# Patient Record
Sex: MALE | Race: WHITE | NOT HISPANIC OR LATINO | Employment: OTHER | URBAN - METROPOLITAN AREA
[De-identification: names, ages, dates, MRNs, and addresses within clinical notes are randomized per-mention and may not be internally consistent; named-entity substitution may affect disease eponyms.]

---

## 2017-01-11 ENCOUNTER — LAB VISIT (OUTPATIENT)
Dept: LAB | Facility: HOSPITAL | Age: 77
End: 2017-01-11
Payer: MEDICARE

## 2017-01-11 DIAGNOSIS — R73.9 HYPERGLYCEMIA: ICD-10-CM

## 2017-01-11 DIAGNOSIS — I10 ESSENTIAL HYPERTENSION: ICD-10-CM

## 2017-01-11 DIAGNOSIS — Z12.5 SCREENING FOR MALIGNANT NEOPLASM OF PROSTATE: ICD-10-CM

## 2017-01-11 LAB
ALBUMIN SERPL BCP-MCNC: 4 G/DL
ALP SERPL-CCNC: 71 U/L
ALT SERPL W/O P-5'-P-CCNC: 35 U/L
ANION GAP SERPL CALC-SCNC: 9 MMOL/L
AST SERPL-CCNC: 59 U/L
BASOPHILS # BLD AUTO: 0.06 K/UL
BASOPHILS NFR BLD: 0.8 %
BILIRUB SERPL-MCNC: 0.9 MG/DL
BUN SERPL-MCNC: 15 MG/DL
CALCIUM SERPL-MCNC: 9.7 MG/DL
CHLORIDE SERPL-SCNC: 102 MMOL/L
CHOLEST/HDLC SERPL: 2.8 {RATIO}
CO2 SERPL-SCNC: 28 MMOL/L
COMPLEXED PSA SERPL-MCNC: 1.4 NG/ML
CREAT SERPL-MCNC: 1.3 MG/DL
DIFFERENTIAL METHOD: ABNORMAL
EOSINOPHIL # BLD AUTO: 0.4 K/UL
EOSINOPHIL NFR BLD: 6 %
ERYTHROCYTE [DISTWIDTH] IN BLOOD BY AUTOMATED COUNT: 12.7 %
EST. GFR  (AFRICAN AMERICAN): >60 ML/MIN/1.73 M^2
EST. GFR  (NON AFRICAN AMERICAN): 52.3 ML/MIN/1.73 M^2
GLUCOSE SERPL-MCNC: 126 MG/DL
HCT VFR BLD AUTO: 40.8 %
HDL/CHOLESTEROL RATIO: 35.5 %
HDLC SERPL-MCNC: 166 MG/DL
HDLC SERPL-MCNC: 59 MG/DL
HGB BLD-MCNC: 14 G/DL
LDLC SERPL CALC-MCNC: 84.2 MG/DL
LYMPHOCYTES # BLD AUTO: 1.7 K/UL
LYMPHOCYTES NFR BLD: 24 %
MCH RBC QN AUTO: 36.6 PG
MCHC RBC AUTO-ENTMCNC: 34.3 %
MCV RBC AUTO: 107 FL
MONOCYTES # BLD AUTO: 0.8 K/UL
MONOCYTES NFR BLD: 11.4 %
NEUTROPHILS # BLD AUTO: 4.2 K/UL
NEUTROPHILS NFR BLD: 57.7 %
NONHDLC SERPL-MCNC: 107 MG/DL
PLATELET # BLD AUTO: 284 K/UL
PMV BLD AUTO: 10 FL
POTASSIUM SERPL-SCNC: 5.2 MMOL/L
PROT SERPL-MCNC: 7.3 G/DL
RBC # BLD AUTO: 3.83 M/UL
SODIUM SERPL-SCNC: 139 MMOL/L
TRIGL SERPL-MCNC: 114 MG/DL
WBC # BLD AUTO: 7.22 K/UL

## 2017-01-11 PROCEDURE — 36415 COLL VENOUS BLD VENIPUNCTURE: CPT

## 2017-01-11 PROCEDURE — 84153 ASSAY OF PSA TOTAL: CPT

## 2017-01-11 PROCEDURE — 83036 HEMOGLOBIN GLYCOSYLATED A1C: CPT

## 2017-01-11 PROCEDURE — 85025 COMPLETE CBC W/AUTO DIFF WBC: CPT

## 2017-01-11 PROCEDURE — 80053 COMPREHEN METABOLIC PANEL: CPT

## 2017-01-11 PROCEDURE — 80061 LIPID PANEL: CPT

## 2017-01-12 LAB
ESTIMATED AVG GLUCOSE: 114 MG/DL
HBA1C MFR BLD HPLC: 5.6 %

## 2017-01-16 ENCOUNTER — OFFICE VISIT (OUTPATIENT)
Dept: INTERNAL MEDICINE | Facility: CLINIC | Age: 77
End: 2017-01-16
Payer: MEDICARE

## 2017-01-16 VITALS
DIASTOLIC BLOOD PRESSURE: 72 MMHG | HEART RATE: 76 BPM | HEIGHT: 72 IN | SYSTOLIC BLOOD PRESSURE: 112 MMHG | BODY MASS INDEX: 28.31 KG/M2 | WEIGHT: 209 LBS

## 2017-01-16 DIAGNOSIS — M25.562 CHRONIC PAIN OF BOTH KNEES: ICD-10-CM

## 2017-01-16 DIAGNOSIS — I10 ESSENTIAL HYPERTENSION: Primary | ICD-10-CM

## 2017-01-16 DIAGNOSIS — M25.561 CHRONIC PAIN OF BOTH KNEES: ICD-10-CM

## 2017-01-16 DIAGNOSIS — G62.1 NEUROPATHY, ALCOHOLIC: ICD-10-CM

## 2017-01-16 DIAGNOSIS — G89.29 CHRONIC PAIN OF BOTH KNEES: ICD-10-CM

## 2017-01-16 PROCEDURE — 1160F RVW MEDS BY RX/DR IN RCRD: CPT | Mod: S$GLB,,, | Performed by: INTERNAL MEDICINE

## 2017-01-16 PROCEDURE — 3078F DIAST BP <80 MM HG: CPT | Mod: S$GLB,,, | Performed by: INTERNAL MEDICINE

## 2017-01-16 PROCEDURE — 1125F AMNT PAIN NOTED PAIN PRSNT: CPT | Mod: S$GLB,,, | Performed by: INTERNAL MEDICINE

## 2017-01-16 PROCEDURE — 99214 OFFICE O/P EST MOD 30 MIN: CPT | Mod: S$GLB,,, | Performed by: INTERNAL MEDICINE

## 2017-01-16 PROCEDURE — 1157F ADVNC CARE PLAN IN RCRD: CPT | Mod: S$GLB,,, | Performed by: INTERNAL MEDICINE

## 2017-01-16 PROCEDURE — 3074F SYST BP LT 130 MM HG: CPT | Mod: S$GLB,,, | Performed by: INTERNAL MEDICINE

## 2017-01-16 PROCEDURE — 99499 UNLISTED E&M SERVICE: CPT | Mod: S$GLB,,, | Performed by: INTERNAL MEDICINE

## 2017-01-16 PROCEDURE — 99999 PR PBB SHADOW E&M-EST. PATIENT-LVL III: CPT | Mod: PBBFAC,,, | Performed by: INTERNAL MEDICINE

## 2017-01-16 PROCEDURE — 1159F MED LIST DOCD IN RCRD: CPT | Mod: S$GLB,,, | Performed by: INTERNAL MEDICINE

## 2017-01-19 ENCOUNTER — HOSPITAL ENCOUNTER (OUTPATIENT)
Dept: RADIOLOGY | Facility: HOSPITAL | Age: 77
Discharge: HOME | End: 2017-01-19
Attending: ORTHOPAEDIC SURGERY
Payer: MEDICARE

## 2017-01-19 ENCOUNTER — OFFICE VISIT (OUTPATIENT)
Dept: ORTHOPEDICS | Facility: CLINIC | Age: 77
End: 2017-01-19
Payer: MEDICARE

## 2017-01-19 VITALS
SYSTOLIC BLOOD PRESSURE: 133 MMHG | BODY MASS INDEX: 28.49 KG/M2 | DIASTOLIC BLOOD PRESSURE: 74 MMHG | WEIGHT: 207.13 LBS | HEART RATE: 73 BPM

## 2017-01-19 DIAGNOSIS — M25.562 ACUTE PAIN OF BOTH KNEES: ICD-10-CM

## 2017-01-19 DIAGNOSIS — M25.561 ACUTE PAIN OF BOTH KNEES: ICD-10-CM

## 2017-01-19 DIAGNOSIS — M17.0 PRIMARY OSTEOARTHRITIS OF BOTH KNEES: Primary | ICD-10-CM

## 2017-01-19 PROCEDURE — 1157F ADVNC CARE PLAN IN RCRD: CPT | Mod: S$GLB,,, | Performed by: PHYSICIAN ASSISTANT

## 2017-01-19 PROCEDURE — 3075F SYST BP GE 130 - 139MM HG: CPT | Mod: S$GLB,,, | Performed by: PHYSICIAN ASSISTANT

## 2017-01-19 PROCEDURE — 1125F AMNT PAIN NOTED PAIN PRSNT: CPT | Mod: S$GLB,,, | Performed by: PHYSICIAN ASSISTANT

## 2017-01-19 PROCEDURE — 3078F DIAST BP <80 MM HG: CPT | Mod: S$GLB,,, | Performed by: PHYSICIAN ASSISTANT

## 2017-01-19 PROCEDURE — 99999 PR PBB SHADOW E&M-EST. PATIENT-LVL III: CPT | Mod: PBBFAC,,, | Performed by: PHYSICIAN ASSISTANT

## 2017-01-19 PROCEDURE — 1159F MED LIST DOCD IN RCRD: CPT | Mod: S$GLB,,, | Performed by: PHYSICIAN ASSISTANT

## 2017-01-19 PROCEDURE — 1160F RVW MEDS BY RX/DR IN RCRD: CPT | Mod: S$GLB,,, | Performed by: PHYSICIAN ASSISTANT

## 2017-01-19 PROCEDURE — 99203 OFFICE O/P NEW LOW 30 MIN: CPT | Mod: S$GLB,,, | Performed by: PHYSICIAN ASSISTANT

## 2017-01-19 PROCEDURE — 73562 X-RAY EXAM OF KNEE 3: CPT | Mod: 26,50,, | Performed by: RADIOLOGY

## 2017-01-19 PROCEDURE — 73562 X-RAY EXAM OF KNEE 3: CPT | Mod: TC,50

## 2017-01-19 NOTE — LETTER
January 19, 2017      Floyd Moon MD  [address]  [facility] [address]           [facility]  [address]  [facility] [address]  Phone: [phone]          Patient: Ej Miller   MR Number: [id number]   YOB: 1940   Date of Visit: 1/19/2017       Dear Dr. Floyd Moon:    Thank you for referring Ej Miller to me for evaluation. Attached you will find relevant portions of my assessment and plan of care.    If you have questions, please do not hesitate to call me. I look forward to following Ej Miller along with you.    Sincerely,    Isidra Callahan PA-C    Enclosure  CC:  No Recipients    If you would like to receive this communication electronically, please contact [email or web address] or [phone] to request more information on [employer] Link access.    For providers and/or their staff who would like to refer a patient to [facility], please contact us through our one-stop-shop provider referral line, [facility] , at [phone].    If you feel you have received this communication in error or would no longer like to receive these types of communications, please e-mail [email or web address]

## 2017-01-19 NOTE — MR AVS SNAPSHOT
[facility]  [address]  [facility] [address]  Phone: [phone]                  Ej Miller   2017 10:30 AM   Appointment    Description:  Male : 1940   Provider:  Isidra Callahan PA-C   Department:  [facility]                To Do List           Future Appointments        Provider Department Dept Phone    2017 10:30 AM Isidra Callahan PA-C [facility] [phone]      Goals (5 Years of Data)     None      [facility]     [facility] On [facility] Nurse Care Line -  Assistance  Registered nurses in the [facility] Call Center provide clinical advisement, health education, appointment booking, and other advisory services.  Call for this free service at [phone].             Medications           Message regarding Medications     Verify the changes and/or additions to your medication regime listed below are the same as discussed with your clinician today.  If any of these changes or additions are incorrect, please notify your healthcare provider.             Verify that the below list of medications is an accurate representation of the medications you are currently taking.  If none reported, the list may be blank. If incorrect, please contact your healthcare provider. Carry this list with you in case of emergency.           Current Medications     allopurinol (ZYLOPRIM) 100 MG tablet TAKE 2 TABLETS ONE TIME DAILY    amlodipine (NORVASC) 5 MG tablet TAKE 1 TABLET ONE TIME DAILY    colchicine 0.6 mg tablet Take 1 tablet (0.6 mg total) by mouth 2 (two) times daily.    latanoprost (XALATAN) 0.005 % ophthalmic solution Place 1 drop into both eyes once daily.    losartan (COZAAR) 100 MG tablet TAKE 1 TABLET ONE TIME DAILY    metoprolol succinate (TOPROL-XL) 100 MG 24 hr tablet TAKE 1 TABLET ONE TIME DAILY  (NEED  APPOINTMENT)    triamcinolone acetonide 0.1% (KENALOG) 0.1 % cream Apply topically 2 (two) times daily.           Clinical  Reference Information           Allergies as of 1/19/2017     Ketoconazole      Immunizations Administered on Date of Encounter - 1/19/2017     None

## 2017-01-19 NOTE — PROGRESS NOTES
Subjective:      Patient ID: Ej Miller is a 76 y.o. male.    Chief Complaint: No chief complaint on file.    HPI  76 year old male presents with chief complaint of bilateral knee pain R>L x 6 years. He denies trauma. Pain is worse with increased activity. He plays golf and he was unable to finish the game due to knee pain. He takes aleve prn which helps but he has h/o ulcer. He received viscosupplementation several years ago that only helped for a couple of months. He does not use assistive devices. He says the pain is getting worse.   Review of Systems   Constitution: Negative for chills, fever and night sweats.   Cardiovascular: Negative for chest pain.   Respiratory: Negative for cough and shortness of breath.    Hematologic/Lymphatic: Does not bruise/bleed easily.   Skin: Negative for color change.   Gastrointestinal: Negative for heartburn.   Genitourinary: Negative for dysuria.   Neurological: Negative for numbness and paresthesias.   Psychiatric/Behavioral: Negative for altered mental status.   Allergic/Immunologic: Negative for persistent infections.         Objective:            General    Vitals reviewed.  Constitutional: He is oriented to person, place, and time. He appears well-developed and well-nourished.   Cardiovascular: Normal rate.    Neurological: He is alert and oriented to person, place, and time.             Right Knee Exam:  ROM 5-120 degrees  +crepitus  No effusion  TTP medial and lateral joint line    Left Knee Exam:  ROM 0-120 degrees  +crepitus  No effusion  TTP medial and lateral joint line    X-ray: ordered and reviewed by myself. DJD present.       Assessment:       Encounter Diagnosis   Name Primary?    Primary osteoarthritis of both knees Yes          Plan:       Discussed treatment options with patient. He is interested in surgery. Consult scheduled with Dr. Ayala on 2/6/17.

## 2017-02-06 ENCOUNTER — OFFICE VISIT (OUTPATIENT)
Dept: ORTHOPEDICS | Facility: CLINIC | Age: 77
End: 2017-02-06
Payer: MEDICARE

## 2017-02-06 ENCOUNTER — HOSPITAL ENCOUNTER (OUTPATIENT)
Dept: RADIOLOGY | Facility: HOSPITAL | Age: 77
Discharge: HOME | End: 2017-02-06
Attending: ORTHOPAEDIC SURGERY
Payer: MEDICARE

## 2017-02-06 ENCOUNTER — TELEPHONE (OUTPATIENT)
Dept: INTERNAL MEDICINE | Facility: CLINIC | Age: 77
End: 2017-02-06

## 2017-02-06 ENCOUNTER — ANESTHESIA EVENT (OUTPATIENT)
Dept: SURGERY | Facility: HOSPITAL | Age: 77
DRG: 470 | End: 2017-02-06
Payer: MEDICARE

## 2017-02-06 VITALS
WEIGHT: 211 LBS | RESPIRATION RATE: 18 BRPM | BODY MASS INDEX: 28.58 KG/M2 | SYSTOLIC BLOOD PRESSURE: 156 MMHG | HEART RATE: 56 BPM | DIASTOLIC BLOOD PRESSURE: 79 MMHG | HEIGHT: 72 IN

## 2017-02-06 DIAGNOSIS — M25.562 PAIN IN BOTH KNEES, UNSPECIFIED CHRONICITY: Primary | ICD-10-CM

## 2017-02-06 DIAGNOSIS — M25.561 PAIN IN BOTH KNEES, UNSPECIFIED CHRONICITY: ICD-10-CM

## 2017-02-06 DIAGNOSIS — M25.561 PAIN IN BOTH KNEES, UNSPECIFIED CHRONICITY: Primary | ICD-10-CM

## 2017-02-06 DIAGNOSIS — M79.604 PAIN OF RIGHT LOWER EXTREMITY: Primary | ICD-10-CM

## 2017-02-06 DIAGNOSIS — M25.562 PAIN IN BOTH KNEES, UNSPECIFIED CHRONICITY: ICD-10-CM

## 2017-02-06 PROCEDURE — 3078F DIAST BP <80 MM HG: CPT | Mod: S$GLB,,, | Performed by: ORTHOPAEDIC SURGERY

## 2017-02-06 PROCEDURE — 99999 PR PBB SHADOW E&M-EST. PATIENT-LVL V: CPT | Mod: PBBFAC,,, | Performed by: ORTHOPAEDIC SURGERY

## 2017-02-06 PROCEDURE — 73560 X-RAY EXAM OF KNEE 1 OR 2: CPT | Mod: 50,TC

## 2017-02-06 PROCEDURE — 1157F ADVNC CARE PLAN IN RCRD: CPT | Mod: S$GLB,,, | Performed by: ORTHOPAEDIC SURGERY

## 2017-02-06 PROCEDURE — 3077F SYST BP >= 140 MM HG: CPT | Mod: S$GLB,,, | Performed by: ORTHOPAEDIC SURGERY

## 2017-02-06 PROCEDURE — 1159F MED LIST DOCD IN RCRD: CPT | Mod: S$GLB,,, | Performed by: ORTHOPAEDIC SURGERY

## 2017-02-06 PROCEDURE — 73560 X-RAY EXAM OF KNEE 1 OR 2: CPT | Mod: 26,50,, | Performed by: RADIOLOGY

## 2017-02-06 PROCEDURE — 1160F RVW MEDS BY RX/DR IN RCRD: CPT | Mod: S$GLB,,, | Performed by: ORTHOPAEDIC SURGERY

## 2017-02-06 PROCEDURE — 99213 OFFICE O/P EST LOW 20 MIN: CPT | Mod: S$GLB,,, | Performed by: ORTHOPAEDIC SURGERY

## 2017-02-06 PROCEDURE — 1125F AMNT PAIN NOTED PAIN PRSNT: CPT | Mod: S$GLB,,, | Performed by: ORTHOPAEDIC SURGERY

## 2017-02-06 NOTE — LETTER
February 6, 2017      Isidra Callahan PA-C  [address] [facility] [address]           [facility]  [address]  [place] [address]  Phone: [phone]          Patient: Ej Miller   MR Number: [id number]   YOB: 1940   Date of Visit: 2/6/2017       Dear Isidra Callahan:    Thank you for referring Ej Miller to me for evaluation. Attached you will find relevant portions of my assessment and plan of care.    If you have questions, please do not hesitate to call me. I look forward to following Ej Miller along with you.    Sincerely,    Wagner Ayala MD    Enclosure  CC:  No Recipients    If you would like to receive this communication electronically, please contact [email or web address] or [phone] to request more information on [employer] Link access.    For providers and/or their staff who would like to refer a patient to [facility], please contact us through our one-stop-shop provider referral line, [facility], at [phone].    If you feel you have received this communication in error or would no longer like to receive these types of communications, please e-mail [email or web address]

## 2017-02-06 NOTE — PROGRESS NOTES
CC:   right knee pain  HPI:  76 y.o. yo male who presents with right knee pain.  This is a very pleasant 76-year-old male avid golfer who presents with right knee pain.  He states the right knee is been hurting for many months and even years.  He states that the pain is sharp and stabbing mostly on the medial aspect of the knee.  He also notes pain at the lateral aspect of the patella.  He states that the pain is there most days.  He keeps him from his daily activities and if he doesn't take Aleve then the pain keeps him from playing golf.  It affects him going up and downstairs.  Wakes him from sleep at night.  It is 8 out of 10 at its worst.  He is tried Visco supplementation anti-inflammatories and physical therapy.  Given that he is failed all of this he is being sent to me for total knee replacement.      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     Gout attack     Gout, joint     HTN (hypertension)     Hyperglycemia     Osteoarthritis of knee      bilaterial    Peptic ulcer      PAST SURGICAL HISTORY:   Past Surgical History   Procedure Laterality Date    Appendectomy      Tonsillectomy, adenoidectomy       FAMILY HISTORY:   Family History   Problem Relation Age of Onset    Stroke Father     No Known Problems Mother     No Known Problems Sister     No Known Problems Brother     No Known Problems Maternal Aunt     No Known Problems Maternal Uncle     No Known Problems Paternal Aunt     No Known Problems Paternal Uncle     No Known Problems Maternal Grandmother     No Known Problems Maternal Grandfather     No Known Problems Paternal Grandmother     No Known Problems Paternal Grandfather     Acne Neg Hx     Eczema Neg Hx     Lupus Neg Hx     Psoriasis Neg Hx     Melanoma Neg Hx     Amblyopia Neg Hx     Blindness Neg Hx     Cancer Neg Hx     Cataracts Neg Hx     Diabetes Neg  "Hx     Glaucoma Neg Hx     Hypertension Neg Hx     Macular degeneration Neg Hx     Retinal detachment Neg Hx     Strabismus Neg Hx     Thyroid disease Neg Hx      SOCIAL HISTORY:   Social History     Social History    Marital status:      Spouse name: N/A    Number of children: N/A    Years of education: N/A     Occupational History    retired      Social History Main Topics    Smoking status: Never Smoker    Smokeless tobacco: Never Used    Alcohol use Yes      Comment: moderate    Drug use: No    Sexual activity: Not on file     Other Topics Concern    Not on file     Social History Narrative       MEDICATIONS:   Current Outpatient Prescriptions:     allopurinol (ZYLOPRIM) 100 MG tablet, TAKE 2 TABLETS ONE TIME DAILY, Disp: 180 tablet, Rfl: 0    amlodipine (NORVASC) 5 MG tablet, TAKE 1 TABLET ONE TIME DAILY, Disp: 90 tablet, Rfl: 3    latanoprost (XALATAN) 0.005 % ophthalmic solution, Place 1 drop into both eyes once daily., Disp: 2.5 mL, Rfl: 12    losartan (COZAAR) 100 MG tablet, TAKE 1 TABLET ONE TIME DAILY, Disp: 90 tablet, Rfl: 3    metoprolol succinate (TOPROL-XL) 100 MG 24 hr tablet, TAKE 1 TABLET ONE TIME DAILY  (NEED  APPOINTMENT), Disp: 90 tablet, Rfl: 3    colchicine 0.6 mg tablet, Take 1 tablet (0.6 mg total) by mouth 2 (two) times daily., Disp: 60 tablet, Rfl: 6    triamcinolone acetonide 0.1% (KENALOG) 0.1 % cream, Apply topically 2 (two) times daily., Disp: , Rfl:   ALLERGIES:   Review of patient's allergies indicates:   Allergen Reactions    Ketoconazole Rash     Topical cream years ago used       VITAL SIGNS:   Visit Vitals    BP (!) 156/79 (BP Location: Right arm, Patient Position: Sitting, BP Method: Automatic)    Pulse (!) 56    Resp 18    Ht 5' 11.5" (1.816 m)    Wt 95.7 kg (210 lb 15.7 oz)    BMI 29.02 kg/m2        Review of Systems   Constitution: Negative. Negative for chills, fever and night sweats.   HENT: Negative for congestion and headaches.  "   Eyes: Negative for blurred vision, left vision loss and right vision loss.   Cardiovascular: Negative for chest pain and syncope.   Respiratory: Negative for cough and shortness of breath.    Endocrine: Negative for polydipsia, polyphagia and polyuria.   Hematologic/Lymphatic: Negative for bleeding problem. Does not bruise/bleed easily.   Skin: Negative for dry skin, itching and rash.   Musculoskeletal: Negative for falls and muscle weakness.  right knee pain  Gastrointestinal: Negative for abdominal pain and bowel incontinence.   Genitourinary: Negative for bladder incontinence and nocturia.   Neurological: Negative for disturbances in coordination, loss of balance and seizures.   Psychiatric/Behavioral: Negative for depression. The patient does not have insomnia.    Allergic/Immunologic: Negative for hives and persistent infections.       Physical Exam     General appearance    This is a well-developed, Well nourished patient No obvious acute distress.  Orientation: Patient is alert and oriented x4    Mood and affect: Normal, pleasant and conversant  Gait is coordinated. Patient can toe walk and heel walk without difficulty.    Cardiovascular: swelling or varicosities present.     Lymphatic: Negative for adenopathy     Deep Tendon Reflexes are normal; negative babinki  Coordination and Balance: Normal   Musculoskeletal   Neck    ROM shows normal flexion and extension and lateral rotation    Palpation: Non-tender    Stability is normal    Strength is normal    Skin is normal without masses and lesions    Sensation intact to light touch   Back    ROM shows normal flexion, extension and rotation    Palpation shows no masses    Stability is normal    Strength to flexion and extension well maintained     Core strength is diminished    Skin shows no rashes or cafe au lait spots    Sensation intact to light touch     Left Lower Extremity    Alignment: Varus     ROM to 100    Palpation shows no masses;     Tenderness:  Mild    Hip ROM: Normal without pain    Knee stability: Stable to varus and valgus force    Skin shows no rashes, lesions or cafe au lait spots    Sensation intact to light touch     Right Lower Extremity     Alignment: Varus    ROM 0-100    Palpation shows no masses    Palpation shows no masses;     Tenderness: Medial joint line and lateral facet of the patella.    Hip ROM: All without pain    Knee stability: Stable to varus and valgus force    Skin shows no rashes, lesions or cafe au lait spots    Sensation intact to light touch        Xrays:  X-rays of bilateral knees are ordered and reviewed today and my interpretation is as follows: Grade 4 osteoarthritis of the medial and patellofemoral compartments of the right knee.  Grade 3 osteoarthritis of the lateral compartment of the right knee.  On the left side he has grade 3 to grade 4 osteoarthritis of tricompartments.    Assessment:  Encounter Diagnoses   Name Primary?    Pain in both knees, unspecified chronicity Yes       Plan:    Orders Placed This Encounter    X-Ray Knee 1 or 2 View Bilateral    Ambulatory Referral to Physical/Occupational Therapy    Case Request Operating Room: REPLACEMENT-KNEE-TOTAL jose armando       Return for surgery.      The conservative options including NSAIDs, activity modification, physical therapy, corticosteroid injection, and viscosupplimentation were discussed. He is interested in surgical intervention at this time.    The surgical process of knee replacement was discussed in detail with the patient including a detailed discussion of the procedure itself (including visual model, x-ray review, and literature review). The typical perioperative and post-operative course was discussed and perioperative risks were discussed to the patient's satisfaction.  Risks and complications discussed included but were not limited to the risks of anesthetic complications, infection, bleeding, wound healing complications, aseptic loosening,  instability, limb length inequality, neurologic dysfunction including numbness,  DVT, pulmonary embolism, perioperative medical risks (cardiac, pulmonary, renal, neurologic), and death and the patient elects to proceed. We will initiate pre-operative medical evaluation and clearance and set a provisional date for surgical intervention according to the patient's schedule.   I have discussed anticoagulation with aspirin and coumadin and in low risk patients I have recommended aspirin twice a day.  25 minutes was spent in direct consultation with the patient counselling him on the items listed above.    The patient will obtain medical clearance. They will participate in pre-habilitation physical therapy.  They will participate in the joint class.    Surgery will be on feb 17th 2017.

## 2017-02-06 NOTE — MR AVS SNAPSHOT
[facility]  [address]  [place] [address]  Phone: [phone]                  Ej Miller   2017 10:30 AM   Office Visit    Description:  Male : 1940   Provider:  Wagner Ayala MD   Department:  [facility]           Reason for Visit     Left Knee - Pain     Right Knee - Pain           Diagnoses this Visit        Comments    Pain in both knees, unspecified chronicity    -  Primary            To Do List           Future Appointments        Provider Department Dept Phone    2/10/2017 11:00 AM Kina Robles PA-C [facility] [phone]    2017 1:30 PM [facility], [facility] Class [phone]    3/3/2017 11:15 AM Kina Robles PA-C [facility] [phone]      Your Future Surgeries/Procedures     2017   Surgery with Wagner Ayala MD   [facility]    [address] [facility] [address] [phone]              Goals (5 Years of Data)     None      [facility]     [facility] On [facility] Nurse Care Line -  Assistance  Registered nurses in the [facility] On Call Center provide clinical advisement, health education, appointment booking, and other advisory services.  Call for this free service at [phone].             Medications           Message regarding Medications     Verify the changes and/or additions to your medication regime listed below are the same as discussed with your clinician today.  If any of these changes or additions are incorrect, please notify your healthcare provider.             Verify that the below list of medications is an accurate representation of the medications you are currently taking.  If none reported, the list may be blank. If incorrect, please contact your healthcare provider. Carry this list with you in case of emergency.           Current Medications     allopurinol  "(ZYLOPRIM) 100 MG tablet TAKE 2 TABLETS ONE TIME DAILY    amlodipine (NORVASC) 5 MG tablet TAKE 1 TABLET ONE TIME DAILY    latanoprost (XALATAN) 0.005 % ophthalmic solution Place 1 drop into both eyes once daily.    losartan (COZAAR) 100 MG tablet TAKE 1 TABLET ONE TIME DAILY    metoprolol succinate (TOPROL-XL) 100 MG 24 hr tablet TAKE 1 TABLET ONE TIME DAILY  (NEED  APPOINTMENT)    colchicine 0.6 mg tablet Take 1 tablet (0.6 mg total) by mouth 2 (two) times daily.    triamcinolone acetonide 0.1% (KENALOG) 0.1 % cream Apply topically 2 (two) times daily.           Clinical Reference Information           Your Vitals Were     BP Pulse Resp Height Weight BMI    156/79 (BP Location: Right arm, Patient Position: Sitting, BP Method: Automatic) 56 18 5' 11.5" (1.816 m) 95.7 kg (210 lb 15.7 oz) 29.02 kg/m2      Blood Pressure          Most Recent Value    BP  (!)  156/79      Allergies as of 2/6/2017     Ketoconazole      Immunizations Administered on Date of Encounter - 2/6/2017     None      Orders Placed During Today's Visit      Normal Orders This Visit    Ambulatory Referral to Physical/Occupational Therapy     Case Request Operating Room: REPLACEMENT-KNEE-TOTAL jose armando     Future Labs/Procedures Expected by Expires    X-Ray Knee 1 or 2 View Bilateral  2/6/2017 2/6/2018      Language Assistance Services     ATTENTION: Language assistance services are available, free of charge. Please call [phone].      ATENCIÓN: Si nickyla yesika, tiene a naik disposición servicios gratuitos de asistencia lingüística. Llame al [phone].     [facility] Ý: N?u b?n nói Ti?ng Vi?t, có các d?ch v? h? tr? ngôn ng? mi?n phí dành cho b?n. G?i s? [phone].         [facility] complies with applicable Federal civil rights laws and does not discriminate on the basis of race, color, national origin, age, disability, or sex.        "

## 2017-02-06 NOTE — ANESTHESIA PREPROCEDURE EVALUATION
Pre-operative evaluation for REPLACEMENT-KNEE-TOTAL jose armando (Right)    ID:   Ej Miller is a 76 y.o. male with HTN, Gout, Open Angle Glaucoma, OA    Case Discussion: OA of R Knee. Functional status limited by knee pain. Denies CP SOB with activity. No previous issues with anesthesia.     Previous Airway:  None on file      Past Surgical History   Procedure Laterality Date    Appendectomy      Tonsillectomy, adenoidectomy         Vital Signs Range (Last 24H):         CBC:   No results for input(s): WBC, RBC, HGB, HCT, PLT, MCV, MCH, MCHC in the last 720 hours.    CMP: No results for input(s): NA, K, CL, CO2, BUN, CREATININE, GLU, MG, PHOS, CALCIUM, ALBUMIN, PROT, ALKPHOS, ALT, AST, BILITOT in the last 720 hours.    INR:    Recent Labs  Lab 02/08/17  1217   INR 1.0       Diagnostic Studies:      EKG 2/8/17:  - NSR    2D Echo:  - None on file      Anesthesia Assessment: Preoperative EQUATION    Planned Procedure: Procedure(s) (LRB):  REPLACEMENT-KNEE-TOTAL jose armando (Right)  Requested Anesthesia Type:Regional  Surgeon: Wagner Ayala MD  Service: Orthopedics  Known or anticipated Date of Surgery:2/17/2017    Surgeon notes: reviewed    Electronic QUestionnaire Assessment completed via nurse interview with patient.      Ej Miller [[id number]] - 76 y.o. Male        Providers Outside of [facility]      No data to display       Surgical Risk Level      Surgical Risk Level:  3           caRDScore (Clinical Anesthesia Rapid Decision Score)        Low  Total Score: 12      12 Sum of Clinical Scores       caRDScores (Grouped)      caRDScore - Ane:  4                caRDScore - CVD:  2                caRDScore - Pul:  0                caRDScore - Met:  6                caRDScore - Phy:  0           caRDScore Items           Pre-admit from 2/17/2017 in [facility]     Anesthesia      Has very loose tooth or teeth  Yes [upper broken tooth]     Has severe degenerative arthritis  (osteoarthritis)  Yes     Has GERD, hiatal hernia, or chronic heartburn/dyspepsia requiring Rx some or all times  Yes     CVD      Activity similar to best ability for maximal activity or exercise  METS 3     Diagnosed with high blood pressure  Yes     Typical BP runs <150/90  Yes     Pulmonary      Metabolic      Currently drinks on most days  Yes     At least 6 beers or 3 gl wine or 3 oz. 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  Yes     NSAID  Yes [aleve]     Has very loose tooth or teeth  Yes [upper broken tooth]     Has pain  Yes       PONV Risk Score (assumes periop narcotic use = +1, Max=4)      PONV Risk Score:  2           PONV Risk Factors  Total Score: 1      1 Non-Smoker at present       Sleep Apnea  Total Score: 0        TYLOR STOP-Bang Risk Factors (Max=8)  Total Score: 3      1 Takes medication for high blood pressure     1 Age >50     1 Male       TYLOR Risk Level - 1 (Low), 2 (Moderate), 3 (High)      TYLOR Risk Level:  2           RCRI (Revised Cardiac Risk Indices of ACC/AHA guidelines, Max=6)  Total Score: 0        CAD Risk Factors  Total Score: 3      1 Male. Age >45     1 Exercise on a routine basis     1 Diagnosed with high blood pressure       CHADS Score if applicable (history of atrial fib/flutter, Max=6)  Total Score: 2      1 Age >75     1 Diagnosed with high blood pressure       Maximal Exercise Capacity           Pre-admit from  2/17/2017 in [facility]     Maximal Exercise Capacity  METS 3       Summary of Dependence  Total Score: 1      1 Is totally independent of others for activities of daily living       Phone Fraility Score (Max = 17)  Total Score: 1      1 Uses 5 or more meds on reg basis       Pain Factors           Pre-admit from 2/17/2017 in [facility]     Has pain  Yes     Location and description of pain  knee painnagging     Duration of pain  Pain is chronic, has been present greater than 12 mo     Typical Pain Scores  0 to 4     Not using strong pain medications  Yes     Current regimen controls pain/makes pain tolerable  Yes       Risk Triggers (Evidence-Based Risk Triggers)        Pulmonary Risk Triggers  Total Score: 2      1 Age > or = 60     1 Currently drinks on most days       Renal Risk Triggers  Total Score: 1      1 Diagnosed with high blood pressure       Delirium Risk Triggers  Total Score: 2      1 Age > or = 75     1 At least 6 beers or 3 gl wine or 3 oz. hard liquor most days       Urologic Risk Triggers  Total Score: 0        Logistics        Pre-op Clinic Logistics  Total Score: 7      2 Takes herbal medications or vitamin supplements     1 Has had anesthesia, either as adult or as a child     2 NSAID     1 Has very loose tooth or teeth     1 Takes medication for high blood pressure       DOSC Logistics  Total Score: 2      1 NSAID     1 Has peripheral neuropathy       Discharge Logistics  Total Score: 8      1 Functional capacity limit: METS 3     2 At least 6 beers or 3 gl wine or 3 oz. 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     1 Has severe degenerative arthritis (osteoarthritis)     1 Has peripheral neuropathy       Discharge Planning           Pre-admit from 2/17/2017 in [facility]     Discharge Planning      Discharge plans  Home alone     Will assist patient 24/7, if needed  wife karin Bradley [phone]     Who will transport you to  therapy, if need  family       Anes & Int Med <For office use only>      Total Score: 13        Surgical Risk Level Assessment       Triage considerations:     The patient has no apparent active cardiac condition (No unstable coronary Syndrome such as severe unstable angina or recent [<1 month] myocardial infarction, decompensated CHF, severe valvular   disease or significant arrhythmia)    Previous anesthesia records:GENERAL    Last PCP note: within 1 month , within [facility]   Subspecialty notes: Rheumatology([facility])    Other important co-morbidities:      Tests already available:  1/11/17 CBC,CMP, A1C  5.6            Instructions given. (See in Nurse's note)    Optimization:  Anesthesia Preop Clinic Assessment  Indicated          Plan:    Testing:  PT/INR and EKG   Pre-anesthesia  visit       Visit focus: possible regional anesthesia and/or nerve block        Patient  has previously scheduled Medical Appointment:1/18 MESSAGE SENT  FOR CLEARANCE    Navigation: Tests Scheduled.                        Results will be tracked by Preop Clinic.           MILI HUMPHREY 2/6/17                                                             [facility] Anesthesia Evaluation         Review of Systems  Anesthesia Hx:  No problems with previous Anesthesia Past surgical hx appendectomy and T&A   Social:  Non-Smoker, Alcohol Use 3-4 scotch and water a day. Patient reports if he does not have his drinks daily, her will have trouble sleeping but no other withdrawal symptoms. Patient never has been with his daily drinks.   Hematology/Oncology:  Hematology Normal   Oncology Normal     EENT/Dental:   glaucoma   Cardiovascular:   Hypertension (patient did not take b/p medications today. clinic b/p 150/70 and 147/68) Golfing 2 times a week-uses a cart. Grocery shopping.  Can climb a flight of stairs. Limited due to knee pain.  Denies chest pain or sob   Pulmonary:  Possible Obstructive Sleep Apnea , (STOP/BANG) Symptoms S - Snoring (loud), A - Age >  50, N - Neck circumference > 40 cms, G - Gender (Male > Female) and P - Pressure being treated for high BP    Renal/:  Renal/ Normal     Hepatic/GI:   PUD, (bleeding ulcers due to aleve use-10+ years ago) Liver Disease, (slightly elevated AST)    Musculoskeletal:  Musculoskeletal General/Symptoms: Functional capacity is ambulatory without assistance.    Neurological:   Denies CVA. Denies Seizures. Chronic tingling in feet. Hx of gout. Problems list indicates alcohol neuropathy Pain , onset is chronic , location of knee , quality of aching/dull , severity is a 6 , precipitating factors are walking , alleviating factors are rest.   Endocrine:  Endocrine Normal    Psych:  Psychiatric Normal           Physical Exam  General:  Obesity    Airway/Jaw/Neck:  Airway Findings: Mouth Opening: Normal Tongue: Normal  General Airway Assessment: Adult  Jaw/Neck Findings:  Neck ROM: Extension Decreased, Mild      Dental:  Dental Findings: (upper left broken tooth) molar caps   Chest/Lungs:  Chest/Lungs Findings: Clear to auscultation, Normal Respiratory Rate     Heart/Vascular:  Heart Findings: Rate: Normal  Rhythm: Regular Rhythm  Sounds: Normal        Mental Status:  Mental Status Findings:  Cooperative, Alert and Oriented       EKG and Labs reviewed-K+ 5.2. Patient reports he eats 2-3 bananas per week.  Discussed with Dr. Leopold.  Patient instructed to hold bananas for now.    Medical clearance with Dr. Red King RN 2/9/17    Anesthesia Plan  Type of Anesthesia, risks & benefits discussed:  Anesthesia Type:  spinal, regional, general, epidural  Patient's Preference:   Intra-op Monitoring Plan:   Intra-op Monitoring Plan Comments:   Post Op Pain Control Plan:   Post Op Pain Control Plan Comments:   Induction:   IV  Beta Blocker:  Patient is on a Beta-Blocker and has received one dose within the past 24 hours (No further documentation required).       Informed Consent: Patient understands risks and agrees with  Anesthesia plan.  Questions answered. Anesthesia consent signed with patient.  ASA Score: 3     Day of Surgery Review of History & Physical:    H&P update referred to the surgeon.         Ready For Surgery From Anesthesia Perspective.

## 2017-02-06 NOTE — PRE ADMISSION SCREENING
Anesthesia Assessment: Preoperative EQUATION    Planned Procedure: Procedure(s) (LRB):  REPLACEMENT-KNEE-TOTAL jose armando (Right)  Requested Anesthesia Type:Regional  Surgeon: Wagner Ayala MD  Service: Orthopedics  Known or anticipated Date of Surgery:2/17/2017    Surgeon notes: reviewed    Electronic QUestionnaire Assessment completed via nurse interview with patient.      jE Miller [[id number]] - 76 y.o. Male        Providers Outside of [facility]      No data to display       Surgical Risk Level      Surgical Risk Level:  3           caRDScore (Clinical Anesthesia Rapid Decision Score)        Low  Total Score: 12      12 Sum of Clinical Scores       caRDScores (Grouped)      caRDScore - Ane:  4                caRDScore - CVD:  2                caRDScore - Pul:  0                caRDScore - Met:  6                caRDScore - Phy:  0           caRDScore Items           Pre-admit from 2/17/2017 in [facility]     Anesthesia      Has very loose tooth or teeth  Yes [upper broken tooth]     Has severe degenerative arthritis (osteoarthritis)  Yes     Has GERD, hiatal hernia, or chronic heartburn/dyspepsia requiring Rx some or all times  Yes     CVD      Activity similar to best ability for maximal activity or exercise  METS 3     Diagnosed with high blood pressure  Yes     Typical BP runs <150/90  Yes     Pulmonary      Metabolic      Currently drinks on most days  Yes     At least 6 beers or 3 gl wine or 3 oz. 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  Yes     NSAID  Yes [aleve]     Has very loose tooth or teeth  Yes [upper broken tooth]     Has pain  Yes       PONV Risk Score (assumes periop narcotic use = +1, Max=4)      PONV Risk Score:  2           PONV Risk Factors  Total Score: 1      1 Non-Smoker at present       Sleep Apnea  Total Score: 0        TYLOR STOP-Bang Risk Factors (Max=8)  Total Score: 3      1 Takes medication for high blood pressure     1 Age >50     1 Male       TYLOR Risk Level - 1 (Low), 2 (Moderate), 3 (High)      TYLOR Risk Level:  2           RCRI (Revised Cardiac Risk Indices of ACC/AHA guidelines, Max=6)  Total Score: 0        CAD Risk Factors  Total Score: 3      1 Male. Age >45     1 Exercise on a routine basis     1 Diagnosed with high blood pressure       CHADS Score if applicable (history of atrial fib/flutter, Max=6)  Total Score: 2      1 Age >75     1 Diagnosed with high blood pressure       Maximal Exercise Capacity           Pre-admit from 2/17/2017 in [facility]     Maximal Exercise Capacity  METS 3       Summary of Dependence  Total Score: 1      1 Is totally independent of others for activities of daily living       Phone Fraility Score (Max = 17)  Total Score: 1      1 Uses 5 or more meds on reg basis       Pain Factors           Pre-admit from 2/17/2017 in [facility]     Has pain  Yes     Location and description of pain  knee painnagging     Duration of pain  Pain is chronic, has been present greater than 12 mo     Typical Pain Scores  0 to 4     Not using strong pain medications  Yes     Current regimen controls pain/makes pain tolerable  Yes       Risk Triggers (Evidence-Based Risk Triggers)        Pulmonary Risk Triggers  Total Score: 2      1 Age > or = 60     1 Currently drinks on most days       Renal Risk Triggers  Total Score: 1      1 Diagnosed with  high blood pressure       Delirium Risk Triggers  Total Score: 2      1 Age > or = 75     1 At least 6 beers or 3 gl wine or 3 oz. hard liquor most days       Urologic Risk Triggers  Total Score: 0        Logistics        Pre-op Clinic Logistics  Total Score: 7      2 Takes herbal medications or vitamin supplements     1 Has had anesthesia, either as adult or as a child     2 NSAID     1 Has very loose tooth or teeth     1 Takes medication for high blood pressure       DOSC Logistics  Total Score: 2      1 NSAID     1 Has peripheral neuropathy       Discharge Logistics  Total Score: 8      1 Functional capacity limit: METS 3     2 At least 6 beers or 3 gl wine or 3 oz. 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     1 Has severe degenerative arthritis (osteoarthritis)     1 Has peripheral neuropathy       Discharge Planning           Pre-admit from 2/17/2017 in [facility]     Discharge Planning      Discharge plans  Home alone     Will assist patient 24/7, if needed  wife karin Bradley [phone]     Who will transport you to therapy, if need  family       Anes & Int Med <For office use only>      Total Score: 13        Surgical Risk Level Assessment       Triage considerations:     The patient has no apparent active cardiac condition (No unstable coronary Syndrome such as severe unstable angina or recent [<1 month] myocardial infarction, decompensated CHF, severe valvular   disease or significant arrhythmia)    Previous anesthesia records:GENERAL    Last PCP note: within 1 month , within [facility]   Subspecialty notes: Rheumatology([facility])    Other important co-morbidities:      Tests already available:  1/11/17 CBC,CMP, A1C  5.6            Instructions given. (See in Nurse's note)    Optimization:  Anesthesia Preop Clinic Assessment  Indicated              Plan:    Testing:  PT/INR and EKG   Pre-anesthesia  visit       Visit focus: possible regional anesthesia and/or nerve block         Patient  has previously scheduled Medical Appointment:1/18 MESSAGE SENT  FOR CLEARANCE    Navigation: Tests Scheduled.                        Results will be tracked by Preop Clinic.

## 2017-02-06 NOTE — TELEPHONE ENCOUNTER
----- Message from Leidy Guerra RN sent at 2/6/2017  1:42 PM CST -----  Patient is having RIGHT TOTAL KNEE REPLACEMENT on 2/17  with DR RAHMAN . Anesthesia has received existing labs and test for  perioperative planning. We will order PT/INR, EKG  according to our anesthesia's protocol. Anesthesia is requesting a medical optimization/clearance prior to surgery. You saw pt on 1/18/17 Please advise.    Thank you for your assistance  Silva HUMPHREY  Pre op anesthesia  [id number]

## 2017-02-07 NOTE — TELEPHONE ENCOUNTER
Patient is medically stable and should be considered clear for anesthesia and surgery from a medical standpoint.

## 2017-02-08 ENCOUNTER — HOSPITAL ENCOUNTER (OUTPATIENT)
Dept: CARDIOLOGY | Facility: CLINIC | Age: 77
Discharge: HOME | End: 2017-02-08
Payer: MEDICARE

## 2017-02-08 ENCOUNTER — HOSPITAL ENCOUNTER (OUTPATIENT)
Dept: PREADMISSION TESTING | Facility: HOSPITAL | Age: 77
Discharge: HOME | End: 2017-02-08
Attending: ANESTHESIOLOGY
Payer: MEDICARE

## 2017-02-08 VITALS
OXYGEN SATURATION: 96 % | DIASTOLIC BLOOD PRESSURE: 68 MMHG | HEART RATE: 65 BPM | TEMPERATURE: 98 F | BODY MASS INDEX: 31.28 KG/M2 | RESPIRATION RATE: 20 BRPM | SYSTOLIC BLOOD PRESSURE: 147 MMHG | HEIGHT: 69 IN | WEIGHT: 211.19 LBS

## 2017-02-08 DIAGNOSIS — M79.604 PAIN OF RIGHT LOWER EXTREMITY: ICD-10-CM

## 2017-02-08 PROCEDURE — 93000 ELECTROCARDIOGRAM COMPLETE: CPT | Mod: S$GLB,,, | Performed by: INTERNAL MEDICINE

## 2017-02-08 RX ORDER — NAPROXEN SODIUM 220 MG
220 TABLET ORAL 2 TIMES DAILY WITH MEALS
COMMUNITY
End: 2017-08-14

## 2017-02-08 RX ORDER — GLUCOSAMINE/CHONDRO SU A 500-400 MG
1 TABLET ORAL 3 TIMES DAILY
COMMUNITY
End: 2017-08-14

## 2017-02-08 NOTE — IP AVS SNAPSHOT
[facility]  [address]  [facility] [address]  Phone: [phone]           Patient Discharge Instructions    Our goal is to set you up for success. This packet includes information on your condition, medications, and your home care. It will help you to care for yourself so you don't get sicker.     Please ask your nurse if you have any questions.        There are many details to remember when preparing for your surgery. Here is what you will need to do, please ask your nurse if there are more specific instructions and if you have any questions:    1. 24 hours before procedure Do not smoke or drink alcoholic beverages 24 hours prior to your procedure    2. Eating before procedure Do not eat or drink anything 8 hours before your procedure - this includes gum, mints, and candy.     3. Day of procedure Please remove all jewelry for the procedure. If you wear contact lenses, dentures, hearing aids or glasses, bring a container to put them in during your surgery and give to a family member for safekeeping.  If your doctor has scheduled you for an overnight stay, bring a small overnight bag with any personal items that you need.    4. After procedure Make arrangements in advance for transportation home by a responsible adult. It is not safe to drive a vehicle during the 24 hours following surgery.     PLEASE NOTE: You may be contacted the day before your surgery to confirm your surgery date and arrival time. The Surgery schedule has many variables which may affect the time of your surgery case. Family members should be available if your surgery time changes.                [facility]  Unless otherwise directed by your provider, please contact [facility], our nurse care line that is available for 24/7 assistance.     [phone] (toll-free)    Registered nurses in the [facility] provide clinical advisement, health education, appointment booking, and other  advisory services.                    ** Verify the list of medication(s) below is accurate and up to date. Carry this with you in case of emergency. If your medications have changed, please notify your healthcare provider.             Medication List      TAKE these medications        Additional Info                      allopurinol 100 MG tablet   Commonly known as:  ZYLOPRIM   Quantity:  180 tablet   Refills:  0    Instructions:  TAKE 2 TABLETS ONE TIME DAILY     Begin Date    AM    Noon    PM    Bedtime       amlodipine 5 MG tablet   Commonly known as:  NORVASC   Quantity:  90 tablet   Refills:  3    Instructions:  TAKE 1 TABLET ONE TIME DAILY     Begin Date    AM    Noon    PM    Bedtime       colchicine 0.6 mg tablet   Quantity:  60 tablet   Refills:  6   Dose:  0.6 mg   Comments:  Please disregard order sent this morning for colcrys once daily. Patient should take this bid.    Instructions:  Take 1 tablet (0.6 mg total) by mouth 2 (two) times daily.     Begin Date    AM    Noon    PM    Bedtime       glucosamine-chondroitin 500-400 mg tablet   Refills:  0   Dose:  1 tablet    Instructions:  Take 1 tablet by mouth 3 (three) times daily.     Begin Date    AM    Noon    PM    Bedtime       latanoprost 0.005 % ophthalmic solution   Commonly known as:  XALATAN   Quantity:  2.5 mL   Refills:  12   Dose:  1 drop    Instructions:  Place 1 drop into both eyes once daily.     Begin Date    AM    Noon    PM    Bedtime       losartan 100 MG tablet   Commonly known as:  COZAAR   Quantity:  90 tablet   Refills:  3    Instructions:  TAKE 1 TABLET ONE TIME DAILY     Begin Date    AM    Noon    PM    Bedtime       MEN 50 PLUS MULTIVITAMIN ORAL   Refills:  0    Instructions:  Take by mouth.     Begin Date    AM    Noon    PM    Bedtime       metoprolol succinate 100 MG 24 hr tablet   Commonly known as:  TOPROL-XL   Quantity:  90 tablet   Refills:  3    Instructions:  TAKE 1 TABLET ONE TIME DAILY  (NEED  APPOINTMENT)     Begin  Date    AM    Noon    PM    Bedtime       naproxen sodium 220 MG tablet   Commonly known as:  ANAPROX   Refills:  0   Dose:  220 mg    Instructions:  Take 220 mg by mouth 2 (two) times daily with meals.     Begin Date    AM    Noon    PM    Bedtime       triamcinolone acetonide 0.1% 0.1 % cream   Commonly known as:  KENALOG   Refills:  0    Instructions:  Apply topically 2 (two) times daily.     Begin Date    AM    Noon    PM    Bedtime                  Please bring to all follow up appointments:    1. A copy of your discharge instructions.  2. All medicines you are currently taking in their original bottles.  3. Identification and insurance card.    Please arrive 15 minutes ahead of scheduled appointment time.    Please call 24 hours in advance if you must reschedule your appointment and/or time.        Your Scheduled Appointments     Feb 08, 2017 11:45 AM CST   EKG with EKG, APPT   [facility] - EKG ([facility] )    [address]  [place] [address]   [phone]            Feb 08, 2017 12:10 PM CST   Non-Fasting Lab with LAB, APPOINTMENT [place] [facility] ([facility])    [address] [facility] [address]   [phone]            Feb 10, 2017 11:00 AM CST   Pre OP with EMELINA Fuentes bertha - Orthopedics ([facility] )    [address]  [place] [address]   [phone]            Feb 15, 2017 10:00 AM CST   New Physical Therapy Patient with Jacob Gutierrez PT   [facility] ([place])    [address]  [facility] [address]   [phone]            Feb 16, 2017  1:30 PM CST   Education Class with JOINT CAMP, [facility] - Orthopedics Class ([facility] )    [address]  [place] [address]   [phone]              Your Future Surgeries/Procedures     Feb 17, 2017   Surgery with Wagner Ayala MD   [facility] ([facility])    [address] [facility] [address] [phone]                  Discharge Instructions       Your surgery has been scheduled for:__________________________________________    You should report to:  ____[facility], located on the [place] side of the first floor of the           [facility] ([phone])  ____[facility], located on the [facility] side of the            Second floor of the [facility] ([phone])  ____3rd Floor SSCU located on the [facility] side of the [facility] [phone]  Please Note   - Tell your doctor if you take Aspirin, products containing Aspirin, herbal medications  or blood thinners, such as Coumadin, Ticlid, or Plavix.  (Consult your provider regarding holding or stopping before surgery).  - Arrange for someone to drive you home following surgery.  You will not be allowed to leave the surgical facility alone or drive yourself home following sedation and anesthesia.  Before Surgery  - Stop taking all herbal medications 14days prior to surgery  - No Motrin/Advil (Ibuprofen) 7 days before surgery  - No Aleve (Naproxen) 7 days before surgery  - Stop Taking Asprin, products containing Asprin _____days before surgery  - Stop taking blood thinners_______days before surgery  - Refrain from drinking alcoholic beverages for 24hours before and after surgery  - Stop or limit smoking _________days before surgery  Night before Surgery  - DO NOT EAT OR DRINK ANYTHING AFTER MIDNIGHT, INCLUDING GUM, HARD CANDY, MINTS, OR CHEWING TOBACCO.  - Take a shower or bath (shower is recommended).  Bathe with Hibiclens soap or an antibacterial soap from the neck down.  If not supplied by your surgeon, hibiclens soap will need to be purchased over the counter in pharmacy.  Rinse soap off thoroughly.  - Shampoo your hair with your regular shampoo  The Day of Surgery  - Take another  bath or shower with hibiclens or any antibacterial soap, to reduce the chance of infection.  - Take heart and blood pressure medications with a small sip of water, as advised by the perioperative team.  - Do not take fluid pills  - You may brush your teeth and rinse your mouth, but do not swall any additional water.   - Do not apply perfumes, powder, body lotions or deodorant on the day of surgery.  - Nail polish should be removed.  - Do not wear makeup or moisturizer  - Wear comfortable clothes, such as a button front shirt and loose fitting pants.  - Leave all jewelry, including body piercings, and valuables at home.    - Bring any devices you will neeed after surgery such as crutches or canes.  - If you have sleep apnea, please bring your CPAP machine  In the event that your physical condition changes including the onset of a cold or respiratory illness, or if you have to delay or cancel your surgery, please notify your surgeon.  Anesthesia: Regional Anesthesia  Youre scheduled for surgery. During surgery, youll receive medication called anesthesia to keep you comfortable and pain-free. Your surgeon has decided that youll receive regional anesthesia. This sheet tells you what to expect with this type of anesthesia.  What Is Regional Anesthesia?  Regional anesthesia numbs one region of your body. The anesthesia may be given around nerves or into veins in your arms, neck, or legs (nerve block or [place] block). Or it may be sent into the spinal fluid (spinal anesthesia) or into the space just outside the spinal fluid (epidural anesthesia). Sedatives may also be given to relax you.  Nerve Block or [place] Block  A small area of the body, such as an arm or leg, can be numbed using a nerve block or [place] block.  · Nerve block: During a nerve block, your skin is numbed. A needle is then inserted near nerves that serve the area to be numbed. Anesthetic is sent through the needle.  · IV regional or Syl block: For this type  of block, an IV line is put into a vein. The blood flow to the area to be numbed is blocked for a short time. Anesthetic is sent through the IV.  Spinal Anesthesia  Spinal anesthesia numbs your body from about the waist down.  · Anesthetic is injected into the spinal fluid. This is a substance that surrounds the spinal cord in your spinal column. The anesthetic blocks pain traveling from the body to the brain.  · To receive the anesthetic, your skin is numbed at the injection site.  · A needle is then inserted into the spinal fluid. Anesthetic is sent through the needle.  Anesthesia Tools and Medications  · Local anesthetic given through a needle numbs one region of your body.  · Electrocardiography leads (electrodes) record your heart rate and rhythm.  · A blood pressure cuff monitors your blood pressure.  · A pulse oximeter on the end of the finger measures your blood oxygen level.  · Sedatives may be given through an IV to relax you and keep you comfortable. You may stay awake or sleep slightly.  · Oxygen may be given to you through a facemask.  Risks and Possible Complications  Regional anesthesia carries some risks. These include:  · Nausea and vomiting  · Headache  · Backache  · Decreased blood pressure  · Allergic reaction to the anesthetic  · Ongoing numbness (rare)  · Irregular heartbeat (rare)  · Cardiac arrest (rare)   © 1459-5590 [address], [place] [address]. All rights reserved. This information is not intended as a substitute for professional medical care. Always follow your healthcare professional's instructions.      Admission Information     Date & Time Provider Department [facility]    2/8/2017 11:00 AM Rhonda G Leopold, MD [facility] [phone]      Care Providers     Provider Role Specialty Primary office phone    Rhonda G Leopold, MD Attending Provider Anesthesiology [phone]      Your Vitals Were     BP Pulse Temp Resp Height Weight    147/68 65 98.3  "°F (36.8 °C) 20 5' 9" (1.753 m) 95.8 kg (211 lb 3.2 oz)    SpO2 BMI             96% 31.19 kg/m2         Recent Lab Values        2/17/2006 3/19/2007 10/20/2009 5/27/2010 3/30/2013 9/21/2015 1/11/2017         8:10 AM  7:40 AM  7:08 AM  7:27 AM  8:10 AM  7:13 AM  8:53 AM     A1C 5.7 5.8 6.0 5.8 6.0 5.7 5.6     Comment for A1C at  8:53 AM on 1/11/2017:  According to ADA guidelines, hemoglobin A1C <7.0% represents  optimal control in non-pregnant diabetic patients.  Different  metrics may apply to specific populations.   Standards of Medical Care in Diabetes - 2016.  For the purpose of screening for the presence of diabetes:  <5.7%     Consistent with the absence of diabetes  5.7-6.4%  Consistent with increasing risk for diabetes   (prediabetes)  >or=6.5%  Consistent with diabetes  Currently no consensus exists for use of hemoglobin A1C  for diagnosis of diabetes for children.        Allergies as of 2/8/2017        Reactions    Ketoconazole Rash    Topical cream years ago used      Advance Directives     An advance directive is a document which, in the event you are no longer able to make decisions for yourself, tells your healthcare team what kind of treatment you do or do not want to receive, or who you would like to make those decisions for you.  If you do not currently have an advance directive, [facility] encourages you to create one.  For more information call:  [phone]), [phone] ([phone]),  or log on to [email or web address].        Language Assistance Services     ATTENTION: Language assistance services are available, free of charge. Please call [phone].      ATENCIÓN: Si habla español, tiene a naik disposición servicios gratuitos de asistencia lingüística. Llame al [phone].     CHÚ Ý: N?u b?n nói Ti?ng Vi?t, có các d?ch v? h? tr? ngôn ng? mi?n phí dành cho b?n. G?i s? [phone].         [facility] complies with applicable Federal civil rights " laws and does not discriminate on the basis of race, color, national origin, age, disability, or sex.

## 2017-02-08 NOTE — DISCHARGE INSTRUCTIONS
Your surgery has been scheduled for:__________________________________________    You should report to:  ____[facility], located on the [place] side of the first floor of the           [facility] ([phone])  ____[facility] Second Floor [facility] Center, located on the [facility] side of the            Second floor of the [facility] ([phone])  ____3rd Floor SSCU located on the [facility] side of the [facility] [phone]  Please Note   - Tell your doctor if you take Aspirin, products containing Aspirin, herbal medications  or blood thinners, such as Coumadin, Ticlid, or Plavix.  (Consult your provider regarding holding or stopping before surgery).  - Arrange for someone to drive you home following surgery.  You will not be allowed to leave the surgical facility alone or drive yourself home following sedation and anesthesia.  Before Surgery  - Stop taking all herbal medications 14days prior to surgery  - No Motrin/Advil (Ibuprofen) 7 days before surgery  - No Aleve (Naproxen) 7 days before surgery  - Stop Taking Asprin, products containing Asprin _____days before surgery  - Stop taking blood thinners_______days before surgery  - Refrain from drinking alcoholic beverages for 24hours before and after surgery  - Stop or limit smoking _________days before surgery  Night before Surgery  - DO NOT EAT OR DRINK ANYTHING AFTER MIDNIGHT, INCLUDING GUM, HARD CANDY, MINTS, OR CHEWING TOBACCO.  - Take a shower or bath (shower is recommended).  Bathe with Hibiclens soap or an antibacterial soap from the neck down.  If not supplied by your surgeon, hibiclens soap will need to be purchased over the counter in pharmacy.  Rinse soap off thoroughly.  - Shampoo your hair with your regular shampoo  The Day of Surgery  - Take another bath or shower with hibiclens or any antibacterial soap, to reduce the chance of infection.  - Take heart and blood  pressure medications with a small sip of water, as advised by the perioperative team.  - Do not take fluid pills  - You may brush your teeth and rinse your mouth, but do not swall any additional water.   - Do not apply perfumes, powder, body lotions or deodorant on the day of surgery.  - Nail polish should be removed.  - Do not wear makeup or moisturizer  - Wear comfortable clothes, such as a button front shirt and loose fitting pants.  - Leave all jewelry, including body piercings, and valuables at home.    - Bring any devices you will neeed after surgery such as crutches or canes.  - If you have sleep apnea, please bring your CPAP machine  In the event that your physical condition changes including the onset of a cold or respiratory illness, or if you have to delay or cancel your surgery, please notify your surgeon.  Anesthesia: Regional Anesthesia  Youre scheduled for surgery. During surgery, youll receive medication called anesthesia to keep you comfortable and pain-free. Your surgeon has decided that youll receive regional anesthesia. This sheet tells you what to expect with this type of anesthesia.  What Is Regional Anesthesia?  Regional anesthesia numbs one region of your body. The anesthesia may be given around nerves or into veins in your arms, neck, or legs (nerve block or [place] block). Or it may be sent into the spinal fluid (spinal anesthesia) or into the space just outside the spinal fluid (epidural anesthesia). Sedatives may also be given to relax you.  Nerve Block or [place] Block  A small area of the body, such as an arm or leg, can be numbed using a nerve block or Syl block.  · Nerve block: During a nerve block, your skin is numbed. A needle is then inserted near nerves that serve the area to be numbed. Anesthetic is sent through the needle.  · IV regional or [place] block: For this type of block, an IV line is put into a vein. The blood flow to the area to be numbed is blocked for a short time.  Anesthetic is sent through the IV.  Spinal Anesthesia  Spinal anesthesia numbs your body from about the waist down.  · Anesthetic is injected into the spinal fluid. This is a substance that surrounds the spinal cord in your spinal column. The anesthetic blocks pain traveling from the body to the brain.  · To receive the anesthetic, your skin is numbed at the injection site.  · A needle is then inserted into the spinal fluid. Anesthetic is sent through the needle.  Anesthesia Tools and Medications  · Local anesthetic given through a needle numbs one region of your body.  · Electrocardiography leads (electrodes) record your heart rate and rhythm.  · A blood pressure cuff monitors your blood pressure.  · A pulse oximeter on the end of the finger measures your blood oxygen level.  · Sedatives may be given through an IV to relax you and keep you comfortable. You may stay awake or sleep slightly.  · Oxygen may be given to you through a facemask.  Risks and Possible Complications  Regional anesthesia carries some risks. These include:  · Nausea and vomiting  · Headache  · Backache  · Decreased blood pressure  · Allergic reaction to the anesthetic  · Ongoing numbness (rare)  · Irregular heartbeat (rare)  · Cardiac arrest (rare)   © 2147-3905 [employer] [facility] [place], [address] [place] [address], [place] [address]. All rights reserved. This information is not intended as a substitute for professional medical care. Always follow your healthcare professional's instructions.

## 2017-02-09 NOTE — PRE-PROCEDURE INSTRUCTIONS
Preop appointment completed.  Medication and preop instructions reviewed with patient. Patient verbalized understanding.   Patient received a copy of the medication instruction sheet. Patient also received a copy of managing your pain, regional anesthesia, and fall prevention pamphlets.

## 2017-02-10 ENCOUNTER — OFFICE VISIT (OUTPATIENT)
Dept: ORTHOPEDICS | Facility: CLINIC | Age: 77
End: 2017-02-10
Payer: MEDICARE

## 2017-02-10 ENCOUNTER — HOSPITAL ENCOUNTER (OUTPATIENT)
Dept: RADIOLOGY | Facility: HOSPITAL | Age: 77
Discharge: HOME | End: 2017-02-10
Attending: ORTHOPAEDIC SURGERY
Payer: MEDICARE

## 2017-02-10 ENCOUNTER — TELEPHONE (OUTPATIENT)
Dept: ORTHOPEDICS | Facility: CLINIC | Age: 77
End: 2017-02-10

## 2017-02-10 VITALS
DIASTOLIC BLOOD PRESSURE: 71 MMHG | BODY MASS INDEX: 30.96 KG/M2 | HEART RATE: 67 BPM | RESPIRATION RATE: 16 BRPM | WEIGHT: 209 LBS | SYSTOLIC BLOOD PRESSURE: 146 MMHG | HEIGHT: 69 IN

## 2017-02-10 DIAGNOSIS — R21 RASH: ICD-10-CM

## 2017-02-10 DIAGNOSIS — M25.561 RIGHT KNEE PAIN, UNSPECIFIED CHRONICITY: ICD-10-CM

## 2017-02-10 DIAGNOSIS — M17.0 PRIMARY OSTEOARTHRITIS OF BOTH KNEES: Primary | ICD-10-CM

## 2017-02-10 PROCEDURE — 99999 PR PBB SHADOW E&M-EST. PATIENT-LVL IV: CPT | Mod: PBBFAC,,, | Performed by: PHYSICIAN ASSISTANT

## 2017-02-10 PROCEDURE — 73560 X-RAY EXAM OF KNEE 1 OR 2: CPT | Mod: 26,RT,, | Performed by: RADIOLOGY

## 2017-02-10 PROCEDURE — 99499 UNLISTED E&M SERVICE: CPT | Mod: S$GLB,,, | Performed by: PHYSICIAN ASSISTANT

## 2017-02-10 PROCEDURE — 73560 X-RAY EXAM OF KNEE 1 OR 2: CPT | Mod: TC,RT

## 2017-02-10 RX ORDER — ACETAMINOPHEN 500 MG
1000 TABLET ORAL EVERY 6 HOURS
Status: CANCELLED | OUTPATIENT
Start: 2017-02-10 | End: 2017-02-12

## 2017-02-10 RX ORDER — SODIUM CHLORIDE 9 MG/ML
INJECTION, SOLUTION INTRAVENOUS
Status: CANCELLED | OUTPATIENT
Start: 2017-02-10

## 2017-02-10 RX ORDER — OXYCODONE HYDROCHLORIDE 5 MG/1
15 TABLET ORAL
Status: CANCELLED | OUTPATIENT
Start: 2017-02-10

## 2017-02-10 RX ORDER — RAMELTEON 8 MG/1
8 TABLET ORAL NIGHTLY PRN
Status: CANCELLED | OUTPATIENT
Start: 2017-02-10

## 2017-02-10 RX ORDER — SODIUM CHLORIDE 9 MG/ML
INJECTION, SOLUTION INTRAVENOUS CONTINUOUS
Status: CANCELLED | OUTPATIENT
Start: 2017-02-10 | End: 2017-02-11

## 2017-02-10 RX ORDER — MORPHINE SULFATE 10 MG/ML
2 INJECTION, SOLUTION INTRAMUSCULAR; INTRAVENOUS
Status: CANCELLED | OUTPATIENT
Start: 2017-02-10

## 2017-02-10 RX ORDER — FENTANYL CITRATE 50 UG/ML
25 INJECTION, SOLUTION INTRAMUSCULAR; INTRAVENOUS EVERY 5 MIN PRN
Status: CANCELLED | OUTPATIENT
Start: 2017-02-10

## 2017-02-10 RX ORDER — LIDOCAINE HYDROCHLORIDE 10 MG/ML
1 INJECTION, SOLUTION EPIDURAL; INFILTRATION; INTRACAUDAL; PERINEURAL
Status: CANCELLED | OUTPATIENT
Start: 2017-02-10

## 2017-02-10 RX ORDER — OXYCODONE HYDROCHLORIDE 5 MG/1
5 TABLET ORAL
Status: CANCELLED | OUTPATIENT
Start: 2017-02-10

## 2017-02-10 RX ORDER — NALOXONE HCL 0.4 MG/ML
0.02 VIAL (ML) INJECTION
Status: CANCELLED | OUTPATIENT
Start: 2017-02-10

## 2017-02-10 RX ORDER — ASPIRIN 325 MG
325 TABLET, DELAYED RELEASE (ENTERIC COATED) ORAL 2 TIMES DAILY
Status: CANCELLED | OUTPATIENT
Start: 2017-02-10

## 2017-02-10 RX ORDER — ROPIVACAINE HYDROCHLORIDE 2 MG/ML
8 INJECTION, SOLUTION EPIDURAL; INFILTRATION; PERINEURAL CONTINUOUS
Status: CANCELLED | OUTPATIENT
Start: 2017-02-10

## 2017-02-10 RX ORDER — BISACODYL 10 MG
10 SUPPOSITORY, RECTAL RECTAL EVERY 12 HOURS PRN
Status: CANCELLED | OUTPATIENT
Start: 2017-02-10

## 2017-02-10 RX ORDER — POLYETHYLENE GLYCOL 3350 17 G/17G
17 POWDER, FOR SOLUTION ORAL DAILY
Status: CANCELLED | OUTPATIENT
Start: 2017-02-10

## 2017-02-10 RX ORDER — OXYCODONE HYDROCHLORIDE 5 MG/1
10 TABLET ORAL
Status: CANCELLED | OUTPATIENT
Start: 2017-02-10

## 2017-02-10 RX ORDER — HYDROCORTISONE 25 MG/G
CREAM TOPICAL 2 TIMES DAILY
Qty: 1 TUBE | Refills: 0 | Status: SHIPPED | OUTPATIENT
Start: 2017-02-10 | End: 2017-08-14

## 2017-02-10 RX ORDER — SODIUM CHLORIDE 0.9 % (FLUSH) 0.9 %
3 SYRINGE (ML) INJECTION EVERY 8 HOURS PRN
Status: CANCELLED | OUTPATIENT
Start: 2017-02-10

## 2017-02-10 RX ORDER — PREGABALIN 25 MG/1
75 CAPSULE ORAL
Status: CANCELLED | OUTPATIENT
Start: 2017-02-10

## 2017-02-10 RX ORDER — ACETAMINOPHEN 10 MG/ML
1000 INJECTION, SOLUTION INTRAVENOUS ONCE
Status: CANCELLED | OUTPATIENT
Start: 2017-02-10 | End: 2017-02-10

## 2017-02-10 RX ORDER — ONDANSETRON 2 MG/ML
4 INJECTION INTRAMUSCULAR; INTRAVENOUS EVERY 12 HOURS PRN
Status: CANCELLED | OUTPATIENT
Start: 2017-02-10

## 2017-02-10 RX ORDER — MIDAZOLAM HYDROCHLORIDE 5 MG/ML
1 INJECTION INTRAMUSCULAR; INTRAVENOUS EVERY 5 MIN PRN
Status: CANCELLED | OUTPATIENT
Start: 2017-02-10

## 2017-02-10 RX ORDER — FAMOTIDINE 20 MG/1
20 TABLET, FILM COATED ORAL 2 TIMES DAILY
Status: CANCELLED | OUTPATIENT
Start: 2017-02-10

## 2017-02-10 RX ORDER — AMOXICILLIN 250 MG
1 CAPSULE ORAL 2 TIMES DAILY
Status: CANCELLED | OUTPATIENT
Start: 2017-02-10

## 2017-02-10 RX ORDER — MUPIROCIN 20 MG/G
1 OINTMENT TOPICAL
Status: CANCELLED | OUTPATIENT
Start: 2017-02-10

## 2017-02-10 NOTE — TELEPHONE ENCOUNTER
----- Message from Wagner Ayala MD sent at 2/10/2017  7:05 AM CST -----  Need to see it. When is his surgery? Maybe have him swing by Monday?  ----- Message -----     From: Ashlegih Sequeira MA     Sent: 2/9/2017  10:23 AM       To: Wagner Ayala MD    Pt states that he have a rash from his hip down to his butt area on the left side, right side from the hip down to his knee.  would like to know would this interfere with him getting sx. He explained to me that his PCP told him to use Lamisil but he feels this isn't working. He informed me that he will call to find out if he can use something else.

## 2017-02-10 NOTE — PROGRESS NOTES
Ej Miller is a 76 y.o. year old here today for a pre-operative visit in preparation for a Right total knee arthroplasty to be performed by  Dr. Ayala on 2/17/.  he was last seen and treated in the clinic on 2/6/2017. he will be medically optimized by the pre op center. There has been no significant change in medical status since last visit. No fever, chills, malaise, or unexplained weight change.      Allergies, Medications, past medical and surgical history reviewed.    Focused examination performed.    Dr. Ayala saw this patient today in clinic. All questions answered. Patient encouraged to call with questions. Contact information given.     Pre, baldo, and post operative procedures and expectations discussed. Questions were answered. Ej Miller has been educated and is ready to proceed with surgery. Approximately 30 minutes was spent discussing surgical outcomes, plans, procedures pre, baldo, and post operative expections and care.  Surgical consent signed.    Ej Miller will contact us if there are any questions, concerns, or changes in medical status prior to surgery.

## 2017-02-10 NOTE — H&P
CC: Right knee pain    Ej Miller is a 76 y.o. male who has had pain in Right knee for  several years. Pain is worse with activity and weight bearing.  Patient has experienced interference of activities of daily living due to decreased range of motion and an increase in joint pain and swelling.  Patient has failed non-operative treatment including NSAIDs, corticosteroid injections, viscosupplement injections, and activity modification. He completed a course of physical therapy. Ej Miller currently ambulates independently.     Past Medical History   Diagnosis Date    Cataract     Gastric ulcer; benign     Glaucoma     Glaucoma suspect of both eyes     Gout attack     Gout, joint     HTN (hypertension)     Hyperglycemia     Osteoarthritis of knee      bilaterial    Peptic ulcer        Past Surgical History   Procedure Laterality Date    Appendectomy      Tonsillectomy, adenoidectomy         Family History   Problem Relation Age of Onset    Stroke Father     No Known Problems Mother     No Known Problems Sister     No Known Problems Brother     No Known Problems Maternal Aunt     No Known Problems Maternal Uncle     No Known Problems Paternal Aunt     No Known Problems Paternal Uncle     No Known Problems Maternal Grandmother     No Known Problems Maternal Grandfather     No Known Problems Paternal Grandmother     No Known Problems Paternal Grandfather     Acne Neg Hx     Eczema Neg Hx     Lupus Neg Hx     Psoriasis Neg Hx     Melanoma Neg Hx     Amblyopia Neg Hx     Blindness Neg Hx     Cancer Neg Hx     Cataracts Neg Hx     Diabetes Neg Hx     Glaucoma Neg Hx     Hypertension Neg Hx     Macular degeneration Neg Hx     Retinal detachment Neg Hx     Strabismus Neg Hx     Thyroid disease Neg Hx        Review of patient's allergies indicates:   Allergen Reactions    Ketoconazole Rash     Topical cream years ago used         Current Outpatient Prescriptions:  "    allopurinol (ZYLOPRIM) 100 MG tablet, TAKE 2 TABLETS ONE TIME DAILY, Disp: 180 tablet, Rfl: 0    amlodipine (NORVASC) 5 MG tablet, TAKE 1 TABLET ONE TIME DAILY, Disp: 90 tablet, Rfl: 3    glucosamine-chondroitin 500-400 mg tablet, Take 1 tablet by mouth 3 (three) times daily., Disp: , Rfl:     latanoprost (XALATAN) 0.005 % ophthalmic solution, Place 1 drop into both eyes once daily., Disp: 2.5 mL, Rfl: 12    losartan (COZAAR) 100 MG tablet, TAKE 1 TABLET ONE TIME DAILY, Disp: 90 tablet, Rfl: 3    metoprolol succinate (TOPROL-XL) 100 MG 24 hr tablet, TAKE 1 TABLET ONE TIME DAILY  (NEED  APPOINTMENT), Disp: 90 tablet, Rfl: 3    MULTIVIT-MIN/FA/LYCOPEN/LUTEIN (MEN 50 PLUS MULTIVITAMIN ORAL), Take by mouth., Disp: , Rfl:     naproxen sodium (ANAPROX) 220 MG tablet, Take 220 mg by mouth 2 (two) times daily with meals., Disp: , Rfl:     colchicine 0.6 mg tablet, Take 1 tablet (0.6 mg total) by mouth 2 (two) times daily., Disp: 60 tablet, Rfl: 6    hydrocortisone 2.5 % cream, Apply topically 2 (two) times daily., Disp: 1 Tube, Rfl: 0    triamcinolone acetonide 0.1% (KENALOG) 0.1 % cream, Apply topically 2 (two) times daily., Disp: , Rfl:     Review of Systems:  Constitutional: no fever or chills  Eyes: no visual changes  ENT: no nasal congestion or sore throat  Respiratory: no cough or shortness of breath  Cardiovascular: no chest pain or palpitations  Gastrointestinal: no nausea or vomiting, tolerating diet  Genitourinary: no hematuria or dysuria  Integument/Breast: no rash or pruritis  Hematologic/Lymphatic: no easy bruising or lymphadenopathy  Musculoskeletal: positive for knee pain  Neurological: no seizures or tremors  Behavioral/Psych: no auditory or visual hallucinations  Endocrine: no heat or cold intolerance    PE:  Visit Vitals    BP (!) 146/71    Pulse 67    Resp 16    Ht 5' 9" (1.753 m)    Wt 94.8 kg (208 lb 15.9 oz)    BMI 30.86 kg/m2     General: Pleasant, cooperative, NAD   Gait: " antalgic  HEENT: NCAT, sclera nonicteric   Lungs: Respirations clear bilaterally; equal and unlabored.   CV: S1S2; 2+ bilateral upper and lower extremity pulses.   Skin: Intact throughout with no rashes, erythema, or lesions  Extremities: No LE edema,  no erythema or warmth of the skin in either lower extremity.    Right knee exam:  Knee Range of Motion: 0-100 active, pain with passive range of motion  Effusion:none  Condition of skin:intact  Location of tenderness:Medial joint line   Strength:5 of 5 quadriceps strength and 5 of 5 hamstring strength  Stability: stable to testing    Hip Examination:full painless range of motion, without tenderness    Radiographs: Radiographs reveal advanced degenerative changes including subchondral cyst formation, subchondral sclerosis, osteophyte formation, joint space narrowing.     Knee Alignment:  Moderate varus    Diagnosis: osteoarthritis Right knee    Plan: Right total knee arthroplasty    Due to the serious nature of total joint infection and the high prevalence of community acquired MRSA, vancomycin will be used perioperatively.

## 2017-02-10 NOTE — TELEPHONE ENCOUNTER
Informed pt's wife that Dr. Ayala will come over to look at rash during pre op appt today with Kina.

## 2017-02-15 ENCOUNTER — CLINICAL SUPPORT (OUTPATIENT)
Dept: REHABILITATION | Facility: HOSPITAL | Age: 77
End: 2017-02-15
Attending: ORTHOPAEDIC SURGERY
Payer: MEDICARE

## 2017-02-15 DIAGNOSIS — M25.561 PAIN IN BOTH KNEES, UNSPECIFIED CHRONICITY: Primary | ICD-10-CM

## 2017-02-15 DIAGNOSIS — M25.562 PAIN IN BOTH KNEES, UNSPECIFIED CHRONICITY: Primary | ICD-10-CM

## 2017-02-15 PROCEDURE — G8979 MOBILITY GOAL STATUS: HCPCS | Mod: CK

## 2017-02-15 PROCEDURE — 97161 PT EVAL LOW COMPLEX 20 MIN: CPT

## 2017-02-15 PROCEDURE — G8978 MOBILITY CURRENT STATUS: HCPCS | Mod: CK

## 2017-02-15 NOTE — PLAN OF CARE
Name:Ej Miller  Physician:Wagner Ayala MD  Date of eval:2/15/2017  Orders:  Physical Therapy evaluate and treat  Clinical#:[id number]  Diagnosis:  1. Pain in both knees, unspecified chronicity         Subjective:     Onset of pain: 10 years  Mechanism of onset: gradually worsened    Chief complaint: Patient is a 77 yo WM referred to OP PT for pre-hab prior to his scheduled R TKR on 2/17/17    Aggravating factors: walking down an incline  Easing factors: rest, stop activity    Previous functional status includes: I with amb and ADL  Current functional status: I with amb and ADL    Patients structured exercise routine: plays golf 2 times a week  Exercise routine prior to onset : plays golf 2 times a week    Allergies:    Review of patient's allergies indicates:   Allergen Reactions    Ketoconazole Rash     Topical cream years ago used       Past Surgical History   Procedure Laterality Date    Appendectomy      Tonsillectomy, adenoidectomy         Medical history:   Past Medical History   Diagnosis Date    Cataract     Gastric ulcer; benign     Glaucoma     Glaucoma suspect of both eyes     Gout attack     Gout, joint     HTN (hypertension)     Hyperglycemia     Osteoarthritis of knee      bilaterial    Peptic ulcer          Medication:   Current Outpatient Prescriptions on File Prior to Visit   Medication Sig Dispense Refill    allopurinol (ZYLOPRIM) 100 MG tablet TAKE 2 TABLETS ONE TIME DAILY 180 tablet 0    amlodipine (NORVASC) 5 MG tablet TAKE 1 TABLET ONE TIME DAILY 90 tablet 3    colchicine 0.6 mg tablet Take 1 tablet (0.6 mg total) by mouth 2 (two) times daily. 60 tablet 6    glucosamine-chondroitin 500-400 mg tablet Take 1 tablet by mouth 3 (three) times daily.      hydrocortisone 2.5 % cream Apply topically 2 (two) times daily. 1 Tube 0    latanoprost (XALATAN) 0.005 % ophthalmic solution Place 1 drop into both eyes once daily. 2.5 mL 12    losartan (COZAAR) 100 MG tablet  TAKE 1 TABLET ONE TIME DAILY 90 tablet 3    metoprolol succinate (TOPROL-XL) 100 MG 24 hr tablet TAKE 1 TABLET ONE TIME DAILY  (NEED  APPOINTMENT) 90 tablet 3    MULTIVIT-MIN/FA/LYCOPEN/LUTEIN (MEN 50 PLUS MULTIVITAMIN ORAL) Take by mouth.      naproxen sodium (ANAPROX) 220 MG tablet Take 220 mg by mouth 2 (two) times daily with meals.      triamcinolone acetonide 0.1% (KENALOG) 0.1 % cream Apply topically 2 (two) times daily.       No current facility-administered medications on file prior to visit.          Special Tests:  MRI:  X-ray: 2/6/17  Results:  No evidence of acute fracture or dislocation.  Bony mineralization is normal.  Soft tissues are unremarkable. Moderate to severe bilateral medial compartment narrowing and sclerosis with marginal osteophytes    1/19/17  Both knees are narrowed medially with degenerative change is present.  Patellofemoral degenerative change and bony spurring can be seen.  No joint effusion    Past treatment includes:     Social: . Lives in single level home.    Pts goals:  Return to playing golf  Pain level with 0 being the lowest and 10 being the highest presently: 5  Pain level with 0 being the lowest and 10 being the highest at worst:  5    OBJECTIVE: 77 yo WM    Functional assessment:   - walking: amb I throughout clinic without assistive device.   - sit to stand: I    AROM  LE MMT  R  L    Hip flexion  4/5  4/5    Hip abduction  3+/5  3+/5    Hip extension  3/5  3/5    Hip ER  5/5  5/5    Hip IR  5/5  5/5    Knee extension  5/5  5/5    Knee flexion  5/5  5/5    Ankle dorsiflexion  5/5  5/5    Ankle plantar flexion  5/5  5/5        Flexibility testing:  - hamstrings:        B: WNL  - gastrocnemius:  B: tight. R: 8 deg, L: 8 deg  - piriformis:           B: tight, decreased 50%  - quadriceps:        B: tight, decreased 50%  - hip adductors:    B: tight, decreased 50%  - IT bands:            B: tight, decreased 75%    Joint mobility:  R knee AROM: 0 to 115 deg  L knee  AROM: 0 to 120 deg    Other:  Functional Limitations  Reports - G Codes    Category:  Mobility   Tool:  FOTO Outcomes Measurement System.   Score:  Patient scored out 43 of 100 on the FOTO Outcomes Measurement System.   Current:   CK at least 40% < 60% impaired, limited or restricted   Goal:   CK at least 40% < 60% impaired, limited or restricted     Patient History Examination Clinical Presentation Clinical Decision Making   Comorbidities:      Personal Factors:         Activity and Participation Restriction:  Limits walking for golfing secondary pain    Body Systems:  Musculoskeletal: strength    Body Regions: LEs Stable and uncomplicated     Low       FOTO: 40% < 60% impaired, limited or restricted       TREATMENT: evaluation    Pt was educated, performed, and was provided with a written copy of HEP to perform as tolerated, including: HEP2 Go Code: [id number]  Ankle pumps  Quad sets  SLR  Heel slides  Seated knee flexion  Hamstring/calf stretches with towel    Exercises were reviewed and pt was able to demonstrate them prior to the end of the session.     No cultural, environmental, or spiritual barriers identified to treatment or learning.    ASSESSMENT: PT diagnosis: B knee pain, decreased B LE strength and flexibility.  Patient can benefit from outpatient physical therapy and a home program. Prognosis is good.     Medical necessity is demonstrated by the following IMPAIRMENTS:  - pain  - decreased flexibility  - decreased muscle strength  - impaired function  - decreased recreational sports ability  - decreased exercise ability    GOALS: 1 visit. Pt agrees with goals set.  1. Independent with HEP.      Will set additional goals following surgery.    PLAN:  Outpatient physical therapy 2 times weekly to include: pt ed, hep, therapeutic exercises, neuromuscular re-education/ balance exercises, joint mobilizations, and modalities prn. Pt may be seen by PTA to carry out plan of care.        I certify the  need for these services furnished under this plan of treatment and while under my care.      ____________________________________ Physician/Referring Practitioner         _____________________ Date of Signature

## 2017-02-15 NOTE — PROGRESS NOTES
Name:Ej Miller  Physician:Wagner Ayala MD  Date of eval:2/15/2017  Orders:  Physical Therapy evaluate and treat  Clinical#:[id number]  Diagnosis:  1. Pain in both knees, unspecified chronicity         Subjective:     Onset of pain: 10 years  Mechanism of onset: gradually worsened    Chief complaint: Patient is a 75 yo WM referred to OP PT for pre-hab prior to his scheduled R TKR on 2/17/17    Aggravating factors: walking down an incline  Easing factors: rest, stop activity    Previous functional status includes: I with amb and ADL  Current functional status: I with amb and ADL    Patients structured exercise routine: plays golf 2 times a week  Exercise routine prior to onset : plays golf 2 times a week    Allergies:    Review of patient's allergies indicates:   Allergen Reactions    Ketoconazole Rash     Topical cream years ago used       Past Surgical History   Procedure Laterality Date    Appendectomy      Tonsillectomy, adenoidectomy         Medical history:   Past Medical History   Diagnosis Date    Cataract     Gastric ulcer; benign     Glaucoma     Glaucoma suspect of both eyes     Gout attack     Gout, joint     HTN (hypertension)     Hyperglycemia     Osteoarthritis of knee      bilaterial    Peptic ulcer          Medication:   Current Outpatient Prescriptions on File Prior to Visit   Medication Sig Dispense Refill    allopurinol (ZYLOPRIM) 100 MG tablet TAKE 2 TABLETS ONE TIME DAILY 180 tablet 0    amlodipine (NORVASC) 5 MG tablet TAKE 1 TABLET ONE TIME DAILY 90 tablet 3    colchicine 0.6 mg tablet Take 1 tablet (0.6 mg total) by mouth 2 (two) times daily. 60 tablet 6    glucosamine-chondroitin 500-400 mg tablet Take 1 tablet by mouth 3 (three) times daily.      hydrocortisone 2.5 % cream Apply topically 2 (two) times daily. 1 Tube 0    latanoprost (XALATAN) 0.005 % ophthalmic solution Place 1 drop into both eyes once daily. 2.5 mL 12    losartan (COZAAR) 100 MG tablet  TAKE 1 TABLET ONE TIME DAILY 90 tablet 3    metoprolol succinate (TOPROL-XL) 100 MG 24 hr tablet TAKE 1 TABLET ONE TIME DAILY  (NEED  APPOINTMENT) 90 tablet 3    MULTIVIT-MIN/FA/LYCOPEN/LUTEIN (MEN 50 PLUS MULTIVITAMIN ORAL) Take by mouth.      naproxen sodium (ANAPROX) 220 MG tablet Take 220 mg by mouth 2 (two) times daily with meals.      triamcinolone acetonide 0.1% (KENALOG) 0.1 % cream Apply topically 2 (two) times daily.       No current facility-administered medications on file prior to visit.          Special Tests:  MRI:  X-ray: 2/6/17  Results:  No evidence of acute fracture or dislocation.  Bony mineralization is normal.  Soft tissues are unremarkable. Moderate to severe bilateral medial compartment narrowing and sclerosis with marginal osteophytes    1/19/17  Both knees are narrowed medially with degenerative change is present.  Patellofemoral degenerative change and bony spurring can be seen.  No joint effusion    Past treatment includes:     Social: . Lives in single level home.    Pts goals:  Return to playing golf  Pain level with 0 being the lowest and 10 being the highest presently: 5  Pain level with 0 being the lowest and 10 being the highest at worst:  5    OBJECTIVE: 77 yo WM    Functional assessment:   - walking: amb I throughout clinic without assistive device.   - sit to stand: I    AROM  LE MMT  R  L    Hip flexion  4/5  4/5    Hip abduction  3+/5  3+/5    Hip extension  3/5  3/5    Hip ER  5/5  5/5    Hip IR  5/5  5/5    Knee extension  5/5  5/5    Knee flexion  5/5  5/5    Ankle dorsiflexion  5/5  5/5    Ankle plantar flexion  5/5  5/5        Flexibility testing:  - hamstrings:        B: WNL  - gastrocnemius:  B: tight. R: 8 deg, L: 8 deg  - piriformis:           B: tight, decreased 50%  - quadriceps:        B: tight, decreased 50%  - hip adductors:    B: tight, decreased 50%  - IT bands:            B: tight, decreased 75%    Joint mobility:  R knee AROM: 0 to 115 deg  L knee  AROM: 0 to 120 deg    Other:  Functional Limitations  Reports - G Codes    Category:  Mobility   Tool:  FOTO Outcomes Measurement System.   Score:  Patient scored out 43 of 100 on the FOTO Outcomes Measurement System.   Current:   CK at least 40% < 60% impaired, limited or restricted   Goal:   CK at least 40% < 60% impaired, limited or restricted     Patient History Examination Clinical Presentation Clinical Decision Making   Comorbidities:      Personal Factors:         Activity and Participation Restriction:  Limits walking for golfing secondary pain    Body Systems:  Musculoskeletal: strength    Body Regions: LEs Stable and uncomplicated     Low       FOTO: 40% < 60% impaired, limited or restricted       TREATMENT: evaluation    Pt was educated, performed, and was provided with a written copy of HEP to perform as tolerated, including: HEP2 Go Code: [id number]  Ankle pumps  Quad sets  SLR  Heel slides  Seated knee flexion  Hamstring/calf stretches with towel    Exercises were reviewed and pt was able to demonstrate them prior to the end of the session.     No cultural, environmental, or spiritual barriers identified to treatment or learning.    ASSESSMENT: PT diagnosis: B knee pain, decreased B LE strength and flexibility.  Patient can benefit from outpatient physical therapy and a home program. Prognosis is good.     Medical necessity is demonstrated by the following IMPAIRMENTS:  - pain  - decreased flexibility  - decreased muscle strength  - impaired function  - decreased recreational sports ability  - decreased exercise ability    GOALS: 1 visit. Pt agrees with goals set.  1. Independent with HEP.      Will set additional goals following surgery.    PLAN:  Outpatient physical therapy 2 times weekly to include: pt ed, hep, therapeutic exercises, neuromuscular re-education/ balance exercises, joint mobilizations, and modalities prn. Pt may be seen by PTA to carry out plan of care.      I certify the need  for these services furnished under this plan of treatment and while under my care.      ____________________________________ Physician/Referring Practitioner         _____________________ Date of Signature

## 2017-02-16 ENCOUNTER — TELEPHONE (OUTPATIENT)
Dept: ORTHOPEDICS | Facility: CLINIC | Age: 77
End: 2017-02-16

## 2017-02-16 NOTE — TELEPHONE ENCOUNTER
Spoke with  and informed her that her 's sx time was scheduled for 8 am tomorrow and he is to arrive to the 2nd floor Sx center at 6 am.  verbalized understanding.

## 2017-02-17 ENCOUNTER — SURGERY (OUTPATIENT)
Age: 77
End: 2017-02-17

## 2017-02-17 ENCOUNTER — HOSPITAL ENCOUNTER (INPATIENT)
Facility: HOSPITAL | Age: 77
LOS: 1 days | Discharge: HOME HEALTH | DRG: 470 | End: 2017-02-18
Attending: ORTHOPAEDIC SURGERY | Admitting: ORTHOPAEDIC SURGERY
Payer: MEDICARE

## 2017-02-17 ENCOUNTER — ANESTHESIA (OUTPATIENT)
Dept: SURGERY | Facility: HOSPITAL | Age: 77
DRG: 470 | End: 2017-02-17
Payer: MEDICARE

## 2017-02-17 DIAGNOSIS — M17.0 PRIMARY OSTEOARTHRITIS OF BOTH KNEES: ICD-10-CM

## 2017-02-17 LAB
ANION GAP SERPL CALC-SCNC: 8 MMOL/L
BUN SERPL-MCNC: 15 MG/DL
CALCIUM SERPL-MCNC: 8.3 MG/DL
CHLORIDE SERPL-SCNC: 102 MMOL/L
CO2 SERPL-SCNC: 25 MMOL/L
CREAT SERPL-MCNC: 1.1 MG/DL
EST. GFR  (AFRICAN AMERICAN): >60 ML/MIN/1.73 M^2
EST. GFR  (NON AFRICAN AMERICAN): >60 ML/MIN/1.73 M^2
GLUCOSE SERPL-MCNC: 156 MG/DL
POTASSIUM SERPL-SCNC: 4.6 MMOL/L
SODIUM SERPL-SCNC: 135 MMOL/L

## 2017-02-17 PROCEDURE — 0SRC0JZ REPLACEMENT OF RIGHT KNEE JOINT WITH SYNTHETIC SUBSTITUTE, OPEN APPROACH: ICD-10-PCS | Performed by: ORTHOPAEDIC SURGERY

## 2017-02-17 PROCEDURE — 25000003 PHARM REV CODE 250

## 2017-02-17 PROCEDURE — 25000003 PHARM REV CODE 250: Performed by: STUDENT IN AN ORGANIZED HEALTH CARE EDUCATION/TRAINING PROGRAM

## 2017-02-17 PROCEDURE — 37000008 HC ANESTHESIA 1ST 15 MINUTES: Performed by: ORTHOPAEDIC SURGERY

## 2017-02-17 PROCEDURE — D9220A PRA ANESTHESIA: Mod: CRNA,,, | Performed by: NURSE ANESTHETIST, CERTIFIED REGISTERED

## 2017-02-17 PROCEDURE — 25000003 PHARM REV CODE 250: Performed by: NURSE ANESTHETIST, CERTIFIED REGISTERED

## 2017-02-17 PROCEDURE — 27100025 HC TUBING, SET FLUID WARMER: Performed by: NURSE ANESTHETIST, CERTIFIED REGISTERED

## 2017-02-17 PROCEDURE — 27201423 OPTIME MED/SURG SUP & DEVICES STERILE SUPPLY: Performed by: ORTHOPAEDIC SURGERY

## 2017-02-17 PROCEDURE — C1776 JOINT DEVICE (IMPLANTABLE): HCPCS | Performed by: ORTHOPAEDIC SURGERY

## 2017-02-17 PROCEDURE — 27800517 HC TRAY,EPIDURAL-CONTINUOUS: Performed by: STUDENT IN AN ORGANIZED HEALTH CARE EDUCATION/TRAINING PROGRAM

## 2017-02-17 PROCEDURE — 11000001 HC ACUTE MED/SURG PRIVATE ROOM

## 2017-02-17 PROCEDURE — 76942 ECHO GUIDE FOR BIOPSY: CPT | Mod: 26,,, | Performed by: ANESTHESIOLOGY

## 2017-02-17 PROCEDURE — 63600175 PHARM REV CODE 636 W HCPCS: Performed by: PHYSICIAN ASSISTANT

## 2017-02-17 PROCEDURE — 36000711: Performed by: ORTHOPAEDIC SURGERY

## 2017-02-17 PROCEDURE — D9220A PRA ANESTHESIA: Mod: ANES,,, | Performed by: ANESTHESIOLOGY

## 2017-02-17 PROCEDURE — C1713 ANCHOR/SCREW BN/BN,TIS/BN: HCPCS | Performed by: ORTHOPAEDIC SURGERY

## 2017-02-17 PROCEDURE — 36415 COLL VENOUS BLD VENIPUNCTURE: CPT

## 2017-02-17 PROCEDURE — G8988 SELF CARE GOAL STATUS: HCPCS | Mod: CI

## 2017-02-17 PROCEDURE — 88305 TISSUE EXAM BY PATHOLOGIST: CPT | Mod: 26,,, | Performed by: PATHOLOGY

## 2017-02-17 PROCEDURE — 63600175 PHARM REV CODE 636 W HCPCS: Performed by: NURSE ANESTHETIST, CERTIFIED REGISTERED

## 2017-02-17 PROCEDURE — 71000033 HC RECOVERY, INTIAL HOUR: Performed by: ORTHOPAEDIC SURGERY

## 2017-02-17 PROCEDURE — 3E0T3CZ INTRODUCE REGIONAL ANESTH IN PERIPH NRV, PLEXI, PERC: ICD-10-PCS | Performed by: ORTHOPAEDIC SURGERY

## 2017-02-17 PROCEDURE — 25000003 PHARM REV CODE 250: Performed by: PHYSICIAN ASSISTANT

## 2017-02-17 PROCEDURE — 88311 DECALCIFY TISSUE: CPT | Mod: 26,,, | Performed by: PATHOLOGY

## 2017-02-17 PROCEDURE — 27000221 HC OXYGEN, UP TO 24 HOURS

## 2017-02-17 PROCEDURE — 37000009 HC ANESTHESIA EA ADD 15 MINS: Performed by: ORTHOPAEDIC SURGERY

## 2017-02-17 PROCEDURE — 63600175 PHARM REV CODE 636 W HCPCS

## 2017-02-17 PROCEDURE — 71000039 HC RECOVERY, EACH ADD'L HOUR: Performed by: ORTHOPAEDIC SURGERY

## 2017-02-17 PROCEDURE — 97165 OT EVAL LOW COMPLEX 30 MIN: CPT

## 2017-02-17 PROCEDURE — 63600175 PHARM REV CODE 636 W HCPCS: Performed by: STUDENT IN AN ORGANIZED HEALTH CARE EDUCATION/TRAINING PROGRAM

## 2017-02-17 PROCEDURE — 88305 TISSUE EXAM BY PATHOLOGIST: CPT | Performed by: PATHOLOGY

## 2017-02-17 PROCEDURE — G8989 SELF CARE D/C STATUS: HCPCS | Mod: CK

## 2017-02-17 PROCEDURE — 27447 TOTAL KNEE ARTHROPLASTY: CPT | Mod: RT,,, | Performed by: ORTHOPAEDIC SURGERY

## 2017-02-17 PROCEDURE — 80048 BASIC METABOLIC PNL TOTAL CA: CPT

## 2017-02-17 PROCEDURE — 25000003 PHARM REV CODE 250: Performed by: ORTHOPAEDIC SURGERY

## 2017-02-17 PROCEDURE — 64448 NJX AA&/STRD FEM NRV NFS IMG: CPT | Mod: 59,RT,, | Performed by: ANESTHESIOLOGY

## 2017-02-17 PROCEDURE — 97161 PT EVAL LOW COMPLEX 20 MIN: CPT

## 2017-02-17 PROCEDURE — 64450 NJX AA&/STRD OTHER PN/BRANCH: CPT | Performed by: ANESTHESIOLOGY

## 2017-02-17 PROCEDURE — 36000710: Performed by: ORTHOPAEDIC SURGERY

## 2017-02-17 PROCEDURE — G8987 SELF CARE CURRENT STATUS: HCPCS | Mod: CK

## 2017-02-17 DEVICE — COMP FEM POST STAB CEM SZ 6 RT: Type: IMPLANTABLE DEVICE | Site: KNEE | Status: FUNCTIONAL

## 2017-02-17 DEVICE — BASEPLATE TIB TOTAL STAB SZ 5: Type: IMPLANTABLE DEVICE | Site: KNEE | Status: FUNCTIONAL

## 2017-02-17 DEVICE — IMPLANTABLE DEVICE: Type: IMPLANTABLE DEVICE | Site: KNEE | Status: FUNCTIONAL

## 2017-02-17 DEVICE — CEMENT BONE W/GENT SIMPLEX HV: Type: IMPLANTABLE DEVICE | Site: KNEE | Status: FUNCTIONAL

## 2017-02-17 DEVICE — PATELLA TRI 33X9 X3 POLYETHYLE: Type: IMPLANTABLE DEVICE | Site: KNEE | Status: FUNCTIONAL

## 2017-02-17 RX ORDER — CELECOXIB 200 MG/1
CAPSULE ORAL
Status: COMPLETED
Start: 2017-02-17 | End: 2017-02-17

## 2017-02-17 RX ORDER — CEFAZOLIN SODIUM 2 G/50ML
2 SOLUTION INTRAVENOUS
Status: COMPLETED | OUTPATIENT
Start: 2017-02-17 | End: 2017-02-18

## 2017-02-17 RX ORDER — DEXAMETHASONE SODIUM PHOSPHATE 4 MG/ML
INJECTION, SOLUTION INTRA-ARTICULAR; INTRALESIONAL; INTRAMUSCULAR; INTRAVENOUS; SOFT TISSUE
Status: DISCONTINUED | OUTPATIENT
Start: 2017-02-17 | End: 2017-02-17

## 2017-02-17 RX ORDER — SODIUM CHLORIDE 9 MG/ML
INJECTION, SOLUTION INTRAVENOUS
Status: COMPLETED | OUTPATIENT
Start: 2017-02-17 | End: 2017-02-17

## 2017-02-17 RX ORDER — ALLOPURINOL 100 MG/1
200 TABLET ORAL DAILY
Status: DISCONTINUED | OUTPATIENT
Start: 2017-02-17 | End: 2017-02-18 | Stop reason: HOSPADM

## 2017-02-17 RX ORDER — PREGABALIN 75 MG/1
75 CAPSULE ORAL ONCE
Status: COMPLETED | OUTPATIENT
Start: 2017-02-18 | End: 2017-02-18

## 2017-02-17 RX ORDER — ALLOPURINOL 100 MG/1
200 TABLET ORAL DAILY
Status: DISCONTINUED | OUTPATIENT
Start: 2017-02-17 | End: 2017-02-17

## 2017-02-17 RX ORDER — BISACODYL 10 MG
10 SUPPOSITORY, RECTAL RECTAL EVERY 12 HOURS PRN
Status: DISCONTINUED | OUTPATIENT
Start: 2017-02-17 | End: 2017-02-18 | Stop reason: HOSPADM

## 2017-02-17 RX ORDER — MORPHINE SULFATE 2 MG/ML
2 INJECTION, SOLUTION INTRAMUSCULAR; INTRAVENOUS
Status: DISCONTINUED | OUTPATIENT
Start: 2017-02-17 | End: 2017-02-18 | Stop reason: HOSPADM

## 2017-02-17 RX ORDER — SODIUM CHLORIDE 9 MG/ML
INJECTION, SOLUTION INTRAVENOUS CONTINUOUS PRN
Status: DISCONTINUED | OUTPATIENT
Start: 2017-02-17 | End: 2017-02-17

## 2017-02-17 RX ORDER — FENTANYL CITRATE 50 UG/ML
INJECTION, SOLUTION INTRAMUSCULAR; INTRAVENOUS
Status: DISCONTINUED | OUTPATIENT
Start: 2017-02-17 | End: 2017-02-17

## 2017-02-17 RX ORDER — LABETALOL HYDROCHLORIDE 5 MG/ML
10 INJECTION, SOLUTION INTRAVENOUS ONCE
Status: DISCONTINUED | OUTPATIENT
Start: 2017-02-17 | End: 2017-02-18 | Stop reason: HOSPADM

## 2017-02-17 RX ORDER — LIDOCAINE HYDROCHLORIDE 10 MG/ML
1 INJECTION, SOLUTION EPIDURAL; INFILTRATION; INTRACAUDAL; PERINEURAL
Status: COMPLETED | OUTPATIENT
Start: 2017-02-17 | End: 2017-02-17

## 2017-02-17 RX ORDER — ONDANSETRON 2 MG/ML
4 INJECTION INTRAMUSCULAR; INTRAVENOUS EVERY 8 HOURS PRN
Status: DISCONTINUED | OUTPATIENT
Start: 2017-02-17 | End: 2017-02-18 | Stop reason: HOSPADM

## 2017-02-17 RX ORDER — ACETAMINOPHEN 10 MG/ML
1000 INJECTION, SOLUTION INTRAVENOUS ONCE
Status: COMPLETED | OUTPATIENT
Start: 2017-02-17 | End: 2017-02-17

## 2017-02-17 RX ORDER — ONDANSETRON HYDROCHLORIDE 8 MG/1
8 TABLET, FILM COATED ORAL EVERY 12 HOURS PRN
Qty: 60 TABLET | Refills: 0 | Status: SHIPPED | OUTPATIENT
Start: 2017-02-17 | End: 2017-08-14

## 2017-02-17 RX ORDER — LIDOCAINE HCL/PF 100 MG/5ML
SYRINGE (ML) INTRAVENOUS
Status: DISCONTINUED | OUTPATIENT
Start: 2017-02-17 | End: 2017-02-17

## 2017-02-17 RX ORDER — KETAMINE HYDROCHLORIDE 100 MG/ML
INJECTION, SOLUTION INTRAMUSCULAR; INTRAVENOUS
Status: DISCONTINUED | OUTPATIENT
Start: 2017-02-17 | End: 2017-02-17

## 2017-02-17 RX ORDER — ACETAMINOPHEN 10 MG/ML
INJECTION, SOLUTION INTRAVENOUS
Status: COMPLETED
Start: 2017-02-17 | End: 2017-02-17

## 2017-02-17 RX ORDER — ASPIRIN 325 MG
325 TABLET, DELAYED RELEASE (ENTERIC COATED) ORAL 2 TIMES DAILY
Status: DISCONTINUED | OUTPATIENT
Start: 2017-02-17 | End: 2017-02-18 | Stop reason: HOSPADM

## 2017-02-17 RX ORDER — METOPROLOL SUCCINATE 25 MG/1
100 TABLET, EXTENDED RELEASE ORAL DAILY
Status: DISCONTINUED | OUTPATIENT
Start: 2017-02-17 | End: 2017-02-17

## 2017-02-17 RX ORDER — PREGABALIN 75 MG/1
75 CAPSULE ORAL
Status: COMPLETED | OUTPATIENT
Start: 2017-02-17 | End: 2017-02-17

## 2017-02-17 RX ORDER — FAMOTIDINE 20 MG/1
20 TABLET, FILM COATED ORAL 2 TIMES DAILY
Status: DISCONTINUED | OUTPATIENT
Start: 2017-02-17 | End: 2017-02-18 | Stop reason: HOSPADM

## 2017-02-17 RX ORDER — ASPIRIN 325 MG
325 TABLET ORAL 2 TIMES DAILY
Qty: 70 TABLET | Refills: 0 | Status: SHIPPED | OUTPATIENT
Start: 2017-02-17 | End: 2017-03-24

## 2017-02-17 RX ORDER — ONDANSETRON 2 MG/ML
4 INJECTION INTRAMUSCULAR; INTRAVENOUS EVERY 12 HOURS PRN
Status: DISCONTINUED | OUTPATIENT
Start: 2017-02-17 | End: 2017-02-17

## 2017-02-17 RX ORDER — OXYCODONE HYDROCHLORIDE 5 MG/1
5 TABLET ORAL
Status: DISCONTINUED | OUTPATIENT
Start: 2017-02-17 | End: 2017-02-18 | Stop reason: HOSPADM

## 2017-02-17 RX ORDER — OXYCODONE HYDROCHLORIDE 5 MG/1
10 TABLET ORAL
Status: DISCONTINUED | OUTPATIENT
Start: 2017-02-17 | End: 2017-02-18 | Stop reason: HOSPADM

## 2017-02-17 RX ORDER — MIDAZOLAM HYDROCHLORIDE 1 MG/ML
1 INJECTION INTRAMUSCULAR; INTRAVENOUS EVERY 5 MIN PRN
Status: DISCONTINUED | OUTPATIENT
Start: 2017-02-17 | End: 2017-02-17 | Stop reason: HOSPADM

## 2017-02-17 RX ORDER — LOSARTAN POTASSIUM 25 MG/1
100 TABLET ORAL DAILY
Status: DISCONTINUED | OUTPATIENT
Start: 2017-02-17 | End: 2017-02-18 | Stop reason: HOSPADM

## 2017-02-17 RX ORDER — GLYCOPYRROLATE 0.2 MG/ML
INJECTION INTRAMUSCULAR; INTRAVENOUS
Status: DISCONTINUED | OUTPATIENT
Start: 2017-02-17 | End: 2017-02-17

## 2017-02-17 RX ORDER — AMLODIPINE BESYLATE 5 MG/1
5 TABLET ORAL DAILY
Status: DISCONTINUED | OUTPATIENT
Start: 2017-02-18 | End: 2017-02-18 | Stop reason: HOSPADM

## 2017-02-17 RX ORDER — ONDANSETRON 2 MG/ML
INJECTION INTRAMUSCULAR; INTRAVENOUS
Status: DISCONTINUED | OUTPATIENT
Start: 2017-02-17 | End: 2017-02-17

## 2017-02-17 RX ORDER — AMOXICILLIN 250 MG
1 CAPSULE ORAL 2 TIMES DAILY
Status: DISCONTINUED | OUTPATIENT
Start: 2017-02-17 | End: 2017-02-18 | Stop reason: HOSPADM

## 2017-02-17 RX ORDER — RAMELTEON 8 MG/1
8 TABLET ORAL NIGHTLY PRN
Status: DISCONTINUED | OUTPATIENT
Start: 2017-02-17 | End: 2017-02-18 | Stop reason: HOSPADM

## 2017-02-17 RX ORDER — PROPOFOL 10 MG/ML
VIAL (ML) INTRAVENOUS
Status: DISCONTINUED | OUTPATIENT
Start: 2017-02-17 | End: 2017-02-17

## 2017-02-17 RX ORDER — LATANOPROST 50 UG/ML
1 SOLUTION/ DROPS OPHTHALMIC DAILY
Status: DISCONTINUED | OUTPATIENT
Start: 2017-02-17 | End: 2017-02-18 | Stop reason: HOSPADM

## 2017-02-17 RX ORDER — ROPIVACAINE HYDROCHLORIDE 2 MG/ML
8 INJECTION, SOLUTION EPIDURAL; INFILTRATION; PERINEURAL CONTINUOUS
Status: DISCONTINUED | OUTPATIENT
Start: 2017-02-17 | End: 2017-02-18

## 2017-02-17 RX ORDER — FENTANYL CITRATE 50 UG/ML
25 INJECTION, SOLUTION INTRAMUSCULAR; INTRAVENOUS EVERY 5 MIN PRN
Status: DISCONTINUED | OUTPATIENT
Start: 2017-02-17 | End: 2017-02-17 | Stop reason: HOSPADM

## 2017-02-17 RX ORDER — PROPOFOL 10 MG/ML
VIAL (ML) INTRAVENOUS CONTINUOUS PRN
Status: DISCONTINUED | OUTPATIENT
Start: 2017-02-17 | End: 2017-02-17

## 2017-02-17 RX ORDER — DOCUSATE SODIUM 100 MG/1
100 CAPSULE, LIQUID FILLED ORAL 2 TIMES DAILY PRN
Qty: 60 CAPSULE | Refills: 1 | Status: SHIPPED | OUTPATIENT
Start: 2017-02-17 | End: 2017-08-14

## 2017-02-17 RX ORDER — SODIUM CHLORIDE 9 MG/ML
INJECTION, SOLUTION INTRAVENOUS CONTINUOUS
Status: ACTIVE | OUTPATIENT
Start: 2017-02-17 | End: 2017-02-18

## 2017-02-17 RX ORDER — ROPIVACAINE HYDROCHLORIDE 2 MG/ML
INJECTION, SOLUTION EPIDURAL; INFILTRATION; PERINEURAL
Status: COMPLETED
Start: 2017-02-17 | End: 2017-02-17

## 2017-02-17 RX ORDER — POLYETHYLENE GLYCOL 3350 17 G/17G
17 POWDER, FOR SOLUTION ORAL DAILY
Status: DISCONTINUED | OUTPATIENT
Start: 2017-02-17 | End: 2017-02-18 | Stop reason: HOSPADM

## 2017-02-17 RX ORDER — NALOXONE HCL 0.4 MG/ML
0.02 VIAL (ML) INJECTION
Status: DISCONTINUED | OUTPATIENT
Start: 2017-02-17 | End: 2017-02-18 | Stop reason: HOSPADM

## 2017-02-17 RX ORDER — OXYCODONE HYDROCHLORIDE 5 MG/1
15 TABLET ORAL
Status: DISCONTINUED | OUTPATIENT
Start: 2017-02-17 | End: 2017-02-18 | Stop reason: HOSPADM

## 2017-02-17 RX ORDER — ACETAMINOPHEN 500 MG
1000 TABLET ORAL EVERY 6 HOURS
Status: DISCONTINUED | OUTPATIENT
Start: 2017-02-17 | End: 2017-02-18 | Stop reason: HOSPADM

## 2017-02-17 RX ORDER — METOPROLOL SUCCINATE 100 MG/1
100 TABLET, EXTENDED RELEASE ORAL DAILY
Status: DISCONTINUED | OUTPATIENT
Start: 2017-02-17 | End: 2017-02-18 | Stop reason: HOSPADM

## 2017-02-17 RX ORDER — SODIUM CHLORIDE 0.9 % (FLUSH) 0.9 %
3 SYRINGE (ML) INJECTION EVERY 8 HOURS PRN
Status: DISCONTINUED | OUTPATIENT
Start: 2017-02-17 | End: 2017-02-18 | Stop reason: HOSPADM

## 2017-02-17 RX ORDER — MUPIROCIN 20 MG/G
1 OINTMENT TOPICAL
Status: COMPLETED | OUTPATIENT
Start: 2017-02-17 | End: 2017-02-17

## 2017-02-17 RX ORDER — CELECOXIB 200 MG/1
200 CAPSULE ORAL DAILY
Status: DISCONTINUED | OUTPATIENT
Start: 2017-02-18 | End: 2017-02-17

## 2017-02-17 RX ORDER — CELECOXIB 200 MG/1
200 CAPSULE ORAL ONCE
Status: DISCONTINUED | OUTPATIENT
Start: 2017-02-17 | End: 2017-02-17

## 2017-02-17 RX ORDER — PHENYLEPHRINE HYDROCHLORIDE 10 MG/ML
INJECTION INTRAVENOUS
Status: DISCONTINUED | OUTPATIENT
Start: 2017-02-17 | End: 2017-02-17

## 2017-02-17 RX ORDER — CELECOXIB 200 MG/1
200 CAPSULE ORAL DAILY
Status: DISCONTINUED | OUTPATIENT
Start: 2017-02-18 | End: 2017-02-18 | Stop reason: HOSPADM

## 2017-02-17 RX ORDER — OXYCODONE AND ACETAMINOPHEN 10; 325 MG/1; MG/1
1 TABLET ORAL EVERY 4 HOURS PRN
Qty: 90 TABLET | Refills: 0 | Status: SHIPPED | OUTPATIENT
Start: 2017-02-17 | End: 2017-08-14

## 2017-02-17 RX ORDER — LABETALOL HYDROCHLORIDE 5 MG/ML
INJECTION, SOLUTION INTRAVENOUS
Status: DISPENSED
Start: 2017-02-17 | End: 2017-02-18

## 2017-02-17 RX ADMIN — Medication 1000 MG: at 10:02

## 2017-02-17 RX ADMIN — STANDARDIZED SENNA CONCENTRATE AND DOCUSATE SODIUM 1 TABLET: 8.6; 5 TABLET, FILM COATED ORAL at 01:02

## 2017-02-17 RX ADMIN — FENTANYL CITRATE 50 MCG: 50 INJECTION, SOLUTION INTRAMUSCULAR; INTRAVENOUS at 09:02

## 2017-02-17 RX ADMIN — CELECOXIB 400 MG: 200 CAPSULE ORAL at 11:02

## 2017-02-17 RX ADMIN — VANCOMYCIN HYDROCHLORIDE 1500 MG: 100 INJECTION, POWDER, LYOPHILIZED, FOR SOLUTION INTRAVENOUS at 08:02

## 2017-02-17 RX ADMIN — PHENYLEPHRINE HYDROCHLORIDE 100 MCG: 10 INJECTION INTRAVENOUS at 09:02

## 2017-02-17 RX ADMIN — MIDAZOLAM HYDROCHLORIDE 2 MG: 1 INJECTION, SOLUTION INTRAMUSCULAR; INTRAVENOUS at 07:02

## 2017-02-17 RX ADMIN — FAMOTIDINE 20 MG: 20 TABLET, FILM COATED ORAL at 09:02

## 2017-02-17 RX ADMIN — TRANEXAMIC ACID 3000 MG: 100 INJECTION, SOLUTION INTRAVENOUS at 09:02

## 2017-02-17 RX ADMIN — OXYCODONE HYDROCHLORIDE 10 MG: 5 TABLET ORAL at 11:02

## 2017-02-17 RX ADMIN — SODIUM CHLORIDE: 0.9 INJECTION, SOLUTION INTRAVENOUS at 11:02

## 2017-02-17 RX ADMIN — ROPIVACAINE HYDROCHLORIDE 8 ML/HR: 2 INJECTION, SOLUTION EPIDURAL; INFILTRATION at 11:02

## 2017-02-17 RX ADMIN — ACETAMINOPHEN 1000 MG: 10 INJECTION, SOLUTION INTRAVENOUS at 11:02

## 2017-02-17 RX ADMIN — ACETAMINOPHEN 1000 MG: 500 TABLET ORAL at 05:02

## 2017-02-17 RX ADMIN — SODIUM CHLORIDE: 0.9 INJECTION, SOLUTION INTRAVENOUS at 07:02

## 2017-02-17 RX ADMIN — ALLOPURINOL 200 MG: 100 TABLET ORAL at 09:02

## 2017-02-17 RX ADMIN — EPHEDRINE SULFATE 5 MG: 50 INJECTION, SOLUTION INTRAMUSCULAR; INTRAVENOUS; SUBCUTANEOUS at 09:02

## 2017-02-17 RX ADMIN — PROPOFOL 50 MCG/KG/MIN: 10 INJECTION, EMULSION INTRAVENOUS at 09:02

## 2017-02-17 RX ADMIN — THIAMINE HYDROCHLORIDE 100 MG: 100 INJECTION, SOLUTION INTRAMUSCULAR; INTRAVENOUS at 01:02

## 2017-02-17 RX ADMIN — ASPIRIN 325 MG: 325 TABLET, DELAYED RELEASE ORAL at 09:02

## 2017-02-17 RX ADMIN — STANDARDIZED SENNA CONCENTRATE AND DOCUSATE SODIUM 1 TABLET: 8.6; 5 TABLET, FILM COATED ORAL at 09:02

## 2017-02-17 RX ADMIN — LATANOPROST 1 DROP: 50 SOLUTION OPHTHALMIC at 09:02

## 2017-02-17 RX ADMIN — FENTANYL CITRATE 25 MCG: 50 INJECTION, SOLUTION INTRAMUSCULAR; INTRAVENOUS at 10:02

## 2017-02-17 RX ADMIN — PROPOFOL 20 MG: 10 INJECTION, EMULSION INTRAVENOUS at 10:02

## 2017-02-17 RX ADMIN — LOSARTAN POTASSIUM 100 MG: 25 TABLET, FILM COATED ORAL at 01:02

## 2017-02-17 RX ADMIN — KETAMINE HYDROCHLORIDE 30 MG: 100 INJECTION, SOLUTION, CONCENTRATE INTRAMUSCULAR; INTRAVENOUS at 09:02

## 2017-02-17 RX ADMIN — EPHEDRINE SULFATE 10 MG: 50 INJECTION, SOLUTION INTRAMUSCULAR; INTRAVENOUS; SUBCUTANEOUS at 09:02

## 2017-02-17 RX ADMIN — METOPROLOL SUCCINATE 100 MG: 100 TABLET, FILM COATED, EXTENDED RELEASE ORAL at 06:02

## 2017-02-17 RX ADMIN — SODIUM CHLORIDE, SODIUM GLUCONATE, SODIUM ACETATE, POTASSIUM CHLORIDE, MAGNESIUM CHLORIDE, SODIUM PHOSPHATE, DIBASIC, AND POTASSIUM PHOSPHATE: .53; .5; .37; .037; .03; .012; .00082 INJECTION, SOLUTION INTRAVENOUS at 10:02

## 2017-02-17 RX ADMIN — Medication 1 G: at 09:02

## 2017-02-17 RX ADMIN — LIDOCAINE HYDROCHLORIDE 1 MG: 10 INJECTION, SOLUTION EPIDURAL; INFILTRATION; INTRACAUDAL; PERINEURAL at 07:02

## 2017-02-17 RX ADMIN — ONDANSETRON 4 MG: 2 INJECTION INTRAMUSCULAR; INTRAVENOUS at 10:02

## 2017-02-17 RX ADMIN — FAMOTIDINE 20 MG: 20 TABLET, FILM COATED ORAL at 01:02

## 2017-02-17 RX ADMIN — PREGABALIN 75 MG: 75 CAPSULE ORAL at 07:02

## 2017-02-17 RX ADMIN — SODIUM CHLORIDE: 0.9 INJECTION, SOLUTION INTRAVENOUS at 09:02

## 2017-02-17 RX ADMIN — MUPIROCIN 1 G: 20 OINTMENT TOPICAL at 07:02

## 2017-02-17 RX ADMIN — FENTANYL CITRATE 25 MCG: 50 INJECTION, SOLUTION INTRAMUSCULAR; INTRAVENOUS at 11:02

## 2017-02-17 RX ADMIN — DEXAMETHASONE SODIUM PHOSPHATE 8 MG: 4 INJECTION, SOLUTION INTRAMUSCULAR; INTRAVENOUS at 09:02

## 2017-02-17 RX ADMIN — SODIUM CHLORIDE, SODIUM GLUCONATE, SODIUM ACETATE, POTASSIUM CHLORIDE, MAGNESIUM CHLORIDE, SODIUM PHOSPHATE, DIBASIC, AND POTASSIUM PHOSPHATE: .53; .5; .37; .037; .03; .012; .00082 INJECTION, SOLUTION INTRAVENOUS at 11:02

## 2017-02-17 RX ADMIN — LIDOCAINE HYDROCHLORIDE 60 SYRINGE: 20 INJECTION, SOLUTION INTRAVENOUS at 09:02

## 2017-02-17 RX ADMIN — ASPIRIN 325 MG: 325 TABLET, DELAYED RELEASE ORAL at 01:02

## 2017-02-17 RX ADMIN — CEFAZOLIN SODIUM 2 G: 2 SOLUTION INTRAVENOUS at 06:02

## 2017-02-17 RX ADMIN — PROPOFOL 30 MG: 10 INJECTION, EMULSION INTRAVENOUS at 09:02

## 2017-02-17 RX ADMIN — SODIUM CHLORIDE, SODIUM GLUCONATE, SODIUM ACETATE, POTASSIUM CHLORIDE, MAGNESIUM CHLORIDE, SODIUM PHOSPHATE, DIBASIC, AND POTASSIUM PHOSPHATE: .53; .5; .37; .037; .03; .012; .00082 INJECTION, SOLUTION INTRAVENOUS at 09:02

## 2017-02-17 RX ADMIN — GLYCOPYRROLATE 0.2 MG: 0.2 INJECTION, SOLUTION INTRAMUSCULAR; INTRAVENOUS at 09:02

## 2017-02-17 NOTE — IP AVS SNAPSHOT
[facility]  [address]  [facility] [address]  Phone: [phone]           Patient Discharge Instructions     Our goal is to set you up for success. This packet includes information on your condition, medications, and your home care. It will help you to care for yourself so you don't get sicker and need to go back to the hospital.     Please ask your nurse if you have any questions.        There are many details to remember when preparing to leave the hospital. Here is what you will need to do:    1. Take your medicine. If you are prescribed medications, review your Medication List in the following pages. You may have new medications to  at the pharmacy and others that you'll need to stop taking. Review the instructions for how and when to take your medications. Talk with your doctor or nurses if you are unsure of what to do.     2. Go to your follow-up appointments. Specific follow-up information is listed in the following pages. Your may be contacted by a transition nurse or clinical provider about future appointments. Be sure we have all of the phone numbers to reach you, if needed. Please contact your provider's office if you are unable to make an appointment.     3. Watch for warning signs. Your doctor or nurse will give you detailed warning signs to watch for and when to call for assistance. These instructions may also include educational information about your condition. If you experience any of warning signs to your health, call your doctor.               [facility]  Unless otherwise directed by your provider, please contact [facility], our nurse care line that is available for 24/7 assistance.     [phone] (toll-free)    Registered nurses in the [facility] provide clinical advisement, health education, appointment booking, and other advisory services.                    ** Verify the list of medication(s) below is accurate and up  to date. Carry this with you in case of emergency. If your medications have changed, please notify your healthcare provider.             Medication List      START taking these medications        Additional Info                      aspirin 325 MG tablet   Quantity:  70 tablet   Refills:  0   Dose:  325 mg    Instructions:  Take 1 tablet (325 mg total) by mouth 2 (two) times daily.     Begin Date    AM    Noon    PM    Bedtime       docusate sodium 100 MG capsule   Commonly known as:  COLACE   Quantity:  60 capsule   Refills:  1   Dose:  100 mg    Instructions:  Take 1 capsule (100 mg total) by mouth 2 (two) times daily as needed for Constipation.     Begin Date    AM    Noon    PM    Bedtime       ondansetron 8 MG tablet   Commonly known as:  ZOFRAN   Quantity:  60 tablet   Refills:  0   Dose:  8 mg    Instructions:  Take 1 tablet (8 mg total) by mouth every 12 (twelve) hours as needed for Nausea.     Begin Date    AM    Noon    PM    Bedtime       oxycodone-acetaminophen  mg per tablet   Commonly known as:  PERCOCET   Quantity:  90 tablet   Refills:  0   Dose:  1 tablet    Instructions:  Take 1 tablet by mouth every 4 (four) hours as needed for Pain.     Begin Date    AM    Noon    PM    Bedtime         CONTINUE taking these medications        Additional Info                      allopurinol 100 MG tablet   Commonly known as:  ZYLOPRIM   Quantity:  180 tablet   Refills:  0    Last time this was given:  200 mg on 2/18/2017  9:44 AM   Instructions:  TAKE 2 TABLETS ONE TIME DAILY     Begin Date    AM    Noon    PM    Bedtime       amlodipine 5 MG tablet   Commonly known as:  NORVASC   Quantity:  90 tablet   Refills:  3    Last time this was given:  5 mg on 2/18/2017  9:44 AM   Instructions:  TAKE 1 TABLET ONE TIME DAILY     Begin Date    AM    Noon    PM    Bedtime       colchicine 0.6 mg tablet   Quantity:  60 tablet   Refills:  6   Dose:  0.6 mg   Comments:  Please disregard order sent this morning for  colcrys once daily. Patient should take this bid.    Instructions:  Take 1 tablet (0.6 mg total) by mouth 2 (two) times daily.     Begin Date    AM    Noon    PM    Bedtime       glucosamine-chondroitin 500-400 mg tablet   Refills:  0   Dose:  1 tablet    Instructions:  Take 1 tablet by mouth 3 (three) times daily.     Begin Date    AM    Noon    PM    Bedtime       hydrocortisone 2.5 % cream   Quantity:  1 Tube   Refills:  0    Instructions:  Apply topically 2 (two) times daily.     Begin Date    AM    Noon    PM    Bedtime       latanoprost 0.005 % ophthalmic solution   Commonly known as:  XALATAN   Quantity:  2.5 mL   Refills:  12   Dose:  1 drop    Last time this was given:  1 drop on 2/17/2017  9:32 PM   Instructions:  Place 1 drop into both eyes once daily.     Begin Date    AM    Noon    PM    Bedtime       losartan 100 MG tablet   Commonly known as:  COZAAR   Quantity:  90 tablet   Refills:  3    Last time this was given:  100 mg on 2/18/2017  9:47 AM   Instructions:  TAKE 1 TABLET ONE TIME DAILY     Begin Date    AM    Noon    PM    Bedtime       MEN 50 PLUS MULTIVITAMIN ORAL   Refills:  0    Instructions:  Take by mouth.     Begin Date    AM    Noon    PM    Bedtime       metoprolol succinate 100 MG 24 hr tablet   Commonly known as:  TOPROL-XL   Quantity:  90 tablet   Refills:  3    Last time this was given:  100 mg on 2/18/2017  9:43 AM   Instructions:  TAKE 1 TABLET ONE TIME DAILY  (NEED  APPOINTMENT)     Begin Date    AM    Noon    PM    Bedtime       naproxen sodium 220 MG tablet   Commonly known as:  ANAPROX   Refills:  0   Dose:  220 mg    Instructions:  Take 220 mg by mouth 2 (two) times daily with meals.     Begin Date    AM    Noon    PM    Bedtime       triamcinolone acetonide 0.1% 0.1 % cream   Commonly known as:  KENALOG   Refills:  0    Instructions:  Apply topically 2 (two) times daily.     Begin Date    AM    Noon    PM    Bedtime            Where to Get Your Medications      You can get  "these medications from any pharmacy     Bring a paper prescription for each of these medications     aspirin 325 MG tablet    docusate sodium 100 MG capsule    ondansetron 8 MG tablet    oxycodone-acetaminophen  mg per tablet                  Please bring to all follow up appointments:    1. A copy of your discharge instructions.  2. All medicines you are currently taking in their original bottles.  3. Identification and insurance card.    Please arrive 15 minutes ahead of scheduled appointment time.    Please call 24 hours in advance if you must reschedule your appointment and/or time.        Your Scheduled Appointments     Mar 03, 2017 11:15 AM CST   [facility] with EMEL[facility] Fuentes - Orthoped[facility] ([address] )    [address]  [facility] [address] [phone]                Discharge Instructions     Future Orders    Activity as tolerated     Call MD for:  difficulty breathing or increased cough     Call MD for:  increased confusion or weakness     Call MD for:  persistent dizziness, light-headedness, or visual disturbances     Call MD for:  persistent nausea and vomiting or diarrhea     Call MD for:  redness, tenderness, or signs of infection (pain, swelling, redness, odor or green/yellow discharge around incision site)     Call MD for:  severe persistent headache     Call MD for:  severe uncontrolled pain     Call MD for:  temperature >100.4     Call MD for:  worsening rash     COMMODE FOR HOME USE     Questions:    Type:  Standard    Height:  5' 11" (1.803 m)    Weight:  94.8 kg (209 lb)    Does patient have medical equipment at home?:      Length of need (1-99 months):  99    Diet general     Questions:    Total calories:      Fat restriction, if any:      Protein restriction, if any:      Na restriction, if any:      Fluid restriction:      Additional restrictions:      Leave dressing on - Keep it clean, dry, and intact until clinic visit     No dressing needed     " "TRANSFER TUB BENCH FOR HOME USE     Questions:    Type of Transfer Tub Bench:  Padded    Height:  5' 11" (1.803 m)    Weight:  94.8 kg (209 lb)    Does patient have medical equipment at home?:      Length of need (1-99 months):  99    WALKER FOR HOME USE     Questions:    Type of Walker:  Adult (5'4"-6'6")    With wheels?:  No    Height:  5' 11" (1.803 m)    Weight:  94.8 kg (209 lb)    Length of need (1-99 months):  99    Platform attachment:      Accessories/Other:      Assistance needed:      Does patient have medical equipment at home?:      WALKER FOR HOME USE     Questions:    Type of Walker:  Adult (5'4"-6'6")    With wheels?:  No    Height:  5' 11" (1.803 m)    Weight:  94.8 kg (209 lb)    Length of need (1-99 months):  99    Platform attachment:      Accessories/Other:      Assistance needed:      Does patient have medical equipment at home?:      Vendor:  Other (use comments) Comment - Duplicate    Expected Date of Delivery:  2/17/2017    Expected Time of Delivery:      WALKER FOR HOME USE     Questions:    Type of Walker:  Adult (5'4"-6'6")    With wheels?:  Yes    Height:  5' 11" (1.803 m)    Weight:  94.8 kg (209 lb)    Length of need (1-99 months):  99    Platform attachment:      Accessories/Other:      Assistance needed:      Does patient have medical equipment at home?:  shower chair    grab bar    Please check all that apply:  Patient's condition impairs ambulation.        Discharge Instructions       Home health set up with [facility], [phone], they will be out tomorrow      Primary Diagnosis     Your primary diagnosis was:  Primary Osteoarthritis Of Both Knees      Admission Information     Date & Time Provider Department CSN    2/17/2017  6:07 AM Wagner Ayala MD [facility] [id number]      Care Providers     Provider Role Specialty Primary office phone    Wagner Ayala MD Attending Provider Orthopedic Surgery [phone]    Wagner Ayala MD Surgeon " " Orthopedic Surgery [phone]      Your Vitals Were     BP Pulse Temp Resp Height Weight    132/65 (BP Location: Right arm, Patient Position: Lying, BP Method: Automatic) 69 98.3 °F (36.8 °C) (Oral) 18 5' 11" (1.803 m) 94.8 kg (209 lb)    SpO2 BMI             96% 29.15 kg/m2         Recent Lab Values        2/17/2006 3/19/2007 10/20/2009 5/27/2010 3/30/2013 9/21/2015 1/11/2017         8:10 AM  7:40 AM  7:08 AM  7:27 AM  8:10 AM  7:13 AM  8:53 AM     A1C 5.7 5.8 6.0 5.8 6.0 5.7 5.6     Comment for A1C at  8:53 AM on 1/11/2017:  According to ADA guidelines, hemoglobin A1C <7.0% represents  optimal control in non-pregnant diabetic patients.  Different  metrics may apply to specific populations.   Standards of Medical Care in Diabetes - 2016.  For the purpose of screening for the presence of diabetes:  <5.7%     Consistent with the absence of diabetes  5.7-6.4%  Consistent with increasing risk for diabetes   (prediabetes)  >or=6.5%  Consistent with diabetes  Currently no consensus exists for use of hemoglobin A1C  for diagnosis of diabetes for children.        Pending Labs     Order Current Status    Specimen to Pathology - Surgery In process      Allergies as of 2/18/2017        Reactions    Ketoconazole Rash    Topical cream years ago used      Advance Directives     An advance directive is a document which, in the event you are no longer able to make decisions for yourself, tells your healthcare team what kind of treatment you do or do not want to receive, or who you would like to make those decisions for you.  If you do not currently have an advance directive, [facility] encourages you to create one.  For more information call:  [phone]), [phone] ([phone]),  or log on to [email or web address].        Language Assistance Services     ATTENTION: Language assistance services are available, free of charge. Please call [phone].      ATENCIÓN: Si habla español, tiene a naik disposición " servicios gratuitos de asistencia lingüística. Nay gonzalez [phone].     [facility] Ý: N?u b?n nói Ti?ng Vi?t, có các d?ch v? h? tr? ngôn ng? mi?n phí dành cho b?n. G?i s? [phone].         [facility] complies with applicable Federal civil rights laws and does not discriminate on the basis of race, color, national origin, age, disability, or sex.

## 2017-02-17 NOTE — PLAN OF CARE
[facility]    HOME HEALTH ORDERS  FACE TO FACE ENCOUNTER    Patient Name: Ej Miller  YOB: 1940    PCP: Floyd Moon MD   PCP Address: [address] / [place] [address]  PCP Phone Number: [phone]  PCP Fax: [phone]    Encounter Date: 02/17/2017    Admit to Home Health    Diagnoses:  Active Hospital Problems    Diagnosis  POA    *Primary osteoarthritis of both knees [M17.0]  Yes      Resolved Hospital Problems    Diagnosis Date Resolved POA   No resolved problems to display.       Future Appointments  Date Time Provider Department Center   3/3/2017 11:15 AM Kina Robles PA-C [facility]     Follow-up Information     Follow up with Wagner Ayala MD In 2 weeks.    Specialty:  Orthopedic Surgery    Contact information:    [address]  [place] [address]  [phone]              I have seen and examined this patient face to face today. My clinical findings that support the need for the home health skilled services and home bound status are the following:  Weakness/numbness causing balance and gait disturbance due to Joint Replacement making it taxing to leave home.    Allergies:  Review of patient's allergies indicates:   Allergen Reactions    Ketoconazole Rash     Topical cream years ago used       Diet: regular diet    Activities: activity as tolerated    Home Health Admitting Clinician:   SN/PT to complete comprehensive assessment including routine vital signs. Instruct on disease process and s/s of complications to report to MD. Follow specific home health arthoplasty protocol. Review/verify medication list sent home with the patient at time of discharge  and instruct patient/caregiver as needed. If coumadin ordered, coumadin clinic to manage INR with INR draws 2x per week with a goal to maintain INR between 1.8 and 2.2. Frequency may be adjusted depending on start of care date.    Notify MD if SBP > 160 or < 90; DBP >  90 or < 50; HR > 120 or < 50; Temp > 101    Home Medical Equipment:  Walker, 3-1 bedside commode, transfer tub bench    CONSULTS:    Physical Therapy may admit if patient not on coumadin, PT to perform comprehensive assessment if performing admit visit and generate therapy plan of care. Evaluate for home safety and equipment needs; Establish/upgrade home exercise program. Perform/instruct on therapeutic exercises, gait training, transfer training, and Range of Motion.      OTHER: (only select if patient needs other therapy disciplines)      MISCELLANEOUS CARE:      WOUND CARE ORDERS:  Assess Surgical Incision/DSRG each TX  Aquacel AG drsg applied post-op leave on 14 days post op. Call MD if any drainage reaches border to border of drsg horizontally, s/s of infection, temp >101, induration, swelling or redness.  If dressing is removed per MD order, then apply island dressing, change/teach caregiver to perform daily dressing change if island dressing present.    Medications: Review discharge medications with patient and family and provide education.      Current Discharge Medication List      START taking these medications    Details   aspirin 325 MG tablet Take 1 tablet (325 mg total) by mouth 2 (two) times daily.  Qty: 70 tablet, Refills: 0      docusate sodium (COLACE) 100 MG capsule Take 1 capsule (100 mg total) by mouth 2 (two) times daily as needed for Constipation.  Qty: 60 capsule, Refills: 1      ondansetron (ZOFRAN) 8 MG tablet Take 1 tablet (8 mg total) by mouth every 12 (twelve) hours as needed for Nausea.  Qty: 60 tablet, Refills: 0      oxycodone-acetaminophen (PERCOCET)  mg per tablet Take 1 tablet by mouth every 4 (four) hours as needed for Pain.  Qty: 90 tablet, Refills: 0         CONTINUE these medications which have NOT CHANGED    Details   allopurinol (ZYLOPRIM) 100 MG tablet TAKE 2 TABLETS ONE TIME DAILY  Qty: 180 tablet, Refills: 0    Associated Diagnoses: Gouty arthritis      amlodipine  (NORVASC) 5 MG tablet TAKE 1 TABLET ONE TIME DAILY  Qty: 90 tablet, Refills: 3    Associated Diagnoses: Essential hypertension      latanoprost (XALATAN) 0.005 % ophthalmic solution Place 1 drop into both eyes once daily.  Qty: 2.5 mL, Refills: 12    Associated Diagnoses: Primary open angle glaucoma, right eye, moderate stage      losartan (COZAAR) 100 MG tablet TAKE 1 TABLET ONE TIME DAILY  Qty: 90 tablet, Refills: 3    Associated Diagnoses: Essential hypertension      metoprolol succinate (TOPROL-XL) 100 MG 24 hr tablet TAKE 1 TABLET ONE TIME DAILY  (NEED  APPOINTMENT)  Qty: 90 tablet, Refills: 3    Associated Diagnoses: Essential hypertension      colchicine 0.6 mg tablet Take 1 tablet (0.6 mg total) by mouth 2 (two) times daily.  Qty: 60 tablet, Refills: 6    Comments: Please disregard order sent this morning for colcrys once daily. Patient should take this bid.  Associated Diagnoses: Gouty arthritis      glucosamine-chondroitin 500-400 mg tablet Take 1 tablet by mouth 3 (three) times daily.      hydrocortisone 2.5 % cream Apply topically 2 (two) times daily.  Qty: 1 Tube, Refills: 0    Associated Diagnoses: Rash      MULTIVIT-MIN/FA/LYCOPEN/LUTEIN (MEN 50 PLUS MULTIVITAMIN ORAL) Take by mouth.      naproxen sodium (ANAPROX) 220 MG tablet Take 220 mg by mouth 2 (two) times daily with meals.      triamcinolone acetonide 0.1% (KENALOG) 0.1 % cream Apply topically 2 (two) times daily.             I certify that this patient is confined to his home and needs intermittent skilled nursing care and physical therapy.

## 2017-02-17 NOTE — ANESTHESIA RELEASE NOTE
"Anesthesia Release from PACU Note    Patient: Ej Miller    Procedure(s) Performed: Procedure(s) (LRB):  REPLACEMENT-KNEE-TOTAL jose armando (Right)    Anesthesia type: general    Post pain: Adequate analgesia    Post assessment: no apparent anesthetic complications    Last Vitals:   Visit Vitals    BP (!) 152/80    Pulse 72    Temp 36.4 °C (97.6 °F) (Oral)    Resp 15    Ht 5' 11" (1.803 m)    Wt 94.8 kg (209 lb)    SpO2 96%    BMI 29.15 kg/m2       Post vital signs: stable    Level of consciousness: awake and alert     Nausea/Vomiting: no nausea/no vomiting    Complications: none    Airway Patency: patent    Respiratory: unassisted, spontaneous ventilation, room air    Cardiovascular: stable and blood pressure at baseline    Hydration: euvolemic  "

## 2017-02-17 NOTE — TRANSFER OF CARE
"Anesthesia Transfer of Care Note    Patient: Ej Miller    Procedure(s) Performed: Procedure(s) (LRB):  REPLACEMENT-KNEE-TOTAL jose armando (Right)    Patient location: PACU    Anesthesia Type: general    Transport from OR: Transported from OR on 6-10 L/min O2 by face mask with adequate spontaneous ventilation    Post pain: adequate analgesia    Post assessment: no apparent anesthetic complications and tolerated procedure well    Post vital signs: stable    Level of consciousness: sedated    Nausea/Vomiting: no nausea/vomiting    Complications: none          Last vitals:   Visit Vitals    /72 (BP Location: Right arm, Patient Position: Lying, BP Method: Automatic)    Pulse 66    Temp 36.8 °C (98.2 °F) (Oral)    Resp 16    Ht 5' 11" (1.803 m)    Wt 94.8 kg (209 lb)    SpO2 98%    BMI 29.15 kg/m2     "

## 2017-02-17 NOTE — PT/OT/SLP EVAL
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     Gout attack     Gout, joint     HTN (hypertension)     Hyperglycemia     Osteoarthritis of knee      bilaterial    Peptic ulcer       Past Surgical History   Procedure Laterality Date    Appendectomy      Tonsillectomy, adenoidectomy         Referring physician: Jamie  Date referred to PT: 2/17/2017      General Precautions: Standard, fall  Orthopedic Precautions: RLE weight bearing as tolerated   Braces: N/A       Do you have any cultural, spiritual, Worship conflicts, given your current situation?: none    Patient History:  Lives With: spouse  Living Arrangements: house  Home Accessibility: stairs to enter home, stairs within home  Home Layout: Able to live on 1st floor  Number of Stairs to Enter Home: 1  Number of Stairs Within Home: 10  Stair Railings at Home: inside, present on right side, inside, present at both sides  Transportation Available: family or friend will provide  Living Environment Comment: Pt will have 24/7 asst at home from wife and 3 sons. Pt can live on first floor and has a walk-in shower with grab bars.  Equipment Currently Used at Home: shower chair, grab bar  DME owned (not currently used):     Previous Level of Function:  Ambulation Skills: independent  Transfer Skills: independent  ADL Skills: independent  Work/Leisure Activity: independent    Subjective:  Communicated with nsg prior to session. Pt agreeable to PT session    Chief Complaint: impaired functional mobility with (R) LE weakness  Patient goals: return home to  PLOF    Pain Ratin/10   Location - Side: Right  Location - Orientation: anterior  Location: knee  Pain Addressed: Pre-medicate for activity, Cessation of Activity  Pain Rating Post-Intervention: 5/10    Objective:   Patient found with: blood pressure cuff, FCD, perineural catheter, PCEA, peripheral IV, Polar ice, pulse ox (continuous), telemetry     Cognitive Exam:  Oriented to: Person, Place, Time and Situation    Follows Commands/attention: Follows multistep  commands  Communication: clear/fluent  Safety awareness/insight to disability: intact    Physical Exam:  Postural examination/scapula alignment: No postural abnormalities identified    Skin integrity: Visible skin intact  Edema: None noted     Sensation:   Intact  light/touch (B) LE    Lower Extremity Range of Motion:  Right Lower Extremity: WFL except limitations into flex/ext from surgical dressing  Left Lower Extremity: WNL    Lower Extremity Strength:  Right Lower Extremity: Deficits: 3+/5 grossly  Left Lower Extremity: WNL       Functional Mobility:  Bed Mobility:  Scooting/Bridging: Stand by Assistance  Supine to Sit: Stand by Assistance  Sit to Supine: Stand by Assistance    Transfers:  Sit <> Stand Assistance: Contact Guard Assistance  Sit <> Stand Assistive Device: Standard Walker    Gait:   Gait Distance: 6 steps fwd/back; 3 lateral steps  Assistance 1: Contact Guard Assistance  Gait Assistive Device: Standard walker  Gait Pattern: 3-point gait  Gait Deviation(s): decreased madeline, increased time in double stance, decreased velocity of limb motion, decreased step length, decreased stride length, decreased toe-to-floor clearance, decreased weight-shifting ability, foot flat      Balance:   Static Sit: FAIR+: Able to take MINIMAL challenges from all directions  Dynamic Sit: FAIR+: Maintains balance through MINIMAL excursions of active trunk motion  Static Stand: FAIR+: Takes MINIMAL challenges from all directions  Dynamic stand: FAIR: Needs  CONTACT GUARD during gait    Therapeutic Activities and Exercises:  -Pt educated on:  A. PT POC and role of PT  B. Importance of OOB activity to improve functional outcomes  C. DME mgmt    AM-PAC 6 CLICK MOBILITY  How much help from another person does this patient currently need?   1 = Unable, Total/Dependent Assistance  2 = A lot, Maximum/Moderate Assistance  3 = A little, Minimum/Contact Guard/Supervision  4 = None, Modified [place]/Independent    Turning over in bed (including adjusting bedclothes, sheets and blankets)?: 4  Sitting down on and standing up from a chair with arms (e.g., wheelchair, bedside commode, etc.): 3  Moving from lying on back to sitting on the side of the bed?: 4  Moving to and from a bed to a chair (including a wheelchair)?: 3  Need to walk in hospital room?: 3  Climbing 3-5 steps with a railing?: 3  Total Score: 20     AM-PAC Raw Score CMS G-Code Modifier Level of Impairment Assistance   6 % Total / Unable   7 - 9 CM 80 - 100% Maximal Assist   10 - 14 CL 60 - 80% Moderate Assist   15 - 19 CK 40 - 60% Moderate Assist   20 - 22 CJ 20 - 40% Minimal Assist   23 CI 1-20% SBA / CGA   24 CH 0% Independent/ Mod I     Patient left supine with all lines intact, call button in reach and nsg notified.    Assessment:   Ej Miller is a 76 y.o. male with a medical diagnosis of Primary osteoarthritis of both knees and presents with impaired functional mobility and (R) LE weakness. Pt tolerated evaluation well this afternoon. Pt reported moderate pain that remained the same at end of session. Pt with no LOB during ambulation and transfers. Pt will cont to benefit from skilled therapy services and is appropriate for HHPT upon d/c. Pt safe to t/f with nsg assistance x1 person using SW (CGA).    Rehab identified problem list/impairments: Rehab identified problem list/impairments: weakness, impaired endurance, impaired self care skills, impaired functional mobilty, gait instability,  impaired balance, pain, decreased lower extremity function, decreased ROM, impaired joint extensibility, decreased safety awareness, impaired muscle length, orthopedic precautions, impaired skin    Rehab potential is good.    Activity tolerance: Good    Discharge recommendations: Discharge Facility/Level Of Care Needs: home health PT     Barriers to discharge: Barriers to Discharge: None    Equipment recommendations: Equipment Needed After Discharge: bedside commode, walker, rolling     GOALS:   Physical Therapy Goals        Problem: Physical Therapy Goal    Goal Priority Disciplines Outcome Goal Variances Interventions   Physical Therapy Goal     PT/OT, PT Ongoing (interventions implemented as appropriate)     Description:  Goals to be met by: 17     Patient will increase functional independence with mobility by performin. Supine to sit with Set-up [place]  2. Sit to supine with Set-up [place]  3. Sit to stand transfer with Supervision  4. Bed to chair transfer with Stand-by Assistance using Rolling Walker  5. Gait  x 150 feet with Stand-by Assistance using Rolling Walker.   6. Ascend/descend 4 stair steps with right Handrails Contact Guard Assistance.   7. Ascend/Descend 7 inch curb step with Contact Guard Assistance using Rolling Walker.  8. Lower extremity exercise program x20-30 reps per handout, with independence                PLAN:    Patient to be seen BID to address the above listed problems via gait training, therapeutic activities, therapeutic exercises, neuromuscular re-education  Plan of Care expires: 17  Plan of Care reviewed with: patient          Frank Azul, PT  2017

## 2017-02-17 NOTE — PLAN OF CARE
Problem: Physical Therapy Goal  Goal: Physical Therapy Goal  Goals to be met by: 17     Patient will increase functional independence with mobility by performin. Supine to sit with Set-up [place]  2. Sit to supine with Set-up [place]  3. Sit to stand transfer with Supervision  4. Bed to chair transfer with Stand-by Assistance using Rolling Walker  5. Gait x 150 feet with Stand-by Assistance using Rolling Walker.   6. Ascend/descend 4 stair steps with right Handrails Contact Guard Assistance.   7. Ascend/Descend 7 inch curb step with Contact Guard Assistance using Rolling Walker.  8. Lower extremity exercise program x20-30 reps per handout, with independence  Outcome: Ongoing (interventions implemented as appropriate)  Pt tolerated evaluation well this afternoon. Pt reported moderate pain that remained the same at end of session. Pt with no LOB during ambulation and transfers. Pt will cont to benefit from skilled therapy services and is appropriate for HHPT upon d/c. Pt safe to t/f with nsg assistance x1 person using SW (CGA).

## 2017-02-17 NOTE — NURSING TRANSFER
Nursing Transfer Note      2/17/2017     Transfer 501    Transfer via stretcher  Transfer with IV pole, Polar ice, IS    Transported by  tech    Medicines sent: na    Chart send with patient: yes    Notified: George    Patient reassessed at: 2/17 @ 4386

## 2017-02-17 NOTE — ANESTHESIA POSTPROCEDURE EVALUATION
"Anesthesia Post Evaluation    Patient: Ej Miller    Procedure(s) Performed: Procedure(s) (LRB):  REPLACEMENT-KNEE-TOTAL jose armando (Right)    Final Anesthesia Type: general  Patient location during evaluation: PACU  Patient participation: Yes- Able to Participate  Level of consciousness: awake and alert  Post-procedure vital signs: reviewed and stable  Pain management: adequate  Airway patency: patent  PONV status at discharge: No PONV  Anesthetic complications: no      Cardiovascular status: blood pressure returned to baseline and hemodynamically stable  Respiratory status: spontaneous ventilation, unassisted and room air  Hydration status: euvolemic  Follow-up not needed.        Visit Vitals    BP (!) 152/80    Pulse 72    Temp 36.4 °C (97.6 °F) (Oral)    Resp 15    Ht 5' 11" (1.803 m)    Wt 94.8 kg (209 lb)    SpO2 96%    BMI 29.15 kg/m2       Pain/Nory Score: Pain Assessment Performed: Yes (2/17/2017  2:00 PM)  Presence of Pain: denies (2/17/2017  2:00 PM)  Pain Rating Prior to Med Admin: 5 (2/17/2017 11:53 AM)  Nory Score: 10 (2/17/2017  9:08 AM)      "

## 2017-02-17 NOTE — ANESTHESIA PROCEDURE NOTES
Spinal    Diagnosis: right knee pain  Patient location during procedure: OR  Start time: 2/17/2017 9:24 AM  Timeout: 2/17/2017 9:23 AM  End time: 2/17/2017 9:28 AM  Staffing  Anesthesiologist: SIMONA ASENCIO  Performed by: anesthesiologist   Preanesthetic Checklist  Completed: patient identified, site marked, surgical consent, pre-op evaluation, timeout performed, IV checked, risks and benefits discussed and monitors and equipment checked  Spinal Block  Patient position: sitting  Prep: ChloraPrep  Patient monitoring: heart rate, cardiac monitor and continuous pulse ox  Approach: midline  Location: L3-4  Injection technique: single shot  CSF Fluid: clear free-flowing CSF  Needle  Needle type: pencil-tip   Needle gauge: 25 G  Needle length: 3.5 in  Additional Documentation: incremental injection, negative aspiration for heme and no paresthesia on injection  Needle localization: anatomical landmarks  Assessment  Sensory level: T6   Dermatomal levels determined by alcohol wipe  Ease of block: easy  Patient's tolerance of the procedure: comfortable throughout block  Medications:  Bolus administered: 3 mL of 1.5 mepivacaine

## 2017-02-17 NOTE — ANESTHESIA PROCEDURE NOTES
R IPACK    Patient location during procedure: pre-op   Block not for primary anesthetic.  Reason for block: at surgeon's request and post-op pain management   Post-op Pain Location: R Post Knee Pain  Start time: 2/17/2017 7:58 AM  Timeout: 2/17/2017 7:57 AM   End time: 2/17/2017 8:03 AM  Staffing  Anesthesiologist: QUINCY KEARNS  Resident/CRNA: LUIS BARCLAY  Performed by: resident/CRNA   Preanesthetic Checklist  Completed: patient identified, site marked, surgical consent, pre-op evaluation, timeout performed, IV checked, risks and benefits discussed and monitors and equipment checked  Peripheral Block  Patient position: supine  Prep: ChloraPrep  Patient monitoring: heart rate, cardiac monitor, continuous pulse ox, continuous capnometry and frequent blood pressure checks  Block type: I PACK  Laterality: right  Injection technique: single shot  Needle  Needle type: Stimuplex   Needle gauge: 21 G  Needle length: 4 in  Needle localization: anatomical landmarks and ultrasound guidance   -ultrasound image captured on disc.  Assessment  Injection assessment: negative aspiration, negative parasthesia and local visualized surrounding nerve  Paresthesia pain: none  Heart rate change: no  Slow fractionated injection: yes  Medications:  Bolus administered: 20 mL of 0.25 ropivacaine  Epinephrine added: 3.75 mcg/mL (1/300,000)  Additional Notes  VSS.  [facility] RN monitoring vitals throughout procedure.  Patient tolerated procedure well.

## 2017-02-17 NOTE — PLAN OF CARE
Sterile field maintained throughout procedure. Pt positioned appropriately & secured; safety monitored throughout procedure. See anesthesia records for V/S, I&O, etc.

## 2017-02-17 NOTE — PROGRESS NOTES
IV room notified that Vancomycin 1.5 gm not received, even though request to send to [facility] was made when orders were released and a previous phone call to pharmacy at 0750 was made by Lilia Collins RN to send medication not received. Vancomycin arrived to [facility] at 0828 and hung at 0830.

## 2017-02-17 NOTE — PT/OT/SLP EVAL
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     Gout attack     Gout, joint     HTN (hypertension)     Hyperglycemia     Osteoarthritis of knee      bilaterial    Peptic ulcer       Past Surgical History   Procedure Laterality Date    Appendectomy      Tonsillectomy, adenoidectomy             General Precautions: Standard, fall  Orthopedic Precautions: RLE weight bearing as tolerated  Braces:      Do you have any cultural, spiritual, Caodaism conflicts, given your current situation?: None     Patient History:  Living Environment  Lives With: spouse  Living Arrangements: house (2SH)  Home Accessibility: stairs to enter home, stairs within home  Home Layout: Able to live on 1st floor  Number of Stairs to Enter Home: 1  Number of Stairs Within Home: 10  Stair Railings at Home: none (Outside: None  ; Inside: Present on R side going up first five; present on both sides for last five steps)  Transportation Available: family or friend will provide  Living Environment Comment: Pt lives with wife in a 2SH with 1 LETICIA. Pt reports he will not need to access 2nd floor. Pt has access to walkin shower with shower chair and grab bars. He reports he will have asssitance from wife and 3 sons who live close by. PTA, pt reprts being (I) in all ADL and functional mobiliy.    Equipment Currently Used at Home: shower chair    Prior level of function:   Bed Mobility/Transfers: independent  Grooming: independent  Bathing: independent  Upper Body Dressing: independent  Lower Body Dressing: independent  Toileting:  independent  Home Management Skills: independent  Homemaking Responsibilities: No       Subjective:  Communicated with RN prior to session.  Chief Complaint: No complaints  Patient/Family stated goals: Return to PLOF.     Pt agreeable to evaluation this date.      Pain Ratin/10  Location - Side: Right  Location - Orientation: anterior  Location: knee  Pain Addressed: Pre-medicate for activity, Cessation of Activity  Pain Rating Post-Intervention: 5/10    Objective:  Patient found with: blood pressure cuff, FCD, perineural catheter, PCEA, peripheral IV, Polar ice, pulse ox (continuous), telemetry    Cognitive Exam:  Oriented to: Person, Place, Time and Situation  Follows Commands/attention: Follows multistep  commands  Communication: clear/fluent  Memory:  No Deficits noted  Safety awareness/insight to disability: intact  Coping skills/emotional control: Appropriate to situation    Visual/perceptual:  Intact    Physical Exam:  Postural examination/scapula alignment: No postural abnormalities identified  Skin integrity: Visible skin intact  Edema: None noted     Sensation:   Intact    Upper Extremity Range of Motion:  Right Upper Extremity: WFL decreased ROM- AROM: 105*   Left Upper Extremity: WFL    Upper Extremity Strength:  Right Upper Extremity: WFL 4/5  Left Upper Extremity: WFL  4+/5   Strength: WFL        Gross motor coordination: WFL    Functional Mobility:  Bed Mobility:  Supine to Sit: Contact Guard Assistance  Sit to Supine: Stand by Assistance    Transfers:  Sit <> Stand Assistance: Contact Guard Assistance  Sit <> Stand Assistive Device: Standard Walker    Functional Ambulation: Pt took several steps forwards, backwards, and sideways utilizing SW and CGA. Pt tolerated functional mobility well this date.     Activities of Daily Living:       UE Dressing Level of Assistance: Maximum assistance (to [facility] secondary to multiple lines)         Balance:   Static Sit: GOOD: Takes MODERATE  "challenges from all directions  Dynamic Sit: GOOD-: Maintains balance through MODERATE excursions of active trunk movement,     Static Stand: FAIR: Maintains without assist but unable to take challenges  Dynamic stand: FAIR: Needs CONTACT GUARD during gait    Therapeutic Activities and Exercises:  Pt educated on OT role and OT POC.     AM-PAC 6 CLICK ADL  How much help from another person does this patient currently need?  1 = Unable, Total/Dependent Assistance  2 = A lot, Maximum/Moderate Assistance  3 = A little, Minimum/Contact Guard/Supervision  4 = None, Modified [place]/Independent    Putting on and taking off regular lower body clothing? : 2  Bathing (including washing, rinsing, drying)?: 2  Toileting, which includes using toilet, bedpan, or urinal? : 2  Putting on and taking off regular upper body clothing?: 2  Taking care of personal grooming such as brushing teeth?: 3  Eating meals?: 3  Total Score: 14    AM-PAC Raw Score CMS "G-Code Modifier Level of Impairment Assistance   6 % Total / Unable   7 - 9 CM 80 - 100% Maximal Assist   10 - 14 CL 60 - 80% Moderate Assist   15 - 19 CK 40 - 60% Moderate Assist   20 - 22 CJ 20 - 40% Minimal Assist   23 CI 1-20% SBA / CGA   24 CH 0% Independent/ Mod I       Patient left supine with all lines intact and RN notified    Assessment:  Ej Miller is a 76 y.o. male with a medical diagnosis of Primary osteoarthritis of both knees and presents with decreased strength, endurance, self care skills and overall functional mobility. Pt participated in evaluation this date with good performance. He completed bed mobility with SBA-CGA and sit <> stand with CGA. He took multiple steps utilizing RW and CGA with good tolerance. Pts BUE are within functional limits however decreased ROM and strength in RUE. Pt would benefit from continued skilled OT to address goals and maximize return to PLOF. OT is recommending home health in order to continue pts progress and " increase independence in his home setting.     Rehab identified problem list/impairments: Rehab identified problem list/impairments: weakness, impaired endurance, impaired self care skills, impaired functional mobilty, pain, orthopedic precautions    Rehab potential is good.    Activity tolerance: Good    Discharge recommendations: Discharge Facility/Level Of Care Needs: home health OT     Barriers to discharge: Barriers to Discharge: None    Equipment recommendations: bedside commode, walker, rolling     GOALS:   Occupational Therapy Goals        Problem: Occupational Therapy Goal    Goal Priority Disciplines Outcome Interventions   Occupational Therapy Goal     OT, PT/OT     Description:  Goals to be met by: 02/24/17    Patient will increase functional independence with ADLs by performing:    UE Dressing with Modified [place].  LE Dressing with Supervision.  Grooming while standing at sink with Supervision.  Toileting from bedside commode with Supervision for hygiene and clothing management.   Bathing from  edge of bed with Supervision.  Supine to sit with Modified [place].  Toilet transfer to bedside commode with Supervision.  Increased functional strength to 5/5 for BUE.  Upper extremity exercise program x15 reps per handout, with independence.                PLAN:  Patient to be seen daily to address the above listed problems via self-care/home management, therapeutic activities, therapeutic exercises  Plan of Care expires: 02/24/17  Plan of Care reviewed with: patient         HOLLY Hackett  02/17/2017

## 2017-02-17 NOTE — PLAN OF CARE
Problem: Occupational Therapy Goal  Goal: Occupational Therapy Goal  Goals to be met by: 02/24/17    Patient will increase functional independence with ADLs by performing:    UE Dressing with Modified [place].  LE Dressing with Supervision.  Grooming while standing at sink with Supervision.  Toileting from bedside commode with Supervision for hygiene and clothing management.   Bathing from edge of bed with Supervision.  Supine to sit with Modified [place].  Toilet transfer to bedside commode with Supervision.  Increased functional strength to 5/5 for BUE.  Upper extremity exercise program x15 reps per handout, with independence.  Initiate POC.

## 2017-02-17 NOTE — ANESTHESIA PROCEDURE NOTES
R Adductor Cath    Patient location during procedure: pre-op   Block not for primary anesthetic.  Reason for block: at surgeon's request and post-op pain management   Post-op Pain Location: R Knee Pain  Start time: 2/17/2017 7:48 AM  Timeout: 2/17/2017 7:46 AM   End time: 2/17/2017 7:56 AM  Staffing  Anesthesiologist: QUINCY KEARNS  Resident/CRNA: LUIS BARCLAY  Performed by: resident/CRNA   Preanesthetic Checklist  Completed: patient identified, site marked, surgical consent, pre-op evaluation, timeout performed, IV checked, risks and benefits discussed and monitors and equipment checked  Peripheral Block  Patient position: supine  Prep: ChloraPrep and site prepped and draped  Patient monitoring: heart rate, cardiac monitor, continuous pulse ox, continuous capnometry and frequent blood pressure checks  Block type: adductor canal  Laterality: right  Injection technique: continuous  Needle  Needle type: Tuohy   Needle gauge: 17 G  Needle length: 3.5 in  Needle localization: anatomical landmarks and ultrasound guidance  Catheter type: spring wound  Catheter size: 19 G  Test dose: lidocaine 1.5% with Epi 1-to-200,000 and negative   -ultrasound image captured on disc.  Assessment  Injection assessment: negative aspiration, negative parasthesia and local visualized surrounding nerve  Paresthesia pain: none  Heart rate change: no  Slow fractionated injection: yes  Medications:  Bolus administered: 20 mL of 0.25 ropivacaine  Epinephrine added: 3.75 mcg/mL (1/300,000)  Additional Notes  VSS.  [facility] RN monitoring vitals throughout procedure.  Patient tolerated procedure well.

## 2017-02-18 VITALS
HEART RATE: 69 BPM | RESPIRATION RATE: 18 BRPM | SYSTOLIC BLOOD PRESSURE: 132 MMHG | OXYGEN SATURATION: 96 % | BODY MASS INDEX: 29.26 KG/M2 | WEIGHT: 209 LBS | TEMPERATURE: 98 F | DIASTOLIC BLOOD PRESSURE: 65 MMHG | HEIGHT: 71 IN

## 2017-02-18 PROCEDURE — 97530 THERAPEUTIC ACTIVITIES: CPT

## 2017-02-18 PROCEDURE — 97110 THERAPEUTIC EXERCISES: CPT

## 2017-02-18 PROCEDURE — 25000003 PHARM REV CODE 250: Performed by: PHYSICIAN ASSISTANT

## 2017-02-18 PROCEDURE — 63600175 PHARM REV CODE 636 W HCPCS: Performed by: PHYSICIAN ASSISTANT

## 2017-02-18 PROCEDURE — 97116 GAIT TRAINING THERAPY: CPT

## 2017-02-18 PROCEDURE — 97535 SELF CARE MNGMENT TRAINING: CPT

## 2017-02-18 PROCEDURE — 25000003 PHARM REV CODE 250: Performed by: STUDENT IN AN ORGANIZED HEALTH CARE EDUCATION/TRAINING PROGRAM

## 2017-02-18 RX ADMIN — CELECOXIB 200 MG: 200 CAPSULE ORAL at 09:02

## 2017-02-18 RX ADMIN — PREGABALIN 75 MG: 75 CAPSULE ORAL at 09:02

## 2017-02-18 RX ADMIN — METOPROLOL SUCCINATE 100 MG: 100 TABLET, FILM COATED, EXTENDED RELEASE ORAL at 09:02

## 2017-02-18 RX ADMIN — AMLODIPINE BESYLATE 5 MG: 5 TABLET ORAL at 09:02

## 2017-02-18 RX ADMIN — ACETAMINOPHEN 1000 MG: 500 TABLET ORAL at 06:02

## 2017-02-18 RX ADMIN — STANDARDIZED SENNA CONCENTRATE AND DOCUSATE SODIUM 1 TABLET: 8.6; 5 TABLET, FILM COATED ORAL at 09:02

## 2017-02-18 RX ADMIN — ACETAMINOPHEN 1000 MG: 500 TABLET ORAL at 01:02

## 2017-02-18 RX ADMIN — POLYETHYLENE GLYCOL 3350 17 G: 17 POWDER, FOR SOLUTION ORAL at 09:02

## 2017-02-18 RX ADMIN — CEFAZOLIN SODIUM 2 G: 2 SOLUTION INTRAVENOUS at 01:02

## 2017-02-18 RX ADMIN — ASPIRIN 325 MG: 325 TABLET, DELAYED RELEASE ORAL at 09:02

## 2017-02-18 RX ADMIN — FAMOTIDINE 20 MG: 20 TABLET, FILM COATED ORAL at 09:02

## 2017-02-18 RX ADMIN — ROPIVACAINE HYDROCHLORIDE 8 ML/HR: 2 INJECTION, SOLUTION EPIDURAL; INFILTRATION at 09:02

## 2017-02-18 RX ADMIN — LOSARTAN POTASSIUM 100 MG: 25 TABLET, FILM COATED ORAL at 09:02

## 2017-02-18 RX ADMIN — ALLOPURINOL 200 MG: 100 TABLET ORAL at 09:02

## 2017-02-18 NOTE — PT/OT/SLP PROGRESS
"Physical Therapy         Treatment        Ej Miller   MRN: [id number]     PT Received On: 02/18/17  PT Start Time: 1350     PT Stop Time: 1405    PT Total Time (min): 15 min       Billable Minutes:  Therapeutic Activity 15      General Precautions: Standard, fall  Orthopedic Precautions : RLE weight bearing as tolerated  Braces: N/A    Subjective:  Communicated with RN prior to session.    "I have a running board on my [place]"    Pain Rating: 3/10 R knee  Pain Rating Post-Intervention: 3/10    Objective:  Patient found with: perineural catheter, peripheral IV    Pt found seated up in chair     Functional Mobility:    Bed Mobility:    Did not occur     Transfers:    Sit <> Stand Assistance: Stand By Assistance  Sit <> Stand Assistive Device: Rolling Walker  Bed <> Chair Technique: Stand Pivot  Bed <> Chair Assistance: Contact Guard Assistance    Ambulation:    Gait Distance: ~10 feet in room  Assistance 1: Stand by Assistance  Gait Assistive Device: Rolling walker  Gait Pattern: 3-point gait  Gait Deviation(s): decreased madeline, decreased step length, decreased stride length, decreased weight-shifting ability, decreased toe-to-floor clearance     Stairs:  Pt ascended/descend 4" curb step forward and backward with Rolling Walker with Stand-by Assistance.     Balance:   Static Sit: GOOD: Takes MODERATE challenges from all directions  Dynamic Sit:  GOOD: Maintains balance through MODERATE excursions of active trunk movement  Static Stand: FAIR: Maintains without assist but unable to take challenges  Dynamic stand: FAIR: Needs CONTACT GUARD during gait    Therapeutic Activities and Exercises:  PT reviewed and simulated car transfers with patient and wife.  Pt verbalized and demonstrated understanding.      Patient left up in chair with all lines intact, call button in reach, CM notified and wife present.    AM-PAC 6 CLICK MOBILITY  1 = Unable, Total/Dependent Assistance  2 = A lot, Maximum/Moderate Assistance  3 = " A little, Minimum/Contact Guard/Supervision  4 = None, Modified [place]/Independent  Turning over in bed (including adjusting bedclothes, sheets and blankets)?: 4  Sitting down on and standing up from a chair with arms (e.g., wheelchair, bedside commode, etc.): 3  Moving from lying on back to sitting on the side of the bed?: 4  Moving to and from a bed to a chair (including a wheelchair)?: 3  Need to walk in hospital room?: 3  Climbing 3-5 steps with a railing?: 3  Total Score: 20    AM-PAC Raw Score   CMS G-Code Modifier   Level of Impairment   Assistance     6   CN   100%         Total / Unable   7 - 9   CM   80 - 100%   Maximal Assist     10 - 14   CL   60 - 80%   Moderate Assist     15 - 19   CK   40 - 60%   Moderate Assist     20 - 22   CJ   20 - 40%   Minimal Assist     23   CI   1-20%         SBA / CGA     24 CH   0%   Independent/Modified Independent       Assessment:  Ej Miller is a 76 y.o. male with a medical diagnosis of Primary osteoarthritis of both knees. He presents with deficits listed below.  Pt tolerated treatment well today.  Pt will continue to benefit from skilled PT services to address the below deficits and to increase functional independence.      Rehab identified problem list/impairments: weakness, impaired endurance, impaired self care skills, impaired functional mobilty, gait instability, decreased ROM, orthopedic precautions, edema, impaired skin, decreased lower extremity function    Rehab potential is good.    Activity tolerance: Good    Discharge Facility/Level Of Care Needs: home with home health     Barriers to Discharge: None    Equipment Needed After Discharge: bedside commode, walker, rolling     GOALS:   Physical Therapy Goals        Problem: Physical Therapy Goal    Goal Priority Disciplines Outcome Goal Variances Interventions   Physical Therapy Goal     PT/OT, PT Ongoing (interventions implemented as appropriate)     Description:  Goals to be met by: 2/24/17      Patient will increase functional independence with mobility by performin. Supine to sit with Set-up [place] - Met 2017   2. Sit to supine with Set-up [place] - Met 2017   3. Sit to stand transfer with Supervision   4. Bed to chair transfer with Stand-by Assistance using Rolling Walker  5. Gait  x 150 feet with Stand-by Assistance using Rolling Walker.   6. Ascend/descend 4 stair steps with right Handrails Contact Guard Assistance.   7. Ascend/Descend 7 inch curb step with Contact Guard Assistance using Rolling Walker.  8. Lower extremity exercise program x20-30 reps per handout, with independence                 PLAN:    Patient to be seen BID  to address the above listed problems via gait training, therapeutic activities, therapeutic exercises, neuromuscular re-education  Plan of Care Expires: 17  Plan of Care reviewed with: patient    Andrey Du, PT, DPT  2017   [phone]

## 2017-02-18 NOTE — DISCHARGE INSTRUCTIONS
Home health set up with [facility], [phone], they will be out tomorrow

## 2017-02-18 NOTE — PLAN OF CARE
Problem: Patient Care Overview  Goal: Plan of Care Review  Outcome: Ongoing (interventions implemented as appropriate)   Pt AAOx3. No falls noted, fall precautions in place. No skin breakdown noted. Pt encouraged to eat and drink. Polar ice on. No pain noted. VSS. Will continue to monitor.

## 2017-02-18 NOTE — PROGRESS NOTES
Daily Orthopaedic Progress Note    Ej Miller is a 76 y.o. male admitted on 2/17/2017  Hospital Day: 1      The patient was seen and examined this morning at the bedside. Patient reports no acute issues overnight.  Pain well controlled with meds.  Worked with PT in PACU, ambulated several steps, tolerated well    PHYSICAL EXAM:  Awake/alert/oriented x3, No acute distress, Afebrile, Vital signs stable  Good inspiratory effort with unlaboured breathing  Dressings c/d/i  NVI in operative limb    Vitals:    02/17/17 1540 02/17/17 2000 02/18/17 0000 02/18/17 0420   BP: (!) 161/79 (!) 153/72 (!) 170/80 138/67   BP Location: Right arm Right arm Right arm Right arm   Patient Position: Lying Lying Lying Lying   BP Method: Automatic Automatic Automatic Automatic   Pulse: 77 73 76 74   Resp: 18 17 18 17   Temp: 97.6 °F (36.4 °C) 96.5 °F (35.8 °C) 96.6 °F (35.9 °C) 96.8 °F (36 °C)   TempSrc: Oral Oral Oral Oral   SpO2: 96% (!) 94% 95% 95%   Weight:       Height:         I/O last 3 completed shifts:  In: 3360 [P.O.:360; I.V.:3000]  Out: -     Recent Labs  Lab 02/17/17  1121   CALCIUM 8.3*   *   K 4.6   CO2 25      BUN 15   CREATININE 1.1     No results for input(s): WBC, RBC, HGB, HCT, PLT in the last 72 hours.  No results for input(s): INR, APTT in the last 72 hours.    Invalid input(s): PT    A/P: 76 y.o. male 1 Day Post-Op s/p right TKA  WB status: wbat  PT/OT  Pain control  Abx: post op  DVT PPx: ASA  Dc bulky    Dispo: dc home today after PT    Trevor Willingham M.D. PGY2  Orthopedic Surgery Resident

## 2017-02-18 NOTE — PLAN OF CARE
Problem: Patient Care Overview  Goal: Plan of Care Review  Outcome: Ongoing (interventions implemented as appropriate)  Pt alert and oriented to name, , place, and situation. Pt bed in lowest position with call light within reach. No falls reported or observed during shift. Pt pain and assessed during shift. Pt FCDs in place. Pt voids per urinal.

## 2017-02-18 NOTE — PLAN OF CARE
Problem: Physical Therapy Goal  Goal: Physical Therapy Goal  Goals to be met by: 17     Patient will increase functional independence with mobility by performin. Supine to sit with Set-up [place] - Met 2017   2. Sit to supine with Set-up [place] - Met 2017   3. Sit to stand transfer with Supervision   4. Bed to chair transfer with Stand-by Assistance using Rolling Walker  5. Gait x 150 feet with Stand-by Assistance using Rolling Walker.   6. Ascend/descend 4 stair steps with right Handrails Contact Guard Assistance.   7. Ascend/Descend 7 inch curb step with Contact Guard Assistance using Rolling Walker.  8. Lower extremity exercise program x20-30 reps per handout, with independence   Outcome: Ongoing (interventions implemented as appropriate)  PT eval complete and POC initiated.

## 2017-02-18 NOTE — PLAN OF CARE
Pt being discharged home with HH and DME.  Pt states did not need a BSC, but did need a rolling walker.  Spoke with [employer], and faxed order to [phone].  Put in call to [facility] [phone], per pt choice, awaiting call back to see if they can take pt.  Pt's wife is providing transportation home.      Received call back [facility] unable to see pt.  Pt was set up with [facility] , [phone], and orders and paperwork faxed to [phone].  After walker delivered to room, pt can be discharged home.

## 2017-02-18 NOTE — PLAN OF CARE
Pt alert and oriented to name, , place, and situation. Pt bed in lowest position with call light within reach. Pt pain assessed and managed. IS teaching done and pt return demonstration. Pt FCDs in place and polar ice in place. Pt void per urinal at this time.

## 2017-02-18 NOTE — PT/OT/SLP PROGRESS
"Physical Therapy         Treatment        Ej Miller   MRN: [id number]     PT Received On: 17  PT Start Time: 1028     PT Stop Time: 1107    PT Total Time (min): 39 min       Billable Minutes:  Gait Training 15, Therapeutic Activity 9 and Therapeutic Exercise 15      General Precautions: Standard, fall  Orthopedic Precautions : RLE weight bearing as tolerated  Braces: N/A    Subjective:  Communicated with RN prior to session.    "I'm ready to get out of here"    Pain Ratin/10 in R knee  Pain Rating Post-Intervention: 4/10    Objective:  Patient found with: FCD, perineural catheter, peripheral IV    Pt found supine in bed     Functional Mobility:    Bed Mobility:  Supine to Sit: Supervision  Sit to Supine: Supervision    Transfers:    Sit <> Stand Assistance: Stand By Assistance  Sit <> Stand Assistive Device: Rolling Walker    Ambulation:    Gait Distance: ~120 feet with no LOB or SOB.    Assistance 1: Stand by Assistance  Gait Assistive Device: Rolling walker  Gait Pattern: 3-point gait  Gait Deviation(s): decreased madeline, decreased step length, decreased stride length, decreased weight-shifting ability, decreased toe-to-floor clearance    Balance:   Static Sit: GOOD: Takes MODERATE challenges from all directions  Dynamic Sit:  GOOD: Maintains balance through MODERATE excursions of active trunk movement  Static Stand: FAIR+: Takes MINIMAL challenges from all directions  Dynamic stand: FAIR: Needs CONTACT GUARD during gait    Therapeutic Activities and Exercises:  Supine therex per handout (Knee protocol), 30x each  · Ankle pumps  · Quad sets  · Glute squeezes  · SAQ  · Heel slides  · Supine hip abduction  · SLR  · LAQ      Pt educated on:  · Common signs and symptoms associated with TKA  · Importance of protecting surgical incision during functional tasks to reduce the risk of bleeding/infection  · HEP    Patient left up in chair with all lines intact, call button in reach and wife " present.    AM-PAC 6 CLICK MOBILITY  1 = Unable, Total/Dependent Assistance  2 = A lot, Maximum/Moderate Assistance  3 = A little, Minimum/Contact Guard/Supervision  4 = None, Modified [place]/Independent  Turning over in bed (including adjusting bedclothes, sheets and blankets)?: 4  Sitting down on and standing up from a chair with arms (e.g., wheelchair, bedside commode, etc.): 3  Moving from lying on back to sitting on the side of the bed?: 4  Moving to and from a bed to a chair (including a wheelchair)?: 3  Need to walk in hospital room?: 3  Climbing 3-5 steps with a railing?: 3  Total Score: 20    AM-PAC Raw Score   CMS G-Code Modifier   Level of Impairment   Assistance     6   CN   100%         Total / Unable   7 - 9   CM   80 - 100%   Maximal Assist     10 - 14   CL   60 - 80%   Moderate Assist     15 - 19   CK   40 - 60%   Moderate Assist     20 - 22   CJ   20 - 40%   Minimal Assist     23   CI   1-20%         SBA / CGA     24 CH   0%   Independent/Modified Independent       Assessment:  Ej Miller is a 76 y.o. male with a medical diagnosis of Primary osteoarthritis of both knees. He presents with deficits listed below.  Pt tolerated treatment well and demonstrates good understanding of HEP.  Pt will continue to benefit from skilled PT services to address the below deficits and to increase functional independence.      Rehab identified problem list/impairments: weakness, impaired endurance, impaired self care skills, impaired functional mobilty, gait instability, impaired balance, edema, orthopedic precautions, impaired skin, decreased ROM    Rehab potential is good.    Activity tolerance: Good    Discharge Facility/Level Of Care Needs: home with home health     Barriers to Discharge: None    Equipment Needed After Discharge: bedside commode, walker, rolling     GOALS:   Physical Therapy Goals        Problem: Physical Therapy Goal    Goal Priority Disciplines Outcome Goal Variances  Interventions   Physical Therapy Goal     PT/OT, PT Ongoing (interventions implemented as appropriate)     Description:  Goals to be met by: 17     Patient will increase functional independence with mobility by performin. Supine to sit with Set-up [place] - Met 2017   2. Sit to supine with Set-up [place] - Met 2017   3. Sit to stand transfer with Supervision   4. Bed to chair transfer with Stand-by Assistance using Rolling Walker  5. Gait  x 150 feet with Stand-by Assistance using Rolling Walker.   6. Ascend/descend 4 stair steps with right Handrails Contact Guard Assistance.   7. Ascend/Descend 7 inch curb step with Contact Guard Assistance using Rolling Walker.  8. Lower extremity exercise program x20-30 reps per handout, with independence                 PLAN:    Patient to be seen BID  to address the above listed problems via gait training, therapeutic activities, therapeutic exercises, neuromuscular re-education  Plan of Care Expires: 17  Plan of Care reviewed with: patient    Andrey Du, PT, DPT  2017   [phone]

## 2017-02-18 NOTE — ANESTHESIA POST-OP PAIN MANAGEMENT
Acute Pain Service and Perioperative Surgical Home Progress Note    HPI  Ej Miller is a 76 y.o., male with PMHx HTN, gout, PUD, and OA of B knees who is s/p R TKA.    Interval history  NAEON. Pt pain well controlled. Tolerating diet. No n/v. Passing flatus. No BM. Will f/u PT/OT eval. No other acute issues.    Surgery:  Procedure(s) (LRB):  REPLACEMENT-KNEE-TOTAL jose armando (Right)    Post Op Day #: 1    Catheter type: Perineural Adductor Canal    Infusion type: Ropivacaine 0.2%  8 ml/hr basal    Problem List:    Active Hospital Problems    Diagnosis  POA    *Primary osteoarthritis of both knees [M17.0]  Yes      Resolved Hospital Problems    Diagnosis Date Resolved POA   No resolved problems to display.       Subjective:       General appearance of alert, oriented, no complaints   Pain with rest: 1    Numbers   Pain with movement: 2    Numbers   Side Effects    1. Pruritis No    2. Nausea No    3. Motor Blockade No, 0=Ability to raise lower extremities off bed    4. Sedation No, 1=awake and alert    Schedule Medications:    acetaminophen  1,000 mg Oral Q6H    allopurinol  200 mg Oral Daily    amlodipine  5 mg Oral Daily    aspirin  325 mg Oral BID    celecoxib  200 mg Oral Daily    famotidine  20 mg Oral BID    labetalol  10 mg Intravenous Once    latanoprost  1 drop Both Eyes Daily    losartan  100 mg Oral Daily    metoprolol succinate  100 mg Oral Daily    polyethylene glycol  17 g Oral Daily    pregabalin  75 mg Oral Once    senna-docusate 8.6-50 mg  1 tablet Oral BID    thiamine (VITAMIN B1) IVPB  100 mg Intravenous Q24H        Continuous Infusions:   ropivacaine (PF) 2 mg/ml (0.2%) 8 mL/hr (02/17/17 1153)        PRN Medications:  bisacodyl, morphine, morphine, morphine, naloxone, ondansetron, oxycodone, oxycodone, oxycodone, ramelteon, sodium chloride 0.9%       Antibiotics:  Antibiotics     None             Objective:     Catheter site clean, dry, intact          Vital Signs (Most  Recent):  Temp: 36 °C (96.8 °F) (02/18/17 0420)  Pulse: 74 (02/18/17 0420)  Resp: 17 (02/18/17 0420)  BP: 138/67 (02/18/17 0420)  SpO2: 95 % (02/18/17 0420) Vital Signs Range (Last 24H):  Temp:  [35.8 °C (96.5 °F)-36.4 °C (97.6 °F)]   Pulse:  [69-82]   Resp:  [14-24]   BP: (130-189)/(66-99)   SpO2:  [94 %-97 %]          I & O (Last 24H):  Intake/Output Summary (Last 24 hours) at 02/18/17 0927  Last data filed at 02/18/17 0200   Gross per 24 hour   Intake             4710 ml   Output              650 ml   Net             4060 ml       Physical Exam:    GA: Alert, comfortable, no acute distress.   Pulmonary: Clear to auscultation A/P/L. No wheezing, crackles, or rhonchi.  Cardiac: RRR S1 & S2 w/o rubs/murmurs/gallops.   Abdominal:Bowel sounds present. No tenderness to palpation or distension. No appreciable hepatosplenomegaly.   Skin: No jaundice, rashes, or visible lesions.         Laboratory:  CBC: No results for input(s): WBC, RBC, HGB, HCT, PLT, MCV, MCH, MCHC in the last 72 hours.    BMP: Recent Labs      02/17/17   1121   NA  135*   K  4.6   CO2  25   CL  102   BUN  15   CREATININE  1.1   GLU  156*   CALCIUM  8.3*       No results for input(s): INR, PROTIME, APTT in the last 72 hours.    Invalid input(s): PT      Anticoagulant dose ASA 325mg.    Assessment:  Ej Miller is a 76 y.o., male with PMHx HTN, gout, PUD, and OA of B knees who is s/p R TKA.       Pain control adequate    Plan:     1) Pain:  Adductor canal perineural catheter in place and infusing. Good level. Multimodal pain regimen with acetaminophen, Celebrex, Lyrica, and prn oxycodone with IV Morphine for breakthrough.  Will continue to monitor. Plan to D/C perineural catheter in AM or home with On-Q if patient discharged today.   2) HTN: cont amlodipine, labetalol, and losartan.    3) FEN/GI: Tolerating liquids, advance diet as tolerated. Passing flatus. No BM.   4) Dispo: Pt working well with PT/OT. Case management and SW for placement.  Plan for D/C tomorrow afternoon or possibly today if patient works well with PT and meets goals.      Evaluator Bruce Ramsey MD

## 2017-02-18 NOTE — DISCHARGE SUMMARY
[facility]  Discharge Summary      Admit Date: 2/17/2017    Discharge Date and Time: 2/18/2017  2:54 PM    Attending Physician: Wagner Ayala MD     Reason for Admission: right TKA    Procedures Performed: Procedure(s) (LRB):  REPLACEMENT-KNEE-TOTAL jose armando (Right)    Hospital Course (synopsis of major diagnoses, care, treatment, and services provided during the course of the hospital stay): Hospital Course:  On 2/17/2017, the patient arrived to the [facility] on the second floor of [facility] for proper pre-operative management.  Upon completion of pre-operative preparation, the patient was taken back to the operative theatre.  A right TKA was performed without complication and the patient was transported to the post anesthesia care unit in stable condition.  After appropriate recovery from the anaesthetic agents used during the surgery, the patient was then transported to the hospital inpatient floor.  The interim of the hospital stay from arrival on the floor up to discharge has been uncomplicated. The patient has experienced minimal electrolyte imbalances that have been repleated accordingly.  The patient's diet has progressed to a regular diet with no nausea or vomiting.  The patient has transitioned from a perineural anesthesia unit and IV medication to an oral pain regimen and is currently pleased with the pain control on this regimen.  The patient had a baca catheter placed intraoperatively.  This baca was discontinued on post-operative day one and the patient has been voiding without difficulty ever since.  The patient began participation in physical therapy on post-operative day zero and has progressed throughout the entire hospital stay.  Currently the patient is ambulating with moderate assistance and the physical therapy team feels that the patient's progress is sufficient to allow the patient to be discharged home safely.  The patient agrees with this  assessment and desires a discharge to home today.       Consults: PT and OT    Significant Diagnostic Studies:     Final Diagnoses:    Principal Problem: Primary osteoarthritis of both knees   Secondary Diagnoses: same    Discharged Condition: stable    Disposition: Home-Health Care [facility]    Follow Up/Patient Instructions:     Medications:  Reconciled Home Medications:   Discharge Medication List as of 2/18/2017  2:27 PM      START taking these medications    Details   aspirin 325 MG tablet Take 1 tablet (325 mg total) by mouth 2 (two) times daily., Starting 2/17/2017, Until Fri 3/24/17, Print      docusate sodium (COLACE) 100 MG capsule Take 1 capsule (100 mg total) by mouth 2 (two) times daily as needed for Constipation., Starting 2/17/2017, Until Discontinued, Print      ondansetron (ZOFRAN) 8 MG tablet Take 1 tablet (8 mg total) by mouth every 12 (twelve) hours as needed for Nausea., Starting 2/17/2017, Until Discontinued, Print      oxycodone-acetaminophen (PERCOCET)  mg per tablet Take 1 tablet by mouth every 4 (four) hours as needed for Pain., Starting 2/17/2017, Until Discontinued, Print         CONTINUE these medications which have NOT CHANGED    Details   allopurinol (ZYLOPRIM) 100 MG tablet TAKE 2 TABLETS ONE TIME DAILY, Normal      amlodipine (NORVASC) 5 MG tablet TAKE 1 TABLET ONE TIME DAILY, Normal      latanoprost (XALATAN) 0.005 % ophthalmic solution Place 1 drop into both eyes once daily., Starting 7/7/2016, Until Fri 7/7/17, Normal      losartan (COZAAR) 100 MG tablet TAKE 1 TABLET ONE TIME DAILY, Normal      metoprolol succinate (TOPROL-XL) 100 MG 24 hr tablet TAKE 1 TABLET ONE TIME DAILY  (NEED  APPOINTMENT), Normal      colchicine 0.6 mg tablet Take 1 tablet (0.6 mg total) by mouth 2 (two) times daily., Starting 1/23/2013, Until Wed 2/5/14, Normal      glucosamine-chondroitin 500-400 mg tablet Take 1 tablet by mouth 3 (three) times daily., Until Discontinued, Historical Med     "  hydrocortisone 2.5 % cream Apply topically 2 (two) times daily., Starting 2/10/2017, Until Mon 2/20/17, Normal      MULTIVIT-MIN/FA/LYCOPEN/LUTEIN (MEN 50 PLUS MULTIVITAMIN ORAL) Take by mouth., Until Discontinued, Historical Med      naproxen sodium (ANAPROX) 220 MG tablet Take 220 mg by mouth 2 (two) times daily with meals., Until Discontinued, Historical Med      triamcinolone acetonide 0.1% (KENALOG) 0.1 % cream Apply topically 2 (two) times daily., Starting 8/9/2013, Until Mon 8/4/14, Historical Med             Discharge Procedure Orders  COMMODE FOR HOME USE   Order Specific Question Answer Comments   Type: Standard    Height: 5' 11" (1.803 m)    Weight: 94.8 kg (209 lb)    Length of need (1-99 months): 99      TRANSFER TUB BENCH FOR HOME USE   Order Specific Question Answer Comments   Type of Transfer Tub Bench: Padded    Height: 5' 11" (1.803 m)    Weight: 94.8 kg (209 lb)    Length of need (1-99 months): 99      WALKER FOR HOME USE   Order Specific Question Answer Comments   Type of Walker: Adult (5'4"-6'6")    With wheels? No    Height: 5' 11" (1.803 m)    Weight: 94.8 kg (209 lb)    Length of need (1-99 months): 99      WALKER FOR HOME USE   Order Specific Question Answer Comments   Type of Walker: Adult (5'4"-6'6")    With wheels? No    Height: 5' 11" (1.803 m)    Weight: 94.8 kg (209 lb)    Length of need (1-99 months): 99    Vendor: Other (use comments) Duplicate   Expected Date of Delivery: 2/17/2017      Diet general     Activity as tolerated     Keep surgical extremity elevated     Ice to affected area     Call MD for:  temperature >100.4     Call MD for:  persistent nausea and vomiting or diarrhea     Call MD for:  severe uncontrolled pain     Call MD for:  redness, tenderness, or signs of infection (pain, swelling, redness, odor or green/yellow discharge around incision site)     Call MD for:  difficulty breathing or increased cough     Call MD for:  severe persistent headache     Call MD for:  " worsening rash     Call MD for:  persistent dizziness, light-headedness, or visual disturbances     Call MD for:  increased confusion or weakness     No dressing needed     Leave dressing on - Keep it clean, dry, and intact until clinic visit       Follow-up Information     Follow up with Wagner Ayala MD In 2 weeks.    Specialty:  Orthopedic Surgery    Contact information:    [facility]  [facility] [address]  [phone]

## 2017-02-20 ENCOUNTER — TELEPHONE (OUTPATIENT)
Dept: ORTHOPEDICS | Facility: CLINIC | Age: 77
End: 2017-02-20

## 2017-02-20 NOTE — ADDENDUM NOTE
Addendum  created 02/19/17 1822 by Santino Chacon MD    Visit Navigator SmartForm Flowsheet section accepted

## 2017-02-20 NOTE — TELEPHONE ENCOUNTER
----- Message from Tripp Abdalla sent at 2/20/2017 10:27 AM CST -----  Contact: Jessica home health nurse  Jessica request a call to relay some information about the pt [phone]

## 2017-02-20 NOTE — ADDENDUM NOTE
Addendum  created 02/20/17 0608 by Clarisse Kumar MD    Anesthesia Event edited, Procedure Event Log accessed

## 2017-02-21 ENCOUNTER — PATIENT OUTREACH (OUTPATIENT)
Dept: ADMINISTRATIVE | Facility: CLINIC | Age: 77
End: 2017-02-21
Payer: MEDICARE

## 2017-02-21 NOTE — PATIENT INSTRUCTIONS
Total Knee Replacement  During total knee replacement surgery, your damaged knee joint is replaced with an artificial joint, called a prosthesis. This surgery almost always reduces joint pain and improves your quality of life.     The parts of the prosthesis are secured to the bones of the knee. Together they form the new joint.   Before your surgery  You will most likely arrive at the hospital on the morning of the surgery. Be sure to follow all of your doctors instructions on preparing for surgery:  · Stop eating or drinking 10 hours before surgery.  · If you take a daily medicine, ask if you should still take it the morning of surgery.  · At the hospital, your temperature, pulse, breathing, and blood pressure will be checked.  · An IV (intravenous) line will be started to provide fluids and medicines needed during surgery.  The surgical procedure  When the surgical team is ready, youll be taken to the operating room. There youll be given anesthesia to help you sleep through surgery, or to make you numb from the waist down. Then an incision is made on the front or side of your knee. Any damaged bone is cleaned away, and the new joint components are put into place. The incision is closed with surgical staples or stitches.  After your surgery  After surgery, youll be sent to the recovery room. When you are fully awake, youll be moved to your room. The nurses will give you medicines to ease your pain. You may have a catheter (small tube) in your bladder. A continuous passive motion machine may be used on your knee to keep it from getting stiff. A sequential compression machine may be used to prevent blood clots by gently squeezing then releasing your leg. You may be given medicine to prevent blood clots. Soon, healthcare providers will help you get up and moving.  When to call your doctor  Once at home, call your doctor if you have any of the symptoms below:  · An increase in knee pain  · Pain or swelling in the  calf or leg  · Unusual redness, heat, or drainage at the incision site  · Fever of 100.4°F (38°C) or higher  · Shaking chills  · Trouble breathing or chest pain (call 911)   © 7972-8531 [employer] [address] [place] [address]. All rights reserved. This information is not intended as a substitute for professional medical care. Always follow your healthcare professional's instructions.

## 2017-02-22 ENCOUNTER — TELEPHONE (OUTPATIENT)
Dept: ORTHOPEDICS | Facility: CLINIC | Age: 77
End: 2017-02-22

## 2017-02-22 NOTE — TELEPHONE ENCOUNTER
----- Message from Raymond Sánchez sent at 2/22/2017 10:11 AM CST -----  Contact: Jessicarick Lopez would like to speak with you regarding the pts tubing in his ball medication coming out and she also stated that the pt has a rash on his back. Jessica can be reached at [phone].

## 2017-02-22 NOTE — TELEPHONE ENCOUNTER
Spoke with Jessica and informed her that catheter and pain ball were good to come out. I also explained to her that they were no needles injected into the pt for this procedure.Jessica verbalized understanding.

## 2017-02-27 NOTE — PT/OT/SLP PROGRESS
Occupational Therapy  Treatment/Discharge Summary    Ej Miller   MRN: [id number]   Admitting Diagnosis: Primary osteoarthritis of both knees    OT Date of Treatment: 02/18/17   OT Start Time: 1130  OT Stop Time: 1153  OT Total Time (min): 23 min    Billable Minutes:  Self Care/Home Management 13 and Therapeutic Activity 10    General Precautions: Standard, fall  Orthopedic Precautions: RLE weight bearing as tolerated    Do you have any cultural, spiritual, Bahai conflicts, given your current situation?: None    Subjective:  Communicated with RN prior to session.    Pt agreeable to treatment    Pain Rating: 3/10  Location - Side: Right  Location: knee  Pain Addressed: Pre-medicate for activity, Reposition, Distraction    Objective:  Patient found with: perineural catheter, FCD, Polar ice     Functional Mobility:  Bed Mobility:  Supine to Sit: Stand by Assistance    Transfers:   Sit <> Stand Assistance: Contact Guard Assistance  Sit <> Stand Assistive Device: Rolling Walker   VCs for safety and technique    Functional Ambulation: CGA with RW within room with VCs for safety  Activities of Daily Living:  UE Dressing Level of Assistance: Stand by assistance    LE Dressing Level of Assistance: Minimum assistance  Vcs for technique    Therapeutic Activities and Exercises:  Pt educated on   · Safety and adaptations with ADLs/IADLs with RW  · LE dressing techniques  · Safety within the home environment  · Importance of keeping incision site dry until cleared by MD.     AM-PAC 6 CLICK ADL   How much help from another person does this patient currently need?   1 = Unable, Total/Dependent Assistance  2 = A lot, Maximum/Moderate Assistance  3 = A little, Minimum/Contact Guard/Supervision  4 = None, Modified [place]/Independent    Putting on and taking off regular lower body clothing? : 2  Bathing (including washing, rinsing, drying)?: 2  Toileting, which includes using toilet, bedpan, or urinal? : 3  Putting on  and taking off regular upper body clothing?: 3  Taking care of personal grooming such as brushing teeth?: 3  Eating meals?: 4  Total Score: 17     AM-PAC Raw Score CMS G-Code Modifier Level of Impairment Assistance   6 % Total / Unable   7 - 9 CM 80 - 100% Maximal Assist   10 - 14 CL 60 - 80% Moderate Assist   15 - 19 CK 40 - 60% Moderate Assist   20 - 22 CJ 20 - 40% Minimal Assist   23 CI 1-20% SBA / CGA   24 CH 0% Independent/ Mod I     Patient left up in chair with all lines intact, call button in reach and RN notified    ASSESSMENT:  Ej Miller is a 76 y.o. male with a medical diagnosis of Primary osteoarthritis of both knees and tolerated session well and with good participation. Pt impulsive and requiring VCs for safety throughout session. Pt would continue to benefit from OT services in the next level of care to maximize functional (I) and safety.    Rehab identified problem list/impairments: Rehab identified problem list/impairments: weakness, impaired endurance, impaired self care skills, impaired functional mobilty, gait instability, impaired balance, decreased lower extremity function, decreased safety awareness, pain, decreased ROM, edema, orthopedic precautions, impaired skin    Rehab potential is good.    Activity tolerance: Good    Discharge recommendations: Discharge Facility/Level Of Care Needs: home with home health     Barriers to discharge: Barriers to Discharge: None    Equipment recommendations: bedside commode, walker, rolling     GOALS:   Occupational Therapy Goals        Problem: Occupational Therapy Goal    Goal Priority Disciplines Outcome Interventions   Occupational Therapy Goal     OT, PT/OT     Description:  Goals to be met by: 02/24/17    Patient will increase functional independence with ADLs by performing:    UE Dressing with Modified [place].  LE Dressing with Supervision.  Grooming while standing at sink with Supervision.  Toileting from bedside commode with  Supervision for hygiene and clothing management.   Bathing from  edge of bed with Supervision.  Supine to sit with Modified [place].  Toilet transfer to bedside commode with Supervision.  Increased functional strength to 5/5 for BUE.  Upper extremity exercise program x15 reps per handout, with independence.                Plan:  D/C acute OT  Plan of Care reviewed with: patient, spouse         STELLA Sánchez  02/18/2017

## 2017-03-03 ENCOUNTER — OFFICE VISIT (OUTPATIENT)
Dept: ORTHOPEDICS | Facility: CLINIC | Age: 77
End: 2017-03-03
Payer: MEDICARE

## 2017-03-03 ENCOUNTER — HOSPITAL ENCOUNTER (OUTPATIENT)
Dept: RADIOLOGY | Facility: HOSPITAL | Age: 77
Discharge: HOME | End: 2017-03-03
Attending: ORTHOPAEDIC SURGERY
Payer: MEDICARE

## 2017-03-03 VITALS
DIASTOLIC BLOOD PRESSURE: 72 MMHG | SYSTOLIC BLOOD PRESSURE: 144 MMHG | HEART RATE: 61 BPM | BODY MASS INDEX: 28.7 KG/M2 | WEIGHT: 205 LBS | RESPIRATION RATE: 16 BRPM | HEIGHT: 71 IN

## 2017-03-03 DIAGNOSIS — M25.561 RIGHT KNEE PAIN, UNSPECIFIED CHRONICITY: ICD-10-CM

## 2017-03-03 DIAGNOSIS — Z96.651 STATUS POST TOTAL RIGHT KNEE REPLACEMENT: Primary | ICD-10-CM

## 2017-03-03 PROCEDURE — 73560 X-RAY EXAM OF KNEE 1 OR 2: CPT | Mod: 26,59,LT, | Performed by: RADIOLOGY

## 2017-03-03 PROCEDURE — 73562 X-RAY EXAM OF KNEE 3: CPT | Mod: 26,RT,, | Performed by: RADIOLOGY

## 2017-03-03 PROCEDURE — 73560 X-RAY EXAM OF KNEE 1 OR 2: CPT | Mod: TC,59,LT,RT

## 2017-03-03 PROCEDURE — 99024 POSTOP FOLLOW-UP VISIT: CPT | Mod: S$GLB,,, | Performed by: PHYSICIAN ASSISTANT

## 2017-03-03 PROCEDURE — 99999 PR PBB SHADOW E&M-EST. PATIENT-LVL IV: CPT | Mod: PBBFAC,,, | Performed by: PHYSICIAN ASSISTANT

## 2017-03-03 NOTE — PROGRESS NOTES
"Ej Miller presents for initial post-operative visit following a right total knee arthroplasty performed by Dr. Ayala on 2/17/2017. Tolerating pain medication well, taking only once daily.    Exam:   Blood pressure (!) 144/72, pulse 61, resp. rate 16, height 5' 11" (1.803 m), weight 93 kg (205 lb 0.4 oz).   Ambulating well with assistive device.  Incision is clean and dry without drainage or erythema. ROM:0-115    New XR  today reveals a well fixed and aligned prosthesis.    A/P:  2 weeks s/p right total knee replacement    - The patient was advised to keep the incision clean and dry for the next 24 hours after which he may wash the area with antibacterial soap in the shower. Will not submerge until the incision is completely healed.   - Outpatient PT ordered [place]  - Continue ASA for 1 month from surgery.  - Follow up in 4 weeks with Dr. Ayala. Pt will call clinic with problems/concerns.     "

## 2017-03-06 ENCOUNTER — TELEPHONE (OUTPATIENT)
Dept: ORTHOPEDICS | Facility: CLINIC | Age: 77
End: 2017-03-06

## 2017-03-13 ENCOUNTER — CLINICAL SUPPORT (OUTPATIENT)
Dept: REHABILITATION | Facility: HOSPITAL | Age: 77
End: 2017-03-13
Attending: ORTHOPAEDIC SURGERY
Payer: MEDICARE

## 2017-03-13 DIAGNOSIS — R26.9 IMPAIRED GAIT: Primary | ICD-10-CM

## 2017-03-13 DIAGNOSIS — M25.561 ACUTE PAIN OF RIGHT KNEE: ICD-10-CM

## 2017-03-13 DIAGNOSIS — M25.661 DECREASED RANGE OF MOTION OF RIGHT KNEE: ICD-10-CM

## 2017-03-13 DIAGNOSIS — R29.898 WEAKNESS OF BOTH LOWER EXTREMITIES: ICD-10-CM

## 2017-03-13 PROCEDURE — 97164 PT RE-EVAL EST PLAN CARE: CPT

## 2017-03-13 PROCEDURE — G8979 MOBILITY GOAL STATUS: HCPCS | Mod: CK

## 2017-03-13 PROCEDURE — G8978 MOBILITY CURRENT STATUS: HCPCS | Mod: CK

## 2017-03-13 NOTE — PLAN OF CARE
"Name:Ej Miller  Physician:[facility], [facility] L[facility] Jr., MD  Date of eval:3/13/2017  Orders:  Physical Therapy evaluate and treat  Clinical#:[id number]  Diagnosis:  1. Impaired gait     2. Decreased range of motion of right knee     3. Acute pain of right knee     4. Weakness of both lower extremities         Subjective: stated "I don't really need to use the cane." states he had 9 HH PT visits.    Onset of pain: 10 years  Mechanism of onset: gradually worsened    Chief complaint: Patient is a 75 yo WM referred to OP PT for pre-hab prior to his scheduled R TKR on 2/17/17. Returns to clinic today to resume OP PT     Aggravating factors: bending the knee, standing, and walking.  Easing factors: rest, stop activity    Previous functional status includes: I with amb and ADL  Current functional status: amb with cane, I with ADL    Patients structured exercise routine: HHPT  Exercise routine prior to onset : plays golf 2 times a week    Allergies:    Review of patient's allergies indicates:   Allergen Reactions    Ketoconazole Rash     Topical cream years ago used       Past Surgical History:   Procedure Laterality Date    APPENDECTOMY      TONSILLECTOMY, ADENOIDECTOMY         Medical history:   Past Medical History:   Diagnosis Date    Cataract     Gastric ulcer; benign     Glaucoma     Glaucoma suspect of both eyes     Gout attack     Gout, joint     HTN (hypertension)     Hyperglycemia     Osteoarthritis of knee     bilaterial    Peptic ulcer          Medication:   Current Outpatient Prescriptions on File Prior to Visit   Medication Sig Dispense Refill    allopurinol (ZYLOPRIM) 100 MG tablet TAKE 2 TABLETS ONE TIME DAILY 180 tablet 0    amlodipine (NORVASC) 5 MG tablet TAKE 1 TABLET ONE TIME DAILY 90 tablet 3    aspirin 325 MG tablet Take 1 tablet (325 mg total) by mouth 2 (two) times daily. 70 tablet 0    colchicine 0.6 mg tablet Take 1 tablet (0.6 mg total) by mouth 2 (two) times daily. 60 tablet 6 "    docusate sodium (COLACE) 100 MG capsule Take 1 capsule (100 mg total) by mouth 2 (two) times daily as needed for Constipation. 60 capsule 1    glucosamine-chondroitin 500-400 mg tablet Take 1 tablet by mouth 3 (three) times daily.      hydrocortisone 2.5 % cream Apply topically 2 (two) times daily. 1 Tube 0    latanoprost (XALATAN) 0.005 % ophthalmic solution Place 1 drop into both eyes once daily. 2.5 mL 12    losartan (COZAAR) 100 MG tablet TAKE 1 TABLET ONE TIME DAILY 90 tablet 3    metoprolol succinate (TOPROL-XL) 100 MG 24 hr tablet TAKE 1 TABLET ONE TIME DAILY  (NEED  APPOINTMENT) 90 tablet 3    MULTIVIT-MIN/FA/LYCOPEN/LUTEIN (MEN 50 PLUS MULTIVITAMIN ORAL) Take by mouth.      naproxen sodium (ANAPROX) 220 MG tablet Take 220 mg by mouth 2 (two) times daily with meals.      ondansetron (ZOFRAN) 8 MG tablet Take 1 tablet (8 mg total) by mouth every 12 (twelve) hours as needed for Nausea. 60 tablet 0    oxycodone-acetaminophen (PERCOCET)  mg per tablet Take 1 tablet by mouth every 4 (four) hours as needed for Pain. 90 tablet 0    triamcinolone acetonide 0.1% (KENALOG) 0.1 % cream Apply topically 2 (two) times daily.       No current facility-administered medications on file prior to visit.          Special Tests:  MRI:  X-ray: 3/3/17  Right knee: There is a TKA in place good alignment no complication.    Left knee demonstrates mild-moderate DJD, and a varus deformity and a remote proximal fibular fracture.      Past treatment includes: surgery, HHPT    Social: . Lives in single level home.    Pts goals:  Return to playing golf  Pain level with 0 being the lowest and 10 being the highest presently: 2  Pain level with 0 being the lowest and 10 being the highest at worst:  5    OBJECTIVE: 77 yo WM ambulating with straight cane.    Functional assessment:   - walking: amb I throughout clinic with straight cane. Decreased weight shift onto R LE.  - sit to stand: I    AROM  LE MMT  R  L   "  Hip flexion  4/5  4/5    Hip abduction  3+/5  3+/5    Hip extension  3/5  3/5    Hip ER  5/5  5/5    Hip IR  5/5  5/5    Knee extension  4/5  5/5    Knee flexion  4/5  5/5    Ankle dorsiflexion  5/5  5/5    Ankle plantar flexion  5/5  5/5        Flexibility testing:  - hamstrings:        B: WNL  - gastrocnemius:  B: WNL. R: 10 deg, L: 10 deg  - piriformis:           B: tight, decreased 50%  - quadriceps:        B: tight, R decreased 75%, L decreased 50%  - hip adductors:    B: tight, decreased 50%  - IT bands:            B: tight, decreased 25%    Joint mobility:  R knee AROM: 5 to 110 deg  L knee AROM: 0 to 130 deg    Other:    30 Seconds Sit to Stand: 0, unable to achieve standing without using his UEs    Timed Up and Go:   13 seconds        Functional Limitations  Reports - G Codes    Category:  Mobility   Tool:  FOTO Outcomes Measurement System.   Score:  Patient scored out 45 of 100 on the FOTO Outcomes Measurement System.   Current:   CK at least 40% < 60% impaired, limited or restricted   Goal:   CK at least 40% < 60% impaired, limited or restricted     Patient History Examination Clinical Presentation Clinical Decision Making   Comorbidities:      Personal Factors:         Activity and Participation Restriction:  Limits walking for golfing secondary pain    Body Systems:  Musculoskeletal: strength    Body Regions: LEs Stable and uncomplicated     Low       FOTO: 40% < 60% impaired, limited or restricted       TREATMENT: reassessment., ther ex x 25 minutes  Reassessment.    R toe touches with R knee pulled into extension x 30 reps  Hamstring stretches with towel 3 x 30"  SLR 2 x 10  X 5"   Standing TKEs with OTB 2 x 20 x 5"  Leg press 2 x 10 reps with 4 plates  single leg press 2 x 10 reps with 2 plates  LAQs 2 x 10 x 5" with 1 1/2#      No cultural, environmental, or spiritual barriers identified to treatment or learning.    ASSESSMENT: PT diagnosis: B knee pain, decreased B LE strength and " flexibility.  Patient can benefit from outpatient physical therapy and a home program. Prognosis is good. Will proceed with B LE strengthening exercises to increase R knee extension, increase R knee AROM, promote I community ambulation for patient to return to golfing activities.    Medical necessity is demonstrated by the following IMPAIRMENTS:  - pain  - decreased flexibility  - decreased muscle strength  - impaired function  - decreased recreational sports ability  - decreased exercise ability    GOALS: 6-8 weeks (5/13/17). Pt agrees with goals set.  1. Independent with HEP.  2. Increased B LE strength to 5/5 to promote proper pelvic stability to decrease R knee pain to < or = 2/10 with ADL such as bending his R knee, prolonged standing, and increased walking times.  3. Increase R knee AROM 0 to 120 deg  4. Increased flexibility in B quads, B IT bands, B hip adductors, and B piriformis to promote proper pelvic stability to decrease R knee pain to < or = 2/10 with adls such as bending his R knee, prolonged standing, and increased walking times.  5. I community ambulation.  6. Patient to achieve CK (at least 40% < 60% impaired, limited or restricted) level on the FOTO Outcomes Measurement System.      PLAN:  Outpatient physical therapy 2 times weekly to include: pt ed, hep, therapeutic exercises, neuromuscular re-education/ balance exercises, joint mobilizations, and modalities prn. Pt may be seen by PTA to carry out plan of care.      I certify the need for these services furnished under this plan of treatment and while under my care.      ____________________________________ Physician/Referring Practitioner         _____________________ Date of Signature

## 2017-03-13 NOTE — PROGRESS NOTES
"Name:Ej Miller  Physician:[facility], John L. Jr., MD  Date of eval:3/13/2017  Orders:  Physical Therapy evaluate and treat  Clinical#:[id number]  Diagnosis:  1. Impaired gait     2. Decreased range of motion of right knee     3. Acute pain of right knee     4. Weakness of both lower extremities         Subjective: stated "I don't really need to use the cane." states he had 9 HH PT visits.    Onset of pain: 10 years  Mechanism of onset: gradually worsened    Chief complaint: Patient is a 77 yo WM referred to OP PT for pre-hab prior to his scheduled R TKR on 2/17/17. Returns to clinic today to resume OP PT     Aggravating factors: bending the knee, standing, and walking.  Easing factors: rest, stop activity    Previous functional status includes: I with amb and ADL  Current functional status: amb with cane, I with ADL    Patients structured exercise routine: HHPT  Exercise routine prior to onset : plays golf 2 times a week    Allergies:    Review of patient's allergies indicates:   Allergen Reactions    Ketoconazole Rash     Topical cream years ago used       Past Surgical History:   Procedure Laterality Date    APPENDECTOMY      TONSILLECTOMY, ADENOIDECTOMY         Medical history:   Past Medical History:   Diagnosis Date    Cataract     Gastric ulcer; benign     Glaucoma     Glaucoma suspect of both eyes     Gout attack     Gout, joint     HTN (hypertension)     Hyperglycemia     Osteoarthritis of knee     bilaterial    Peptic ulcer          Medication:   Current Outpatient Prescriptions on File Prior to Visit   Medication Sig Dispense Refill    allopurinol (ZYLOPRIM) 100 MG tablet TAKE 2 TABLETS ONE TIME DAILY 180 tablet 0    amlodipine (NORVASC) 5 MG tablet TAKE 1 TABLET ONE TIME DAILY 90 tablet 3    aspirin 325 MG tablet Take 1 tablet (325 mg total) by mouth 2 (two) times daily. 70 tablet 0    colchicine 0.6 mg tablet Take 1 tablet (0.6 mg total) by mouth 2 (two) times daily. 60 tablet 6 "    docusate sodium (COLACE) 100 MG capsule Take 1 capsule (100 mg total) by mouth 2 (two) times daily as needed for Constipation. 60 capsule 1    glucosamine-chondroitin 500-400 mg tablet Take 1 tablet by mouth 3 (three) times daily.      hydrocortisone 2.5 % cream Apply topically 2 (two) times daily. 1 Tube 0    latanoprost (XALATAN) 0.005 % ophthalmic solution Place 1 drop into both eyes once daily. 2.5 mL 12    losartan (COZAAR) 100 MG tablet TAKE 1 TABLET ONE TIME DAILY 90 tablet 3    metoprolol succinate (TOPROL-XL) 100 MG 24 hr tablet TAKE 1 TABLET ONE TIME DAILY  (NEED  APPOINTMENT) 90 tablet 3    MULTIVIT-MIN/FA/LYCOPEN/LUTEIN (MEN 50 PLUS MULTIVITAMIN ORAL) Take by mouth.      naproxen sodium (ANAPROX) 220 MG tablet Take 220 mg by mouth 2 (two) times daily with meals.      ondansetron (ZOFRAN) 8 MG tablet Take 1 tablet (8 mg total) by mouth every 12 (twelve) hours as needed for Nausea. 60 tablet 0    oxycodone-acetaminophen (PERCOCET)  mg per tablet Take 1 tablet by mouth every 4 (four) hours as needed for Pain. 90 tablet 0    triamcinolone acetonide 0.1% (KENALOG) 0.1 % cream Apply topically 2 (two) times daily.       No current facility-administered medications on file prior to visit.          Special Tests:  MRI:  X-ray: 3/3/17  Right knee: There is a TKA in place good alignment no complication.    Left knee demonstrates mild-moderate DJD, and a varus deformity and a remote proximal fibular fracture.      Past treatment includes: surgery, HHPT    Social: . Lives in single level home.    Pts goals:  Return to playing golf  Pain level with 0 being the lowest and 10 being the highest presently: 2  Pain level with 0 being the lowest and 10 being the highest at worst:  5    OBJECTIVE: 75 yo WM ambulating with straight cane.    Functional assessment:   - walking: amb I throughout clinic with straight cane. Decreased weight shift onto R LE.  - sit to stand: I    AROM  LE MMT  R  L   "  Hip flexion  4/5  4/5    Hip abduction  3+/5  3+/5    Hip extension  3/5  3/5    Hip ER  5/5  5/5    Hip IR  5/5  5/5    Knee extension  4/5  5/5    Knee flexion  4/5  5/5    Ankle dorsiflexion  5/5  5/5    Ankle plantar flexion  5/5  5/5        Flexibility testing:  - hamstrings:        B: WNL  - gastrocnemius:  B: WNL. R: 10 deg, L: 10 deg  - piriformis:           B: tight, decreased 50%  - quadriceps:        B: tight, R decreased 75%, L decreased 50%  - hip adductors:    B: tight, decreased 50%  - IT bands:            B: tight, decreased 25%    Joint mobility:  R knee AROM: 5 to 110 deg  L knee AROM: 0 to 130 deg    Other:    30 Seconds Sit to Stand: 0, unable to achieve standing without using his UEs    Timed Up and Go:   13 seconds        Functional Limitations  Reports - G Codes    Category:  Mobility   Tool:  FOTO Outcomes Measurement System.   Score:  Patient scored out 45 of 100 on the FOTO Outcomes Measurement System.   Current:   CK at least 40% < 60% impaired, limited or restricted   Goal:   CK at least 40% < 60% impaired, limited or restricted     Patient History Examination Clinical Presentation Clinical Decision Making   Comorbidities:      Personal Factors:         Activity and Participation Restriction:  Limits walking for golfing secondary pain    Body Systems:  Musculoskeletal: strength    Body Regions: LEs Stable and uncomplicated     Low       FOTO: 40% < 60% impaired, limited or restricted       TREATMENT: reassessment., ther ex x 25 minutes  Reassessment.    R toe touches with R knee pulled into extension x 30 reps  Hamstring stretches with towel 3 x 30"  SLR 2 x 10  X 5"   Standing TKEs with OTB 2 x 20 x 5"  Leg press 2 x 10 reps with 4 plates  single leg press 2 x 10 reps with 2 plates  LAQs 2 x 10 x 5" with 1 1/2#      No cultural, environmental, or spiritual barriers identified to treatment or learning.    ASSESSMENT: PT diagnosis: B knee pain, decreased B LE strength and " flexibility.  Patient can benefit from outpatient physical therapy and a home program. Prognosis is good. Will proceed with B LE strengthening exercises to increase R knee extension, increase R knee AROM, promote I community ambulation for patient to return to golfing activities.    Medical necessity is demonstrated by the following IMPAIRMENTS:  - pain  - decreased flexibility  - decreased muscle strength  - impaired function  - decreased recreational sports ability  - decreased exercise ability    GOALS: 6-8 weeks (5/13/17). Pt agrees with goals set.  1. Independent with HEP.  2. Increased B LE strength to 5/5 to promote proper pelvic stability to decrease R knee pain to < or = 2/10 with ADL such as bending his R knee, prolonged standing, and increased walking times.  3. Increase R knee AROM 0 to 120 deg  4. Increased flexibility in B quads, B IT bands, B hip adductors, and B piriformis to promote proper pelvic stability to decrease R knee pain to < or = 2/10 with adls such as bending his R knee, prolonged standing, and increased walking times.  5. I community ambulation.  6. Patient to achieve CK (at least 40% < 60% impaired, limited or restricted) level on the FOTO Outcomes Measurement System.      PLAN:  Outpatient physical therapy 2 times weekly to include: pt ed, hep, therapeutic exercises, neuromuscular re-education/ balance exercises, joint mobilizations, and modalities prn. Pt may be seen by PTA to carry out plan of care.      I certify the need for these services furnished under this plan of treatment and while under my care.      ____________________________________ Physician/Referring Practitioner         _____________________ Date of Signature

## 2017-03-17 ENCOUNTER — CLINICAL SUPPORT (OUTPATIENT)
Dept: REHABILITATION | Facility: HOSPITAL | Age: 77
End: 2017-03-17
Attending: ORTHOPAEDIC SURGERY
Payer: MEDICARE

## 2017-03-17 DIAGNOSIS — M25.561 ACUTE PAIN OF RIGHT KNEE: ICD-10-CM

## 2017-03-17 DIAGNOSIS — R29.898 WEAKNESS OF BOTH LOWER EXTREMITIES: ICD-10-CM

## 2017-03-17 DIAGNOSIS — R26.9 IMPAIRED GAIT: ICD-10-CM

## 2017-03-17 DIAGNOSIS — M25.661 DECREASED RANGE OF MOTION OF RIGHT KNEE: ICD-10-CM

## 2017-03-17 PROCEDURE — 97110 THERAPEUTIC EXERCISES: CPT

## 2017-03-17 NOTE — PROGRESS NOTES
"                                                    Physical Therapy Progress Note     Name: Ej Miller  Clinic Number: [id number]  Diagnosis:   Encounter Diagnoses   Name Primary?    Decreased range of motion of right knee     Impaired gait     Acute pain of right knee     Weakness of both lower extremities      Physician: [facility], John L. Jr., MD  Treatment Orders: PT Evaluation and Treatment  Past Medical History:   Diagnosis Date    Cataract     Gastric ulcer; benign     Glaucoma     Glaucoma suspect of both eyes     Gout attack     Gout, joint     HTN (hypertension)     Hyperglycemia     Osteoarthritis of knee     bilaterial    Peptic ulcer        Precautions: standard    Evaluation date: 3/13/17  Visit #: 2/20  Authorization period expiration: 12/31/07    Subjective     Pt reports: limited ROM in R knee.    Pain Scale: before treatment: 2/10 currently; after treatment: NT    Objective   Therapeutic exercise: Ej Miller received therapeutic exercises to develop increase RLE ROM, strength/flexibility and hip stabilization for 52 minutes including:     TKE with strap, hand assist and quad set 15x5"  Quad set 10x5" w/ towel under knee, 10x5" w/o towel under knee  SLR hip flexion with 3" hold 2x10  SLR hip abduction 2x10 with 3" hold   Bridge w/ glut set 2x8 with 3" hold  SAQ 15x3" with quad set and 3" hold  Heel slides 2x15 with 5" hold  Quad stretch 2x30"      Manual therapy: Ej Miller received the following manual therapy techniques for 3 minutes: Patella mobilizations in all directions.    Modalities: Ej Miller received ice to R knee for 10 min post tx session.     Written Home Exercises Provided:   Pt demo good understanding of the education provided during today's tx session, including: quad sets, TKE belt stretch, supine slr, heel slides and quad stretch. Ej Miller demonstrated good return demonstration of activities.  Exercise code [id number]   "   Pt. education:  · Posture reeducation, body mechanics, HEP,activity modification/avoidance  · No spiritual or educational barriers to learning provided  · Pt has no cultural, educational or language barriers to learning provided.    Assessment   Pt demonstrated lack of R knee ROM in both extensions and flexion.   Pt tolerated tx well with no increase in symptoms.  HEP was expanded during today's tx session in order to promote quad strengthening and R Knee ROM.  Pt will continue to benefit from skilled outpatient physical therapy to address the remaining functional deficits, provide pt/family education, and to maximize pt's level of independence in the home and community environment.      Anticipated barriers to physical therapy: None    ASSESSMENT: PT diagnosis: B knee pain, decreased B LE strength and flexibility.  Patient can benefit from outpatient physical therapy and a home program. Prognosis is good. Will proceed with B LE strengthening exercises to increase R knee extension, increase R knee AROM, promote I community ambulation for patient to return to golfing activities.     Medical necessity is demonstrated by the following IMPAIRMENTS:  - pain  - decreased flexibility  - decreased muscle strength  - impaired function  - decreased recreational sports ability  - decreased exercise ability     GOALS: 6-8 weeks (5/13/17). Pt agrees with goals set.  1. Independent with HEP.  2. Increased B LE strength to 5/5 to promote proper pelvic stability to decrease R knee pain to < or = 2/10 with ADL such as bending his R knee, prolonged standing, and increased walking times.  3. Increase R knee AROM 0 to 120 deg  4. Increased flexibility in B quads, B IT bands, B hip adductors, and B piriformis to promote proper pelvic stability to decrease R knee pain to < or = 2/10 with adls such as bending his R knee, prolonged standing, and increased walking times.  5. I community ambulation.  6. Patient to achieve CK (at least 40%  < 60% impaired, limited or restricted) level on the FOTO Outcomes Measurement System.        PLAN:  Outpatient physical therapy 2 times weekly to include: pt ed, hep, therapeutic exercises, neuromuscular re-education/ balance exercises, joint mobilizations, and modalities prn. Pt may be seen by PTA to carry out plan of care.    · Pt's spiritual, cultural and educational needs considered and pt agreeable to plan of care and goals as stated below:        Plan   Outpatient physical therapy 2 times weekly to include: pt ed, hep, therapeutic exercises, neuromuscular re-education/ balance exercises, joint mobilizations, and modalities prn. Pt may be seen by PTA to carry out plan of care.

## 2017-03-20 ENCOUNTER — CLINICAL SUPPORT (OUTPATIENT)
Dept: REHABILITATION | Facility: HOSPITAL | Age: 77
End: 2017-03-20
Attending: ORTHOPAEDIC SURGERY
Payer: MEDICARE

## 2017-03-20 DIAGNOSIS — M25.661 DECREASED RANGE OF MOTION OF RIGHT KNEE: ICD-10-CM

## 2017-03-20 DIAGNOSIS — M25.561 ACUTE PAIN OF RIGHT KNEE: ICD-10-CM

## 2017-03-20 DIAGNOSIS — R29.898 WEAKNESS OF BOTH LOWER EXTREMITIES: ICD-10-CM

## 2017-03-20 DIAGNOSIS — R26.9 IMPAIRED GAIT: ICD-10-CM

## 2017-03-20 PROCEDURE — 97110 THERAPEUTIC EXERCISES: CPT

## 2017-03-20 PROCEDURE — 97010 HOT OR COLD PACKS THERAPY: CPT

## 2017-03-20 NOTE — PROGRESS NOTES
"                                                    Physical Therapy Progress Note     Name: Ej Miller  Clinic Number: [id number]  Diagnosis:   Encounter Diagnoses   Name Primary?    Decreased range of motion of right knee     Impaired gait     Acute pain of right knee     Weakness of both lower extremities      Physician: [facility], John L. Jr., MD  Treatment Orders: PT Evaluation and Treatment  Past Medical History:   Diagnosis Date    Cataract     Gastric ulcer; benign     Glaucoma     Glaucoma suspect of both eyes     Gout attack     Gout, joint     HTN (hypertension)     Hyperglycemia     Osteoarthritis of knee     bilaterial    Peptic ulcer        Precautions: standard    Evaluation date: 3/13/17  Visit #: 3/20  Authorization period expiration: 12/31/07    Subjective     Pt reports: soreness post last tx session.    Pain Scale: before treatment: 2/10 currently; after treatment: NT    Objective   Therapeutic exercise: Ej Miller received therapeutic exercises to develop increase RLE ROM, strength/flexibility and hip stabilization for 57 minutes including:     TKE with strap, hand assist and quad set 15x5"  Quad set 10x5" w/ towel under knee, 10x5" w/o towel under knee, 10x5 w towel under heel  SLR hip flexion with 3" hold 2x10   SLR hip abduction 2x10 with 3" hold   Bridge w/ glut set 2x8 with 3" hold  SAQ 15x5" with quad set and with 1#  LAQ 15x3" with quad set and 3" hold with 1#  Leg press with RLE only 3 plates 15x  Seated knee extension strech with 3# superior to knee joint and 2# inferior to join 3 min  Prone hangs 2 min  Heel slides 2x15 with 5" hold  Bike 7"   Quad stretch 2x30"      Manual therapy: Ej Miller received the following manual therapy techniques for 3 minutes: Patella mobilizations in all directions.    Modalities: Ej Miller received ice to R knee for 10 min post tx session.     Written Home Exercises Provided:   Pt demo good understanding " of the education provided during today's tx sessions, including: prone knee hangs. Ej Miller demonstrated good return demonstration of activities.      Pt. education:  · Posture reeducation, body mechanics, HEP,activity modification/avoidance  · No spiritual or educational barriers to learning provided  · Pt has no cultural, educational or language barriers to learning provided.    Assessment   Pt's R knee flexion has improved as pt was able to ride bike and complete full revolutions.  Pt, however, continued to demonstrate lack of R knee extension.  Pt's HEP was expanded once again in order to promote R knee extension.       Pt will continue to benefit from skilled outpatient physical therapy to address the remaining functional deficits, provide pt/family education, and to maximize pt's level of independence in the home and community environment.      Anticipated barriers to physical therapy: None     ASSESSMENT: PT diagnosis: B knee pain, decreased B LE strength and flexibility.  Patient can benefit from outpatient physical therapy and a home program. Prognosis is good. Will proceed with B LE strengthening exercises to increase R knee extension, increase R knee AROM, promote I community ambulation for patient to return to golfing activities.     Medical necessity is demonstrated by the following IMPAIRMENTS:  - pain  - decreased flexibility  - decreased muscle strength  - impaired function  - decreased recreational sports ability  - decreased exercise ability     GOALS: 6-8 weeks (5/13/17). Pt agrees with goals set.  1. Independent with HEP.  2. Increased B LE strength to 5/5 to promote proper pelvic stability to decrease R knee pain to < or = 2/10 with ADL such as bending his R knee, prolonged standing, and increased walking times.  3. Increase R knee AROM 0 to 120 deg  4. Increased flexibility in B quads, B IT bands, B hip adductors, and B piriformis to promote proper pelvic stability to decrease R  knee pain to < or = 2/10 with adls such as bending his R knee, prolonged standing, and increased walking times.  5. I community ambulation.  6. Patient to achieve CK (at least 40% < 60% impaired, limited or restricted) level on the FOTO Outcomes Measurement System.        PLAN:  Outpatient physical therapy 2 times weekly to include: pt ed, hep, therapeutic exercises, neuromuscular re-education/ balance exercises, joint mobilizations, and modalities prn. Pt may be seen by PTA to carry out plan of care.    · Pt's spiritual, cultural and educational needs considered and pt agreeable to plan of care and goals as stated below:        Plan   Outpatient physical therapy 2 times weekly to include: pt ed, hep, therapeutic exercises, neuromuscular re-education/ balance exercises, joint mobilizations, and modalities prn. Pt may be seen by PTA to carry out plan of care.

## 2017-03-24 ENCOUNTER — CLINICAL SUPPORT (OUTPATIENT)
Dept: REHABILITATION | Facility: HOSPITAL | Age: 77
End: 2017-03-24
Attending: ORTHOPAEDIC SURGERY
Payer: MEDICARE

## 2017-03-24 DIAGNOSIS — M25.561 ACUTE PAIN OF RIGHT KNEE: ICD-10-CM

## 2017-03-24 DIAGNOSIS — M25.661 DECREASED RANGE OF MOTION OF RIGHT KNEE: ICD-10-CM

## 2017-03-24 DIAGNOSIS — R29.898 WEAKNESS OF BOTH LOWER EXTREMITIES: ICD-10-CM

## 2017-03-24 DIAGNOSIS — R26.9 IMPAIRED GAIT: ICD-10-CM

## 2017-03-24 PROCEDURE — 97110 THERAPEUTIC EXERCISES: CPT

## 2017-03-24 PROCEDURE — 97010 HOT OR COLD PACKS THERAPY: CPT

## 2017-03-24 NOTE — PROGRESS NOTES
"                                                    Physical Therapy Progress Note     Name: Ej Miller  Clinic Number: [id number]  Diagnosis:   Encounter Diagnoses   Name Primary?    Decreased range of motion of right knee     Impaired gait     Acute pain of right knee     Weakness of both lower extremities      Physician: [facility], John L. Jr., MD  Treatment Orders: PT Evaluation and Treatment  Past Medical History:   Diagnosis Date    Cataract     Gastric ulcer; benign     Glaucoma     Glaucoma suspect of both eyes     Gout attack     Gout, joint     HTN (hypertension)     Hyperglycemia     Osteoarthritis of knee     bilaterial    Peptic ulcer        Precautions: standard    Evaluation date: 3/13/17  Visit #: 4/20  Authorization period expiration: 12/31/07    Subjective     Pt reports: performing HEP but not on a regular basis.    Pain Scale: before treatment: 2/10 currently; after treatment: NT    Objective   Therapeutic exercise: Ej Miller received therapeutic exercises to develop increase RLE ROM, strength/flexibility and hip stabilization for 57 minutes including:     TKE with strap, hand assist and quad set 15x5"  Quad set 10x5" w/ towel under knee, 10x5" w/o towel under knee, 10x5 w towel under heel  SLR hip flexion with 3" hold 2x10   SLR hip abduction 2x10 with 3" hold   SAQ 15x5" with quad set and with 1#  LAQ 15x3" with quad set and 3" hold with 1#  Bridge w/ glut set 2x10 with 3" hold  Leg press with RLE only 3 plates 10x  Seated knee extension strech with 3# superior to knee joint and 2# inferior to join 3 min  Prone hangs 2x1 min  TKE with red theracord 2x10x5"  Backwards walking in TM with verbal/tactile cues for R TKE ~3 min  Heel slides 2x15 with 5" hold  Bike 5"   Quad stretch 15x5"      Manual therapy: Ej Miller received the following manual therapy techniques for 3 minutes: Patella mobilizations in all directions.    Modalities: Ej Miller " received ice to R knee for 10 min post tx session.     Written Home Exercises Provided:   Pt demo good understanding of the education provided during previous tx sessions. Ej Miller demonstrated good return demonstration of activities.      Pt. education:  · Posture reeducation, body mechanics, HEP,activity modification/avoidance  · No spiritual or educational barriers to learning provided  · Pt has no cultural, educational or language barriers to learning provided.    Assessment   Tx continued to focus on R knee ROM and quad activation.  Tx was expanded once again in order to promote R knee extension.  Pt could benefit from expanding HEP during subsequent tx sessions in order to promote hip stabilization.      Pt will continue to benefit from skilled outpatient physical therapy to address the remaining functional deficits, provide pt/family education, and to maximize pt's level of independence in the home and community environment.      Anticipated barriers to physical therapy: None     ASSESSMENT: PT diagnosis: B knee pain, decreased B LE strength and flexibility.  Patient can benefit from outpatient physical therapy and a home program. Prognosis is good. Will proceed with B LE strengthening exercises to increase R knee extension, increase R knee AROM, promote I community ambulation for patient to return to golfing activities.     Medical necessity is demonstrated by the following IMPAIRMENTS:  - pain  - decreased flexibility  - decreased muscle strength  - impaired function  - decreased recreational sports ability  - decreased exercise ability     GOALS: 6-8 weeks (5/13/17). Pt agrees with goals set.  1. Independent with HEP.  2. Increased B LE strength to 5/5 to promote proper pelvic stability to decrease R knee pain to < or = 2/10 with ADL such as bending his R knee, prolonged standing, and increased walking times.  3. Increase R knee AROM 0 to 120 deg  4. Increased flexibility in B quads, B IT  bands, B hip adductors, and B piriformis to promote proper pelvic stability to decrease R knee pain to < or = 2/10 with adls such as bending his R knee, prolonged standing, and increased walking times.  5. I community ambulation.  6. Patient to achieve CK (at least 40% < 60% impaired, limited or restricted) level on the FOTO Outcomes Measurement System.        PLAN:  Outpatient physical therapy 2 times weekly to include: pt ed, hep, therapeutic exercises, neuromuscular re-education/ balance exercises, joint mobilizations, and modalities prn. Pt may be seen by PTA to carry out plan of care.    · Pt's spiritual, cultural and educational needs considered and pt agreeable to plan of care and goals as stated below:        Plan   Outpatient physical therapy 2 times weekly to include: pt ed, hep, therapeutic exercises, neuromuscular re-education/ balance exercises, joint mobilizations, and modalities prn. Pt may be seen by PTA to carry out plan of care.

## 2017-03-27 ENCOUNTER — CLINICAL SUPPORT (OUTPATIENT)
Dept: REHABILITATION | Facility: HOSPITAL | Age: 77
End: 2017-03-27
Attending: ORTHOPAEDIC SURGERY
Payer: MEDICARE

## 2017-03-27 DIAGNOSIS — M25.661 DECREASED RANGE OF MOTION OF RIGHT KNEE: ICD-10-CM

## 2017-03-27 DIAGNOSIS — R26.9 IMPAIRED GAIT: ICD-10-CM

## 2017-03-27 DIAGNOSIS — R29.898 WEAKNESS OF BOTH LOWER EXTREMITIES: ICD-10-CM

## 2017-03-27 DIAGNOSIS — M25.561 ACUTE PAIN OF RIGHT KNEE: ICD-10-CM

## 2017-03-27 PROCEDURE — 97110 THERAPEUTIC EXERCISES: CPT

## 2017-03-27 PROCEDURE — G8979 MOBILITY GOAL STATUS: HCPCS | Mod: CK

## 2017-03-27 PROCEDURE — G8978 MOBILITY CURRENT STATUS: HCPCS | Mod: CK

## 2017-03-27 NOTE — PROGRESS NOTES
"Name: Ej Miller  Clinic Number: [id number]  Date of Treatment: 3/27/2017  Diagnosis:     ICD-10-CM ICD-9-CM    1. Decreased range of motion of right knee M25.661 719.56    2. Impaired gait R26.9 781.2    3. Acute pain of right knee M25.561 719.46    4. Weakness of both lower extremities R29.898 729.89        Subjective: Ej Miller reports improvement of symptoms.  Patient reports his R knee pain to be 1/10 on a 0-10 scale with 0 being no pain and 10 being the worst pain imaginable.    Objective:  Patient was educated and performed therapy to increase strength, ROM and flexibility with activities as follows:     Ej Miller was educated and performed therapeutic exercises to develop strength, ROM and flexibility for 53 total minutes including:     One on one ther ex x 25 minutes    Patellar and patellar tendon mobilizations x 5'  TKE with strap, hand assist and quad set 15 x 5"  Quad set 30 x 5" with towel under heel  Hamstring stretches 3 x 30"  SLR hip flexion 2 x 10 x 3" with 2#  SLR hip abduction 2 x 10 x 3"   SAQ 2 x 15 x 5" with quad set with 2#  LAQ 2 x 15 x 3" with quad set with 2#  Bridging 20 x 3"     Not performed:  Leg press with RLE only 3 plates 10x  Seated knee extension strech with 3# superior to knee joint and 2# inferior to join 3 min  Prone hangs 2x1 min  TKE with red theracord 2x10x5"  Backwards walking in TM with verbal/tactile cues for R TKE x 5'  Heel slides 2x15 with 5" hold  Quad stretch 15x5"      Functional Limitations  Reports - G Codes    Category:  Mobility   Tool:  FOTO Outcomes Measurement System.   Score:  Patient scored out 45 of 100 on the FOTO Outcomes Measurement System.   Current:   CK at least 40% < 60% impaired, limited or restricted   Goal:   CK at least 40% < 60% impaired, limited or restricted       Written Home Exercises Provided:   No new HEP given today    Assessment:   Pt will continue to benefit from skilled PT intervention. Medical " Necessity is demonstrated by:  Pain limits function of effected part for some activities, Unable to participate fully in daily activities, Requires skilled supervision to complete and progress HEP, Weakness, and gait deviations.    Patient is making good progress towards established goals.    New/Revised goals: continue with current goals   GOALS: 6-8 weeks (5/13/17). Pt agrees with goals set.  1. Independent with HEP.  2. Increased B LE strength to 5/5 to promote proper pelvic stability to decrease R knee pain to < or = 2/10 with ADL such as bending his R knee, prolonged standing, and increased walking times.  3. Increase R knee AROM 0 to 120 deg  4. Increased flexibility in B quads, B IT bands, B hip adductors, and B piriformis to promote proper pelvic stability to decrease R knee pain to < or = 2/10 with adls such as bending his R knee, prolonged standing, and increased walking times.  5. I community ambulation.  6. Patient to achieve CK (at least 40% < 60% impaired, limited or restricted) level on the FOTO Outcomes Measurement System.        Plan:  Continue with established Plan of Care towards PT goals.

## 2017-03-31 ENCOUNTER — CLINICAL SUPPORT (OUTPATIENT)
Dept: REHABILITATION | Facility: HOSPITAL | Age: 77
End: 2017-03-31
Attending: ORTHOPAEDIC SURGERY
Payer: MEDICARE

## 2017-03-31 DIAGNOSIS — M25.561 ACUTE PAIN OF RIGHT KNEE: ICD-10-CM

## 2017-03-31 DIAGNOSIS — M25.661 DECREASED RANGE OF MOTION OF RIGHT KNEE: ICD-10-CM

## 2017-03-31 DIAGNOSIS — R29.898 WEAKNESS OF BOTH LOWER EXTREMITIES: ICD-10-CM

## 2017-03-31 DIAGNOSIS — R26.9 IMPAIRED GAIT: ICD-10-CM

## 2017-03-31 PROCEDURE — 97110 THERAPEUTIC EXERCISES: CPT

## 2017-03-31 NOTE — PROGRESS NOTES
"Name: Ej Miller  Clinic Number: [id number]  Date of Treatment: 3/31/2017  Diagnosis:     ICD-10-CM ICD-9-CM    1. Decreased range of motion of right knee M25.661 719.56    2. Impaired gait R26.9 781.2    3. Acute pain of right knee M25.561 719.46    4. Weakness of both lower extremities R29.898 729.89        Subjective: Ej Miller reports improvement of symptoms.  Patient reports his R knee pain to be 1/10 on a 0-10 scale with 0 being no pain and 10 being the worst pain imaginable.  Played golf on Tuesday and felt fatigue after 18 holes but min knee pain.       Objective:  Patient was educated and performed therapy to increase strength, ROM and flexibility with activities as follows:      Ej Miller was educated and performed therapeutic exercises to develop strength, ROM and flexibility for 65 total minutes including:     One on one ther ex x 40 minutes    Patellar and patellar tendon mobilizations x 5'  TKE with strap, hand assist and quad set 15 x 5"  SLR hip flexion 2 x 10 x 3" with 2#  SLR hip abduction 2 x 10 x 3"   Quad set 30 x 5" with towel under heel  SAQ 2 x 15 x 5" with quad set with 2#  LAQ 2 x 15 x 3" with quad set with 2#  Bridging 20 x 5"   Hamstring stretches 3 x 30"   TKE with red theracord 2x10x5"  Seated knee extension strech with 3# superior to knee joint and 2# inferior to join 3 min  Prone hangs 2x1 min  Backwards walking in TM with verbal/tactile cues for R TKE x 5'  Heel slides 2x15 with 5" hold    Not performed:  Leg press with RLE only 3 plates 10x  Quad stretch 15x5"      Functional Limitations  Reports - G Codes    Category:  Mobility   Tool:  FOTO Outcomes Measurement System.   Score:  Patient scored out 45 of 100 on the FOTO Outcomes Measurement System.   Current:   CK at least 40% < 60% impaired, limited or restricted   Goal:   CK at least 40% < 60% impaired, limited or restricted       Written Home Exercises Provided:   No new HEP given " today    Assessment:   Pt could benefit from exercises to increase R quad/VMO activation exercises and R knee extension.  Pt will continue to benefit from skilled PT intervention. Medical Necessity is demonstrated by:  Pain limits function of effected part for some activities, Unable to participate fully in daily activities, Requires skilled supervision to complete and progress HEP, Weakness, and gait deviations.    Patient is making good progress towards established goals.    New/Revised goals: continue with current goals   GOALS: 6-8 weeks (5/13/17). Pt agrees with goals set.  1. Independent with HEP.  2. Increased B LE strength to 5/5 to promote proper pelvic stability to decrease R knee pain to < or = 2/10 with ADL such as bending his R knee, prolonged standing, and increased walking times.  3. Increase R knee AROM 0 to 120 deg  4. Increased flexibility in B quads, B IT bands, B hip adductors, and B piriformis to promote proper pelvic stability to decrease R knee pain to < or = 2/10 with adls such as bending his R knee, prolonged standing, and increased walking times.  5. I community ambulation.  6. Patient to achieve CK (at least 40% < 60% impaired, limited or restricted) level on the FOTO Outcomes Measurement System.        Plan:  Continue with established Plan of Care towards PT goals.

## 2017-04-03 ENCOUNTER — HOSPITAL ENCOUNTER (OUTPATIENT)
Dept: RADIOLOGY | Facility: HOSPITAL | Age: 77
Discharge: HOME | End: 2017-04-03
Attending: ORTHOPAEDIC SURGERY
Payer: MEDICARE

## 2017-04-03 ENCOUNTER — OFFICE VISIT (OUTPATIENT)
Dept: ORTHOPEDICS | Facility: CLINIC | Age: 77
End: 2017-04-03
Payer: MEDICARE

## 2017-04-03 VITALS — BODY MASS INDEX: 29.43 KG/M2 | HEIGHT: 71 IN | WEIGHT: 210.19 LBS

## 2017-04-03 DIAGNOSIS — M25.561 RIGHT KNEE PAIN, UNSPECIFIED CHRONICITY: Primary | ICD-10-CM

## 2017-04-03 DIAGNOSIS — M25.561 RIGHT KNEE PAIN, UNSPECIFIED CHRONICITY: ICD-10-CM

## 2017-04-03 PROCEDURE — 99024 POSTOP FOLLOW-UP VISIT: CPT | Mod: S$GLB,,, | Performed by: ORTHOPAEDIC SURGERY

## 2017-04-03 PROCEDURE — 99999 PR PBB SHADOW E&M-EST. PATIENT-LVL III: CPT | Mod: PBBFAC,,, | Performed by: ORTHOPAEDIC SURGERY

## 2017-04-03 RX ORDER — DORZOLAMIDE HYDROCHLORIDE AND TIMOLOL MALEATE 20; 5 MG/ML; MG/ML
SOLUTION/ DROPS OPHTHALMIC
COMMUNITY
Start: 2017-03-13

## 2017-04-03 NOTE — MR AVS SNAPSHOT
[facility]  [address]  [facility] [address]  Phone: [phone]                  Ej Miller   4/3/2017 10:00 AM   Office Visit    Description:  Male : 1940   Provider:  Wagner Ayala MD   Department:  [facility]           Reason for Visit     Post-op Evaluation           Diagnoses this Visit        Comments    Right knee pain, unspecified chronicity    -  Primary            To Do List           Future Appointments        Provider Department Dept Phone    5/15/2017 10:30 AM Wagner Ayala MD [facility] [phone]      Goals (5 Years of Data)     None      [facility]     [facility] On Call Nurse Care Line -  Assistance  Unless otherwise directed by your provider, please contact [facility], our nurse care line that is available for  assistance.     Registered nurses in the [facility] On Call Center provide: appointment scheduling, clinical advisement, health education, and other advisory services.  Call: [phone] (toll free)               Medications           Message regarding Medications     Verify the changes and/or additions to your medication regime listed below are the same as discussed with your clinician today.  If any of these changes or additions are incorrect, please notify your healthcare provider.             Verify that the below list of medications is an accurate representation of the medications you are currently taking.  If none reported, the list may be blank. If incorrect, please contact your healthcare provider. Carry this list with you in case of emergency.           Current Medications     allopurinol (ZYLOPRIM) 100 MG tablet TAKE 2 TABLETS ONE TIME DAILY    amlodipine (NORVASC) 5 MG tablet TAKE 1 TABLET ONE TIME DAILY    docusate sodium (COLACE) 100 MG capsule Take 1 capsule (100 mg total) by mouth 2 (two) times daily as needed for Constipation.    dorzolamide-timolol 2-0.5% (COSOPT) 22.3-6.8  "mg/mL ophthalmic solution     glucosamine-chondroitin 500-400 mg tablet Take 1 tablet by mouth 3 (three) times daily.    latanoprost (XALATAN) 0.005 % ophthalmic solution Place 1 drop into both eyes once daily.    losartan (COZAAR) 100 MG tablet TAKE 1 TABLET ONE TIME DAILY    metoprolol succinate (TOPROL-XL) 100 MG 24 hr tablet TAKE 1 TABLET ONE TIME DAILY  (NEED  APPOINTMENT)    MULTIVIT-MIN/FA/LYCOPEN/LUTEIN (MEN 50 PLUS MULTIVITAMIN ORAL) Take by mouth.    naproxen sodium (ANAPROX) 220 MG tablet Take 220 mg by mouth 2 (two) times daily with meals.    ondansetron (ZOFRAN) 8 MG tablet Take 1 tablet (8 mg total) by mouth every 12 (twelve) hours as needed for Nausea.    oxycodone-acetaminophen (PERCOCET)  mg per tablet Take 1 tablet by mouth every 4 (four) hours as needed for Pain.    aspirin 325 MG tablet Take 1 tablet (325 mg total) by mouth 2 (two) times daily.    colchicine 0.6 mg tablet Take 1 tablet (0.6 mg total) by mouth 2 (two) times daily.    hydrocortisone 2.5 % cream Apply topically 2 (two) times daily.    triamcinolone acetonide 0.1% (KENALOG) 0.1 % cream Apply topically 2 (two) times daily.           Clinical Reference Information           Your Vitals Were     Height Weight BMI          5' 11" (1.803 m) 95.4 kg (210 lb 3.3 oz) 29.32 kg/m2        Allergies as of 4/3/2017     Ketoconazole      Immunizations Administered on Date of Encounter - 4/3/2017     None      Language Assistance Services     ATTENTION: Language assistance services are available, free of charge. Please call [phone].      ATENCIÓN: Si peter yesika, tiene a naik disposición servicios gratuitos de asistencia lingüística. Llame al [phone].     SHAWN Ý: N?u b?n nói Ti?ng Vi?t, có các d?ch v? h? tr? ngôn ng? mi?n phí dành cho b?n. G?i s? [phone].         [facility] complies with applicable Federal civil rights laws and does not discriminate on the basis of race, color, national origin, age, disability, " or sex.

## 2017-05-15 ENCOUNTER — OFFICE VISIT (OUTPATIENT)
Dept: ORTHOPEDICS | Facility: CLINIC | Age: 77
End: 2017-05-15
Payer: MEDICARE

## 2017-05-15 ENCOUNTER — HOSPITAL ENCOUNTER (OUTPATIENT)
Dept: RADIOLOGY | Facility: HOSPITAL | Age: 77
Discharge: HOME | End: 2017-05-15
Attending: ORTHOPAEDIC SURGERY
Payer: MEDICARE

## 2017-05-15 VITALS — BODY MASS INDEX: 29.48 KG/M2 | WEIGHT: 210.56 LBS | HEIGHT: 71 IN

## 2017-05-15 DIAGNOSIS — M17.0 BILATERAL PRIMARY OSTEOARTHRITIS OF KNEE: Primary | ICD-10-CM

## 2017-05-15 DIAGNOSIS — M25.561 RIGHT KNEE PAIN, UNSPECIFIED CHRONICITY: ICD-10-CM

## 2017-05-15 PROCEDURE — 73560 X-RAY EXAM OF KNEE 1 OR 2: CPT | Mod: 26,RT,, | Performed by: RADIOLOGY

## 2017-05-15 PROCEDURE — 99024 POSTOP FOLLOW-UP VISIT: CPT | Mod: S$GLB,,, | Performed by: ORTHOPAEDIC SURGERY

## 2017-05-15 PROCEDURE — 99999 PR PBB SHADOW E&M-EST. PATIENT-LVL II: CPT | Mod: PBBFAC,,, | Performed by: ORTHOPAEDIC SURGERY

## 2017-05-15 PROCEDURE — 73560 X-RAY EXAM OF KNEE 1 OR 2: CPT | Mod: TC,RT

## 2017-05-15 RX ORDER — NAPROXEN SODIUM 220 MG/1
81 TABLET, FILM COATED ORAL DAILY
COMMUNITY

## 2017-05-15 RX ORDER — DICLOFENAC SODIUM 10 MG/G
2 GEL TOPICAL 4 TIMES DAILY
Qty: 1 TUBE | Refills: 6 | Status: SHIPPED | OUTPATIENT
Start: 2017-05-15 | End: 2019-05-16

## 2017-05-15 NOTE — MR AVS SNAPSHOT
[facility] - [facility]oped[facility]  [address]  [facility] [address]  Phone: [phone]                  Ej Miller   5/15/2017 10:30 AM   Office Visit    Description:  Male : 1940   Provider:  Wagner Ayala MD   Department:  [facility]           Reason for Visit     Post-op Evaluation           Diagnoses this Visit        Comments    Bilateral primary osteoarthritis of knee    -  Primary     Right knee pain, unspecified chronicity                To Do List           Future Appointments        Provider Department Dept Phone    2017 10:30 AM Wagner Ayala MD [facility] [phone]      Goals (5 Years of Data)     None      Follow-Up and Disposition     Return in about 3 months (around 8/15/2017).    Follow-up and Disposition History       These Medications        Disp Refills Start End    diclofenac sodium (VOLTAREN) 1 % Gel 1 Tube 6 5/15/2017 2017    Apply 2 g topically 4 (four) times daily. Apply to painful area up to four times a day. - Topical    Pharmacy: [facility] - [address] Ph #: [phone]         [facility]     [facility] On Call Nurse Care Line -  Assistance  Unless otherwise directed by your provider, please contact [facility], our nurse care line that is available for  assistance.     Registered nurses in the [facility] Center provide: appointment scheduling, clinical advisement, health education, and other advisory services.  Call: [phone] (toll free)               Medications           Message regarding Medications     Verify the changes and/or additions to your medication regime listed below are the same as discussed with your clinician today.  If any of these changes or additions are incorrect, please notify your healthcare provider.        START taking these NEW medications        Refills    diclofenac sodium (VOLTAREN) 1 % Gel 6    Sig: Apply 2  g topically 4 (four) times daily. Apply to painful area up to four times a day.    Class: Normal    Route: Topical           Verify that the below list of medications is an accurate representation of the medications you are currently taking.  If none reported, the list may be blank. If incorrect, please contact your healthcare provider. Carry this list with you in case of emergency.           Current Medications     allopurinol (ZYLOPRIM) 100 MG tablet TAKE 2 TABLETS ONE TIME DAILY    amlodipine (NORVASC) 5 MG tablet TAKE 1 TABLET ONE TIME DAILY    aspirin 81 MG Chew Take 81 mg by mouth once daily.    docusate sodium (COLACE) 100 MG capsule Take 1 capsule (100 mg total) by mouth 2 (two) times daily as needed for Constipation.    dorzolamide-timolol 2-0.5% (COSOPT) 22.3-6.8 mg/mL ophthalmic solution     glucosamine-chondroitin 500-400 mg tablet Take 1 tablet by mouth 3 (three) times daily.    latanoprost (XALATAN) 0.005 % ophthalmic solution Place 1 drop into both eyes once daily.    losartan (COZAAR) 100 MG tablet TAKE 1 TABLET ONE TIME DAILY    metoprolol succinate (TOPROL-XL) 100 MG 24 hr tablet TAKE 1 TABLET ONE TIME DAILY  (NEED  APPOINTMENT)    MULTIVIT-MIN/FA/LYCOPEN/LUTEIN (MEN 50 PLUS MULTIVITAMIN ORAL) Take by mouth.    naproxen sodium (ANAPROX) 220 MG tablet Take 220 mg by mouth 2 (two) times daily with meals.    ondansetron (ZOFRAN) 8 MG tablet Take 1 tablet (8 mg total) by mouth every 12 (twelve) hours as needed for Nausea.    oxycodone-acetaminophen (PERCOCET)  mg per tablet Take 1 tablet by mouth every 4 (four) hours as needed for Pain.    colchicine 0.6 mg tablet Take 1 tablet (0.6 mg total) by mouth 2 (two) times daily.    diclofenac sodium (VOLTAREN) 1 % Gel Apply 2 g topically 4 (four) times daily. Apply to painful area up to four times a day.    hydrocortisone 2.5 % cream Apply topically 2 (two) times daily.    triamcinolone acetonide 0.1% (KENALOG) 0.1 % cream Apply topically 2 (two) times  "daily.           Clinical Reference Information           Your Vitals Were     Height Weight BMI          5' 11" (1.803 m) 95.5 kg (210 lb 8.6 oz) 29.36 kg/m2        Allergies as of 5/15/2017     Ketoconazole      Immunizations Administered on Date of Encounter - 5/15/2017     None      Orders Placed During Today's Visit     Future Labs/Procedures Expected by Expires    X-Ray Knee 1 or 2 View Right  5/15/2017 5/15/2018      Language Assistance Services     ATTENTION: Language assistance services are available, free of charge. Please call [phone].      ATENCIÓN: Si habla español, tiene a naik disposición servicios gratuitos de asistencia lingüística. Llame al [phone].     CHÚ Ý: N?u b?n nói Ti?ng Vi?t, có các d?ch v? h? tr? ngôn ng? mi?n phí dành cho b?n. G?i s? [phone].         [facility] complies with applicable Federal civil rights laws and does not discriminate on the basis of race, color, national origin, age, disability, or sex.        "

## 2017-05-15 NOTE — PROGRESS NOTES
Ej Miller presents for post-operative evaluation.  He is status-post  tka .  The wound is healing well with no signs of erythema or warmth.  There is no drainage.  No clinical signs or symptoms of infection are present.    ROM 0-125.    Post-operative radiographs were obtained today and show satisfactory position of the prosthesis.    We will continue post-operative physical therapy, and the patient was instructed to continue anticoagulation for a total of 1 month post-operatively.    Follow-up in 12 weeks.

## 2017-05-31 ENCOUNTER — TELEPHONE (OUTPATIENT)
Dept: INTERNAL MEDICINE | Facility: CLINIC | Age: 77
End: 2017-05-31

## 2017-05-31 NOTE — TELEPHONE ENCOUNTER
----- Message from Nicolasa Braun sent at 5/31/2017  6:10 AM CDT -----  Contact: pt  Pt called in said he need laura to call him at [phone]

## 2017-05-31 NOTE — TELEPHONE ENCOUNTER
She is being treated for colitis, which can take days to see progress.  I agree with their plan to date.

## 2017-05-31 NOTE — TELEPHONE ENCOUNTER
----- Message from Nicolasa Braun sent at 5/31/2017  6:10 AM CDT -----  Contact: pt  Pt called in said he need laura to call him at [phone]

## 2017-05-31 NOTE — TELEPHONE ENCOUNTER
Pt would like you to look into wife Cheryl's case she has been in the hospital several days and not getting any better.

## 2017-06-13 ENCOUNTER — TELEPHONE (OUTPATIENT)
Dept: ORTHOPEDICS | Facility: CLINIC | Age: 77
End: 2017-06-13

## 2017-07-31 DIAGNOSIS — I10 ESSENTIAL HYPERTENSION: ICD-10-CM

## 2017-07-31 RX ORDER — LOSARTAN POTASSIUM 100 MG/1
TABLET ORAL
Qty: 90 TABLET | Refills: 3 | Status: SHIPPED | OUTPATIENT
Start: 2017-07-31 | End: 2018-05-16 | Stop reason: SDUPTHER

## 2017-07-31 NOTE — TELEPHONE ENCOUNTER
----- Message from Nayana Lucia sent at 7/31/2017 12:41 PM CDT -----  Contact: Self call Home: [phone]   RX request - refill or new RX.  Is this a refill or new RX:  Refill  RX name and strength: Losartan (COZAAR) 100 MG tablet  Directions:   Is this a 30 day or 90 day RX:  90 days  Pharmacy name and phone #: [facility] [phone] (Phone)  [phone] (Fax)  Comments:

## 2017-08-03 ENCOUNTER — TELEPHONE (OUTPATIENT)
Dept: INTERNAL MEDICINE | Facility: CLINIC | Age: 77
End: 2017-08-03

## 2017-08-03 NOTE — TELEPHONE ENCOUNTER
----- Message from Avis Sow sent at 8/3/2017  1:22 PM CDT -----  Contact: pt [phone]  Pt request refill for losartan (COZAAR) 100 MG tablet. Pt can be reached at [phone] .            [facility] - [place], [place] - [address]  [address] [address]  Phone: [phone] Fax: [phone]

## 2017-08-03 NOTE — TELEPHONE ENCOUNTER
----- Message from Avis Sow sent at 8/3/2017  1:22 PM CDT -----  Contact: pt [phone]  Pt request refill for losartan (COZAAR) 100 MG tablet. Pt can be reached at [phone] .            [facility] - [place], [place] - [address]  [address] [address]  Phone: [phone] Fax: [phone]

## 2017-08-14 ENCOUNTER — OFFICE VISIT (OUTPATIENT)
Dept: ORTHOPEDICS | Facility: CLINIC | Age: 77
End: 2017-08-14
Payer: MEDICARE

## 2017-08-14 ENCOUNTER — OFFICE VISIT (OUTPATIENT)
Dept: INTERNAL MEDICINE | Facility: CLINIC | Age: 77
End: 2017-08-14
Payer: MEDICARE

## 2017-08-14 VITALS
DIASTOLIC BLOOD PRESSURE: 60 MMHG | SYSTOLIC BLOOD PRESSURE: 122 MMHG | OXYGEN SATURATION: 97 % | HEIGHT: 71 IN | BODY MASS INDEX: 29.14 KG/M2 | HEART RATE: 65 BPM | WEIGHT: 208.13 LBS

## 2017-08-14 VITALS — HEIGHT: 71 IN | WEIGHT: 208.75 LBS | BODY MASS INDEX: 29.23 KG/M2

## 2017-08-14 DIAGNOSIS — M25.561 CHRONIC PAIN OF BOTH KNEES: Primary | ICD-10-CM

## 2017-08-14 DIAGNOSIS — Z23 NEED FOR PNEUMOCOCCAL VACCINATION: ICD-10-CM

## 2017-08-14 DIAGNOSIS — Z86.010 HISTORY OF COLON POLYPS: ICD-10-CM

## 2017-08-14 DIAGNOSIS — Z00.00 ENCOUNTER FOR PREVENTIVE HEALTH EXAMINATION: Primary | ICD-10-CM

## 2017-08-14 DIAGNOSIS — M25.562 CHRONIC PAIN OF BOTH KNEES: Primary | ICD-10-CM

## 2017-08-14 DIAGNOSIS — G62.1 NEUROPATHY, ALCOHOLIC: ICD-10-CM

## 2017-08-14 DIAGNOSIS — F10.10 ALCOHOL ABUSE: ICD-10-CM

## 2017-08-14 DIAGNOSIS — I10 ESSENTIAL HYPERTENSION: ICD-10-CM

## 2017-08-14 DIAGNOSIS — Z96.651 STATUS POST TOTAL RIGHT KNEE REPLACEMENT: ICD-10-CM

## 2017-08-14 DIAGNOSIS — G89.29 CHRONIC PAIN OF BOTH KNEES: Primary | ICD-10-CM

## 2017-08-14 DIAGNOSIS — M17.0 PRIMARY OSTEOARTHRITIS OF BOTH KNEES: ICD-10-CM

## 2017-08-14 PROCEDURE — 90670 PCV13 VACCINE IM: CPT | Mod: S$GLB,,, | Performed by: NURSE PRACTITIONER

## 2017-08-14 PROCEDURE — 99024 POSTOP FOLLOW-UP VISIT: CPT | Mod: S$GLB,,, | Performed by: ORTHOPAEDIC SURGERY

## 2017-08-14 PROCEDURE — 99499 UNLISTED E&M SERVICE: CPT | Mod: S$GLB,,, | Performed by: NURSE PRACTITIONER

## 2017-08-14 PROCEDURE — 99999 PR PBB SHADOW E&M-EST. PATIENT-LVL IV: CPT | Mod: PBBFAC,,, | Performed by: NURSE PRACTITIONER

## 2017-08-14 PROCEDURE — G0009 ADMIN PNEUMOCOCCAL VACCINE: HCPCS | Mod: S$GLB,,, | Performed by: NURSE PRACTITIONER

## 2017-08-14 PROCEDURE — G0439 PPPS, SUBSEQ VISIT: HCPCS | Mod: S$GLB,,, | Performed by: NURSE PRACTITIONER

## 2017-08-14 PROCEDURE — 99999 PR PBB SHADOW E&M-EST. PATIENT-LVL II: CPT | Mod: PBBFAC,,, | Performed by: ORTHOPAEDIC SURGERY

## 2017-08-14 NOTE — PATIENT INSTRUCTIONS
Counseling and Referral of Other Preventative  (Italic type indicates deductible and co-insurance are waived)    Patient Name: Ej Miller  Today's Date: 8/14/2017      SERVICE LIMITATIONS RECOMMENDATION    Vaccines    · Pneumococcal (once after 65)    · Influenza (annually)    · Hepatitis B (if medium/high risk)    · Prevnar 13      Hepatitis B medium/high risk factors:       - End-stage renal disease       - Hemophiliacs who received Factor VII or         IX concentrates       - Clients of institutions for the mentally             retarded       - Persons who live in the same house as          a HepB carrier       - Homosexual men       - Illicit injectable drug abusers     Pneumococcal: Done, no repeat necessary     Influenza: Done, repeat in one year     Hepatitis B: N/A     Prevnar 13: Scheduled - see appointments    Prostate cancer screening (annually to age 75)     Prostate specific antigen (PSA) Shared decision making with Provider. Sometimes a co-pay may be required if the patient decides to have this test. The USPSTF no longer recommends prostate cancer screening routinely in medicine: not to follow    Colorectal cancer screening (to age 75)    · Fecal occult blood test (annual)  · Flexible sigmoidoscopy (5y)  · Screening colonoscopy (10y)  · Barium enema   N/A    Diabetes self-management training (no USPSTF recommendations)  Requires referral by treating physician for patient with diabetes or renal disease. 10 hours of initial DSMT sessions of no less than 30 minutes each in a continuous 12-month period. 2 hours of follow-up DSMT in subsequent years.  N/A    Glaucoma screening (no USPSTF recommendation)  Diabetes mellitus, family history   , age 50 or over    American, age 65 or over  Done this year, repeat every year    Medical nutrition therapy for diabetes or renal disease (no recommended schedule)  Requires referral by treating physician for patient with diabetes or renal  disease or kidney transplant within the past 3 years.  Can be provided in same year as diabetes self-management training (DSMT), and CMS recommends medical nutrition therapy take place after DSMT. Up to 3 hours for initial year and 2 hours in subsequent years.  N/A    Cardiovascular screening blood tests (every 5 years)  · Fasting lipid panel  Order as a panel if possible  Done this year, repeat every year    Diabetes screening tests (at least every 3 years, Medicare covers annually or at 6-month intervals for prediabetic patients)  · Fasting blood sugar (FBS) or glucose tolerance test (GTT)  Patient must be diagnosed with one of the following:       - Hypertension       - Dyslipidemia       - Obesity (BMI 30kg/m2)       - Previous elevated impaired FBS or GTT       ... or any two of the following:       - Overweight (BMI 25 but <30)       - Family history of diabetes       - Age 65 or older       - History of gestational diabetes or birth of baby weighing more than 9 pounds  N/A    Abdominal aortic aneurysm screening (once)  · Sonogram   Limited to patients who meet one of the following criteria:       - Men who are 65-75 years old and have smoked more than 100 cigarette in their lifetime       - Anyone with a family history of abdominal aortic aneurysm       - Anyone recommended for screening by the USPSTF  N/A    HIV screening (annually for increased risk patients)  · HIV-1 and HIV-2 by EIA, or BATOOL, rapid antibody test or oral mucosa transudate  Patients must be at increased risk for HIV infection per USPSTF guidelines or pregnant. Tests covered annually for patient at increased risk or as requested by the patient. Pregnant patients may receive up to 3 tests during pregnancy.  Risks discussed, screening is not recommended    Smoking cessation counseling (up to 8 sessions per year)  Patients must be asymptomatic of tobacco-related conditions to receive as a preventative service.  Non-smoker    Subsequent annual  wellness visit  At least 12 months since last AWV  Return in one year     The following information is provided to all patients.  This information is to help you find resources for any of the problems found today that may be affecting your health:                Living healthy guide: www.[facility].[place].[facility]      Understanding Diabetes: www.diabetes.org      Eating healthy: www.cdc.gov/healthyweight      CDC home safety checklist: www.cdc.gov/steadi/patient.html      Agency on Aging: www.goea.[place].[facility]      Alcoholics anonymous (AA): www.aa.org      Physical Activity: www.anthony.nih.gov/uc8boqu      Tobacco use: www.quitwithusla.org

## 2017-08-14 NOTE — PROGRESS NOTES
Name &  verified. Allergies reviewed. Pt tolerated injection well. Pt advised to remain in the clinic for 15 mins and report any adverse reactions.

## 2017-08-14 NOTE — PROGRESS NOTES
Ej Miller presents for post-operative evaluation.  He is status-post  tka.  The wound is healing well with no signs of erythema or warmth.  There is no drainage.  No clinical signs or symptoms of infection are present. .  ROM 0-110.    Post-operative radiographs were obtained today and show satisfactory position of the prosthesis.    We will continue post-operative physical therapy.    Follow-up in 6 weeks.

## 2017-08-15 PROBLEM — M25.661 DECREASED RANGE OF MOTION OF RIGHT KNEE: Status: RESOLVED | Noted: 2017-03-13 | Resolved: 2017-08-15

## 2017-08-15 PROBLEM — M25.562 PAIN IN BOTH KNEES: Status: RESOLVED | Noted: 2017-02-15 | Resolved: 2017-08-15

## 2017-08-15 PROBLEM — R29.898 WEAKNESS OF BOTH LOWER EXTREMITIES: Status: RESOLVED | Noted: 2017-03-13 | Resolved: 2017-08-15

## 2017-08-15 PROBLEM — R26.9 IMPAIRED GAIT: Status: RESOLVED | Noted: 2017-03-13 | Resolved: 2017-08-15

## 2017-08-15 PROBLEM — M25.561 ACUTE PAIN OF RIGHT KNEE: Status: RESOLVED | Noted: 2017-03-13 | Resolved: 2017-08-15

## 2017-08-15 PROBLEM — M25.561 PAIN IN BOTH KNEES: Status: RESOLVED | Noted: 2017-02-15 | Resolved: 2017-08-15

## 2017-08-15 NOTE — PROGRESS NOTES
"Ej Miller presented for a  Medicare AWV and comprehensive Health Risk Assessment today. The following components were reviewed and updated:    · Medical history  · Family History  · Social history  · Allergies and Current Medications  · Health Risk Assessment  · Health Maintenance  · Care Team     ** See Completed Assessments for Annual Wellness Visit within the encounter summary.**       The following assessments were completed:  · Living Situation  · CAGE  · Depression Screening  · Timed Get Up and Go  · Whisper Test  · Cognitive Function Screening  · Nutrition Screening  · ADL Screening  · PAQ Screening    Vitals:    08/14/17 0920   BP: 122/60   Pulse: 65   SpO2: 97%   Weight: 94.4 kg (208 lb 1.8 oz)   Height: 5' 11" (1.803 m)     Body mass index is 29.03 kg/m².  Physical Exam   Constitutional: He is oriented to person, place, and time. He appears well-developed.   overweight   HENT:   Head: Normocephalic and atraumatic.   Nose: Nose normal.   Eyes: Conjunctivae and EOM are normal.   Neck: Normal range of motion. Neck supple.   Cardiovascular: Normal rate, regular rhythm, normal heart sounds and intact distal pulses.    Pulmonary/Chest: Effort normal and breath sounds normal.   Musculoskeletal: Normal range of motion.   Neurological: He is alert and oriented to person, place, and time.   Skin: Skin is warm and dry.   Psychiatric: He has a normal mood and affect. His behavior is normal. Judgment and thought content normal.   Nursing note and vitals reviewed.        Diagnoses and health risks identified today and associated recommendations/orders:    1. Encounter for preventive health examination  Assessment performed. Health maintenance updated. Chart review completed.    2. Need for pneumococcal vaccination  - (In Office Administered) Pneumococcal Conjugate Vaccine (13 Valent) (IM)    3. Neuropathy, alcoholic  Cage score 2. AA number provided. Followed by PCP.    4. Alcohol abuse  Cage score 2. AA number " provided. Followed by PCP.    5. History of colon polyps  Last colonoscopy 3/23/2015. Repeat due in 3 years. Followed by PCP.    6. Primary osteoarthritis of both knees  Stable with medication. Followed by PCP.    7. 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    8. Essential hypertension  Stable with medication. Followed by PCP.      Provided Ej with a 5-10 year written screening schedule and personal prevention plan. Recommendations were developed using the USPSTF age appropriate recommendations. Education, counseling, and referrals were provided as needed. After Visit Summary printed and given to patient which includes a list of additional screenings\tests needed.    Return for follow up with Primary Care Provider as instructed, ;sooner if problems, HRA in 1 year.    SHAWN Schumacher

## 2017-10-14 DIAGNOSIS — I10 ESSENTIAL HYPERTENSION: ICD-10-CM

## 2017-10-16 RX ORDER — AMLODIPINE BESYLATE 5 MG/1
TABLET ORAL
Qty: 90 TABLET | Refills: 3 | Status: SHIPPED | OUTPATIENT
Start: 2017-10-16 | End: 2018-07-31 | Stop reason: SDUPTHER

## 2017-11-02 RX ORDER — MUPIROCIN 20 MG/G
OINTMENT TOPICAL
COMMUNITY
Start: 2017-07-26 | End: 2018-05-30

## 2017-11-03 ENCOUNTER — OFFICE VISIT (OUTPATIENT)
Dept: INTERNAL MEDICINE | Facility: CLINIC | Age: 77
End: 2017-11-03
Payer: MEDICARE

## 2017-11-03 VITALS
OXYGEN SATURATION: 96 % | HEART RATE: 60 BPM | DIASTOLIC BLOOD PRESSURE: 80 MMHG | WEIGHT: 209 LBS | SYSTOLIC BLOOD PRESSURE: 138 MMHG | HEIGHT: 71 IN | BODY MASS INDEX: 29.26 KG/M2

## 2017-11-03 DIAGNOSIS — I10 ESSENTIAL HYPERTENSION: Primary | ICD-10-CM

## 2017-11-03 DIAGNOSIS — J30.9 CHRONIC ALLERGIC RHINITIS, UNSPECIFIED SEASONALITY, UNSPECIFIED TRIGGER: ICD-10-CM

## 2017-11-03 PROCEDURE — 99214 OFFICE O/P EST MOD 30 MIN: CPT | Mod: S$GLB,,, | Performed by: INTERNAL MEDICINE

## 2017-11-03 PROCEDURE — 99499 UNLISTED E&M SERVICE: CPT | Mod: S$GLB,,, | Performed by: INTERNAL MEDICINE

## 2017-11-03 PROCEDURE — 99999 PR PBB SHADOW E&M-EST. PATIENT-LVL III: CPT | Mod: PBBFAC,,, | Performed by: INTERNAL MEDICINE

## 2017-11-03 RX ORDER — LATANOPROST 50 UG/ML
SOLUTION/ DROPS OPHTHALMIC
COMMUNITY
Start: 2017-09-18

## 2017-11-03 RX ORDER — FLUTICASONE PROPIONATE 50 MCG
2 SPRAY, SUSPENSION (ML) NASAL DAILY
Qty: 3 BOTTLE | Refills: 3 | Status: SHIPPED | OUTPATIENT
Start: 2017-11-03 | End: 2017-12-03

## 2017-11-06 DIAGNOSIS — I10 ESSENTIAL HYPERTENSION: ICD-10-CM

## 2017-11-07 RX ORDER — METOPROLOL SUCCINATE 100 MG/1
TABLET, EXTENDED RELEASE ORAL
Qty: 90 TABLET | Refills: 3 | Status: SHIPPED | OUTPATIENT
Start: 2017-11-07 | End: 2018-07-31 | Stop reason: SDUPTHER

## 2017-11-08 NOTE — PROGRESS NOTES
Subjective:       Patient ID: Ej Miller is a 77 y.o. male.    Chief Complaint: Follow-up    Hypertension   This is a chronic problem. The problem is unchanged. The problem is controlled. Pertinent negatives include no chest pain or shortness of breath. The current treatment provides significant improvement. There are no compliance problems.      Review of Systems   HENT: Positive for congestion, postnasal drip, rhinorrhea and sneezing.    Respiratory: Negative for shortness of breath.    Cardiovascular: Negative for chest pain.       Objective:      Physical Exam   Constitutional: He is oriented to person, place, and time. He appears well-developed and well-nourished. No distress.   HENT:   Head: Normocephalic and atraumatic.   Mouth/Throat: Oropharynx is clear and moist.   Eyes: Conjunctivae are normal. Pupils are equal, round, and reactive to light.   Neck: Normal range of motion. Neck supple.   Cardiovascular: Normal rate, regular rhythm and normal heart sounds.    Pulmonary/Chest: Effort normal and breath sounds normal. He has no wheezes.   Abdominal: Soft. Bowel sounds are normal. There is no tenderness.   Musculoskeletal: Normal range of motion. He exhibits no edema or tenderness.   Neurological: He is alert and oriented to person, place, and time. No cranial nerve deficit.   Skin: No erythema.   Psychiatric: He has a normal mood and affect.   Vitals reviewed.      Assessment:       1. Essential hypertension    2. Chronic allergic rhinitis, unspecified seasonality, unspecified trigger        Plan:       Ej was seen today for follow-up.    Diagnoses and all orders for this visit:    Essential hypertension    Chronic allergic rhinitis, unspecified seasonality, unspecified trigger  -     fluticasone (FLONASE) 50 mcg/actuation nasal spray; 2 sprays by Each Nare route once daily.        Return in about 6 months (around 5/3/2018) for PHYSICAL EXAM, WITH LAB BEFORE.

## 2018-01-08 DIAGNOSIS — M10.9 GOUTY ARTHRITIS: ICD-10-CM

## 2018-01-08 RX ORDER — ALLOPURINOL 100 MG/1
200 TABLET ORAL DAILY
Qty: 180 TABLET | Refills: 3 | Status: SHIPPED | OUTPATIENT
Start: 2018-01-08 | End: 2018-01-11 | Stop reason: SDUPTHER

## 2018-01-08 NOTE — TELEPHONE ENCOUNTER
----- Message from Mary Cuellar sent at 1/8/2018  1:29 PM CST -----  Contact: self [phone] cell  Type: Rx    Name of medication(s): allopurinol (ZYLOPRIM) 100 MG tablet    Is this a refill? New rx? New    Who prescribed medication? [facility]    Pharmacy Name, Phone, & Location: [facility]    Comments: Per [facility] a new prescription needs to be sent    Thank you

## 2018-01-11 DIAGNOSIS — M10.9 GOUTY ARTHRITIS: ICD-10-CM

## 2018-01-11 RX ORDER — ALLOPURINOL 100 MG/1
200 TABLET ORAL DAILY
Qty: 180 TABLET | Refills: 3 | Status: SHIPPED | OUTPATIENT
Start: 2018-01-11 | End: 2020-08-18 | Stop reason: SDUPTHER

## 2018-01-26 ENCOUNTER — PATIENT OUTREACH (OUTPATIENT)
Dept: ORTHOPEDICS | Facility: CLINIC | Age: 78
End: 2018-01-26

## 2018-04-19 ENCOUNTER — PES CALL (OUTPATIENT)
Dept: ADMINISTRATIVE | Facility: CLINIC | Age: 78
End: 2018-04-19

## 2018-05-16 DIAGNOSIS — I10 ESSENTIAL HYPERTENSION: ICD-10-CM

## 2018-05-16 RX ORDER — LOSARTAN POTASSIUM 100 MG/1
TABLET ORAL
Qty: 90 TABLET | Refills: 3 | Status: SHIPPED | OUTPATIENT
Start: 2018-05-16 | End: 2018-06-12

## 2018-05-21 ENCOUNTER — TELEPHONE (OUTPATIENT)
Dept: INTERNAL MEDICINE | Facility: CLINIC | Age: 78
End: 2018-05-21

## 2018-05-21 DIAGNOSIS — I10 ESSENTIAL HYPERTENSION, BENIGN: Primary | ICD-10-CM

## 2018-05-21 DIAGNOSIS — M10.9 GOUT, UNSPECIFIED CAUSE, UNSPECIFIED CHRONICITY, UNSPECIFIED SITE: ICD-10-CM

## 2018-05-22 ENCOUNTER — TELEPHONE (OUTPATIENT)
Dept: INTERNAL MEDICINE | Facility: CLINIC | Age: 78
End: 2018-05-22

## 2018-05-22 NOTE — TELEPHONE ENCOUNTER
Dr Betts office called to see if patient needs antib prior to dental procedure. He had need replacement 02/17/17.  [phone]

## 2018-05-23 NOTE — TELEPHONE ENCOUNTER
Mr Miller states he did not have an infection when he had the TKA, Dentist notified no pre med needed before procedure.

## 2018-05-23 NOTE — TELEPHONE ENCOUNTER
Current recommendations say no prophylaxis unless he had a prior infection.  I dont think he needs any antibiotic.

## 2018-05-28 ENCOUNTER — LAB VISIT (OUTPATIENT)
Dept: LAB | Facility: HOSPITAL | Age: 78
End: 2018-05-28
Attending: INTERNAL MEDICINE
Payer: MEDICARE

## 2018-05-28 DIAGNOSIS — I10 ESSENTIAL HYPERTENSION, BENIGN: ICD-10-CM

## 2018-05-28 DIAGNOSIS — M10.9 GOUT, UNSPECIFIED CAUSE, UNSPECIFIED CHRONICITY, UNSPECIFIED SITE: ICD-10-CM

## 2018-05-28 LAB
ALBUMIN SERPL BCP-MCNC: 4.1 G/DL
ALP SERPL-CCNC: 66 U/L
ALT SERPL W/O P-5'-P-CCNC: 33 U/L
ANION GAP SERPL CALC-SCNC: 8 MMOL/L
AST SERPL-CCNC: 46 U/L
BASOPHILS # BLD AUTO: 0.09 K/UL
BASOPHILS NFR BLD: 1.4 %
BILIRUB SERPL-MCNC: 1.3 MG/DL
BUN SERPL-MCNC: 10 MG/DL
CALCIUM SERPL-MCNC: 9.7 MG/DL
CHLORIDE SERPL-SCNC: 95 MMOL/L
CHOLEST SERPL-MCNC: 144 MG/DL
CHOLEST/HDLC SERPL: 2 {RATIO}
CO2 SERPL-SCNC: 27 MMOL/L
CREAT SERPL-MCNC: 1.1 MG/DL
DIFFERENTIAL METHOD: ABNORMAL
EOSINOPHIL # BLD AUTO: 0.8 K/UL
EOSINOPHIL NFR BLD: 12.4 %
ERYTHROCYTE [DISTWIDTH] IN BLOOD BY AUTOMATED COUNT: 12.3 %
EST. GFR  (AFRICAN AMERICAN): >60 ML/MIN/1.73 M^2
EST. GFR  (NON AFRICAN AMERICAN): >60 ML/MIN/1.73 M^2
GLUCOSE SERPL-MCNC: 122 MG/DL
HCT VFR BLD AUTO: 38.4 %
HDLC SERPL-MCNC: 71 MG/DL
HDLC SERPL: 49.3 %
HGB BLD-MCNC: 13.4 G/DL
LDLC SERPL CALC-MCNC: 52.6 MG/DL
LYMPHOCYTES # BLD AUTO: 1.9 K/UL
LYMPHOCYTES NFR BLD: 29.4 %
MCH RBC QN AUTO: 35.3 PG
MCHC RBC AUTO-ENTMCNC: 34.9 G/DL
MCV RBC AUTO: 101 FL
MONOCYTES # BLD AUTO: 0.9 K/UL
MONOCYTES NFR BLD: 13.8 %
NEUTROPHILS # BLD AUTO: 2.7 K/UL
NEUTROPHILS NFR BLD: 42.8 %
NONHDLC SERPL-MCNC: 73 MG/DL
PLATELET # BLD AUTO: 254 K/UL
PMV BLD AUTO: 9.9 FL
POTASSIUM SERPL-SCNC: 4.9 MMOL/L
PROT SERPL-MCNC: 7.1 G/DL
RBC # BLD AUTO: 3.8 M/UL
SODIUM SERPL-SCNC: 130 MMOL/L
TRIGL SERPL-MCNC: 102 MG/DL
URATE SERPL-MCNC: 4.6 MG/DL
WBC # BLD AUTO: 6.3 K/UL

## 2018-05-28 PROCEDURE — 85025 COMPLETE CBC W/AUTO DIFF WBC: CPT

## 2018-05-28 PROCEDURE — 84550 ASSAY OF BLOOD/URIC ACID: CPT

## 2018-05-28 PROCEDURE — 80061 LIPID PANEL: CPT

## 2018-05-28 PROCEDURE — 36415 COLL VENOUS BLD VENIPUNCTURE: CPT

## 2018-05-28 PROCEDURE — 80053 COMPREHEN METABOLIC PANEL: CPT

## 2018-05-30 ENCOUNTER — LAB VISIT (OUTPATIENT)
Dept: LAB | Facility: HOSPITAL | Age: 78
End: 2018-05-30
Attending: INTERNAL MEDICINE
Payer: MEDICARE

## 2018-05-30 ENCOUNTER — OFFICE VISIT (OUTPATIENT)
Dept: INTERNAL MEDICINE | Facility: CLINIC | Age: 78
End: 2018-05-30
Payer: MEDICARE

## 2018-05-30 VITALS
HEART RATE: 64 BPM | WEIGHT: 210.56 LBS | SYSTOLIC BLOOD PRESSURE: 110 MMHG | BODY MASS INDEX: 29.48 KG/M2 | HEIGHT: 71 IN | DIASTOLIC BLOOD PRESSURE: 80 MMHG

## 2018-05-30 DIAGNOSIS — E87.1 HYPONATREMIA: ICD-10-CM

## 2018-05-30 DIAGNOSIS — H61.23 BILATERAL IMPACTED CERUMEN: ICD-10-CM

## 2018-05-30 DIAGNOSIS — I10 ESSENTIAL HYPERTENSION: Primary | ICD-10-CM

## 2018-05-30 DIAGNOSIS — L30.9 ECZEMA, UNSPECIFIED TYPE: ICD-10-CM

## 2018-05-30 DIAGNOSIS — G62.1 NEUROPATHY, ALCOHOLIC: ICD-10-CM

## 2018-05-30 LAB
ANION GAP SERPL CALC-SCNC: 7 MMOL/L
BUN SERPL-MCNC: 14 MG/DL
CALCIUM SERPL-MCNC: 9.8 MG/DL
CHLORIDE SERPL-SCNC: 93 MMOL/L
CO2 SERPL-SCNC: 28 MMOL/L
CREAT SERPL-MCNC: 1.1 MG/DL
EST. GFR  (AFRICAN AMERICAN): >60 ML/MIN/1.73 M^2
EST. GFR  (NON AFRICAN AMERICAN): >60 ML/MIN/1.73 M^2
GLUCOSE SERPL-MCNC: 123 MG/DL
POTASSIUM SERPL-SCNC: 4.9 MMOL/L
SODIUM SERPL-SCNC: 128 MMOL/L

## 2018-05-30 PROCEDURE — 3074F SYST BP LT 130 MM HG: CPT | Mod: CPTII,S$GLB,, | Performed by: INTERNAL MEDICINE

## 2018-05-30 PROCEDURE — 99499 UNLISTED E&M SERVICE: CPT | Mod: S$GLB,,, | Performed by: INTERNAL MEDICINE

## 2018-05-30 PROCEDURE — 3079F DIAST BP 80-89 MM HG: CPT | Mod: CPTII,S$GLB,, | Performed by: INTERNAL MEDICINE

## 2018-05-30 PROCEDURE — 99214 OFFICE O/P EST MOD 30 MIN: CPT | Mod: S$GLB,,, | Performed by: INTERNAL MEDICINE

## 2018-05-30 PROCEDURE — 36415 COLL VENOUS BLD VENIPUNCTURE: CPT

## 2018-05-30 PROCEDURE — 80048 BASIC METABOLIC PNL TOTAL CA: CPT

## 2018-05-30 PROCEDURE — 99999 PR PBB SHADOW E&M-EST. PATIENT-LVL III: CPT | Mod: PBBFAC,,, | Performed by: INTERNAL MEDICINE

## 2018-05-30 RX ORDER — TRIAMCINOLONE ACETONIDE 1 MG/G
CREAM TOPICAL 2 TIMES DAILY
Qty: 60 G | Refills: 2 | Status: SHIPPED | OUTPATIENT
Start: 2018-05-30 | End: 2019-03-25 | Stop reason: SDUPTHER

## 2018-05-30 RX ORDER — MUPIROCIN 20 MG/G
OINTMENT TOPICAL DAILY
Qty: 1 TUBE | Refills: 2 | Status: CANCELLED | OUTPATIENT
Start: 2018-05-30

## 2018-05-30 NOTE — PROGRESS NOTES
Subjective:       Patient ID: Ej Miller is a 78 y.o. male.    Chief Complaint: Annual Exam; Hypertension; and Hyperglycemia    Hypertension   This is a chronic problem. The problem is unchanged. The problem is controlled. Pertinent negatives include no chest pain or shortness of breath. The current treatment provides significant improvement. There are no compliance problems.      Review of Systems   HENT:        Ear wax buildup   Respiratory: Negative for shortness of breath.    Cardiovascular: Negative for chest pain.       Objective:      Physical Exam   Constitutional: He is oriented to person, place, and time. He appears well-developed and well-nourished. No distress.   HENT:   Head: Normocephalic and atraumatic.   Mouth/Throat: Oropharynx is clear and moist.   bilat cerumen buildup, subtotal occl   Eyes: Conjunctivae are normal. Pupils are equal, round, and reactive to light.   Neck: Normal range of motion. Neck supple.   Cardiovascular: Normal rate, regular rhythm and normal heart sounds.    Pulmonary/Chest: Effort normal and breath sounds normal. He has no wheezes.   Abdominal: Soft. Bowel sounds are normal. There is no tenderness.   Musculoskeletal: Normal range of motion. He exhibits no edema or tenderness.   Neurological: He is alert and oriented to person, place, and time. No cranial nerve deficit.   Skin: No erythema.   Psychiatric: He has a normal mood and affect.   Vitals reviewed.      Assessment:       1. Essential hypertension    2. Eczema, unspecified type    3. Hyponatremia    4. Neuropathy, alcoholic    5. Bilateral impacted cerumen        Plan:       Ej was seen today for annual exam, hypertension and hyperglycemia.    Diagnoses and all orders for this visit:    Essential hypertension    Eczema, unspecified type  -     triamcinolone acetonide 0.1% (KENALOG) 0.1 % cream; Apply topically 2 (two) times daily.    Hyponatremia  Comments:  ? alcohol related?  Orders:  -     Basic  metabolic panel; Future    Neuropathy, alcoholic    Bilateral impacted cerumen  Comments:  rec Ear wax remval kit OTC and mineral oil maintenence    Other orders  -     Cancel: mupirocin (BACTROBAN) 2 % ointment; Apply topically once daily.        Follow-up in about 9 months (around 2/28/2019) for F/U APPOINTMENT WITH ME, WITH LAB BEFORE.

## 2018-05-31 ENCOUNTER — TELEPHONE (OUTPATIENT)
Dept: INTERNAL MEDICINE | Facility: CLINIC | Age: 78
End: 2018-05-31

## 2018-05-31 DIAGNOSIS — E87.1 HYPONATREMIA: Primary | ICD-10-CM

## 2018-06-01 ENCOUNTER — LAB VISIT (OUTPATIENT)
Dept: LAB | Facility: HOSPITAL | Age: 78
End: 2018-06-01
Attending: INTERNAL MEDICINE
Payer: MEDICARE

## 2018-06-01 ENCOUNTER — TELEPHONE (OUTPATIENT)
Dept: INTERNAL MEDICINE | Facility: CLINIC | Age: 78
End: 2018-06-01

## 2018-06-01 DIAGNOSIS — E87.1 HYPONATREMIA: Primary | ICD-10-CM

## 2018-06-01 DIAGNOSIS — E87.1 HYPONATREMIA: ICD-10-CM

## 2018-06-01 LAB
OSMOLALITY UR: 192 MOSM/KG
SODIUM UR-SCNC: 32 MMOL/L

## 2018-06-01 PROCEDURE — 84300 ASSAY OF URINE SODIUM: CPT

## 2018-06-01 PROCEDURE — 83935 ASSAY OF URINE OSMOLALITY: CPT

## 2018-06-01 NOTE — TELEPHONE ENCOUNTER
Uriine tests suggest low Na intake-  Will ask him to libneralize salt intake, discontinue losartan, and recheck lab next week.  PC

## 2018-06-04 ENCOUNTER — TELEPHONE (OUTPATIENT)
Dept: INTERNAL MEDICINE | Facility: CLINIC | Age: 78
End: 2018-06-04

## 2018-06-04 NOTE — TELEPHONE ENCOUNTER
Patient states you were suppose to call in an ointment and cream for him, he got the cream but not the ointment.

## 2018-06-04 NOTE — TELEPHONE ENCOUNTER
We ordered Triamcinolone cream.  He asked about Bactroban (mupirocin) ointment.  Not appropriate at this time.  RAKESH

## 2018-06-04 NOTE — TELEPHONE ENCOUNTER
----- Message from Sandy Watson sent at 6/4/2018 12:42 PM CDT -----  Contact: self/[phone]  Patient called in regards needing to talk with Dr Moon nurse about the two medication that he was suppose to be prescribe but pharmacy only gave him one. Please call and advise. Thank you!!!

## 2018-06-07 ENCOUNTER — LAB VISIT (OUTPATIENT)
Dept: LAB | Facility: HOSPITAL | Age: 78
End: 2018-06-07
Attending: INTERNAL MEDICINE
Payer: MEDICARE

## 2018-06-07 DIAGNOSIS — E87.1 HYPONATREMIA: ICD-10-CM

## 2018-06-07 LAB
ANION GAP SERPL CALC-SCNC: 9 MMOL/L
BUN SERPL-MCNC: 7 MG/DL
CALCIUM SERPL-MCNC: 9.4 MG/DL
CHLORIDE SERPL-SCNC: 100 MMOL/L
CO2 SERPL-SCNC: 27 MMOL/L
CREAT SERPL-MCNC: 0.9 MG/DL
EST. GFR  (AFRICAN AMERICAN): >60 ML/MIN/1.73 M^2
EST. GFR  (NON AFRICAN AMERICAN): >60 ML/MIN/1.73 M^2
GLUCOSE SERPL-MCNC: 97 MG/DL
POTASSIUM SERPL-SCNC: 4.1 MMOL/L
SODIUM SERPL-SCNC: 136 MMOL/L

## 2018-06-07 PROCEDURE — 36415 COLL VENOUS BLD VENIPUNCTURE: CPT

## 2018-06-07 PROCEDURE — 80048 BASIC METABOLIC PNL TOTAL CA: CPT

## 2018-06-12 ENCOUNTER — TELEPHONE (OUTPATIENT)
Dept: INTERNAL MEDICINE | Facility: CLINIC | Age: 78
End: 2018-06-12

## 2018-06-12 NOTE — TELEPHONE ENCOUNTER
----- Message from Imani Taveras sent at 6/12/2018 11:36 AM CDT -----  Contact: self/[phone]  Pt called in regards to doing blood work on last week and would like to get his results.      Please advise

## 2018-07-30 ENCOUNTER — PES CALL (OUTPATIENT)
Dept: ADMINISTRATIVE | Facility: CLINIC | Age: 78
End: 2018-07-30

## 2018-07-30 DIAGNOSIS — I10 ESSENTIAL HYPERTENSION: ICD-10-CM

## 2018-07-31 RX ORDER — AMLODIPINE BESYLATE 5 MG/1
TABLET ORAL
Qty: 90 TABLET | Refills: 3 | Status: SHIPPED | OUTPATIENT
Start: 2018-07-31 | End: 2019-06-03 | Stop reason: SDUPTHER

## 2018-07-31 RX ORDER — METOPROLOL SUCCINATE 100 MG/1
TABLET, EXTENDED RELEASE ORAL
Qty: 90 TABLET | Refills: 3 | Status: SHIPPED | OUTPATIENT
Start: 2018-07-31 | End: 2019-06-03 | Stop reason: SDUPTHER

## 2019-02-28 ENCOUNTER — PES CALL (OUTPATIENT)
Dept: ADMINISTRATIVE | Facility: CLINIC | Age: 79
End: 2019-02-28

## 2019-03-12 ENCOUNTER — TELEPHONE (OUTPATIENT)
Dept: INTERNAL MEDICINE | Facility: CLINIC | Age: 79
End: 2019-03-12

## 2019-03-12 NOTE — TELEPHONE ENCOUNTER
----- Message from Laine Calabrese sent at 3/12/2019 11:08 AM CDT -----  Contact: self  Pt is requesting to speak with Aracely. Pt is calling in regards to a form he needs to have filled out.    He can be reached at [phone].    Thank you

## 2019-03-18 ENCOUNTER — TELEPHONE (OUTPATIENT)
Dept: INTERNAL MEDICINE | Facility: CLINIC | Age: 79
End: 2019-03-18

## 2019-03-18 NOTE — TELEPHONE ENCOUNTER
----- Message from Mary Cuellar sent at 3/18/2019 10:39 AM CDT -----  Contact: self [phone] cell  Eye surgery is this coming Wed; surgery was just scheduled and patient needs to have a pre op prior to Wed, and wants his doctor to see him. Please call him back and advise    Thank you

## 2019-03-18 NOTE — TELEPHONE ENCOUNTER
Patient already cleared for surgery form faxed last week. Declined appt here since there was nothing available for today will go to  walk in for the rash.

## 2019-03-25 ENCOUNTER — OFFICE VISIT (OUTPATIENT)
Dept: INTERNAL MEDICINE | Facility: CLINIC | Age: 79
End: 2019-03-25
Payer: MEDICARE

## 2019-03-25 ENCOUNTER — TELEPHONE (OUTPATIENT)
Dept: INTERNAL MEDICINE | Facility: CLINIC | Age: 79
End: 2019-03-25

## 2019-03-25 VITALS
BODY MASS INDEX: 29.38 KG/M2 | OXYGEN SATURATION: 96 % | HEIGHT: 71 IN | SYSTOLIC BLOOD PRESSURE: 118 MMHG | WEIGHT: 209.88 LBS | DIASTOLIC BLOOD PRESSURE: 60 MMHG | HEART RATE: 65 BPM

## 2019-03-25 DIAGNOSIS — L30.9 ECZEMA, UNSPECIFIED TYPE: ICD-10-CM

## 2019-03-25 PROCEDURE — 1101F PT FALLS ASSESS-DOCD LE1/YR: CPT | Mod: HCNC,CPTII,S$GLB, | Performed by: INTERNAL MEDICINE

## 2019-03-25 PROCEDURE — 1101F PR PT FALLS ASSESS DOC 0-1 FALLS W/OUT INJ PAST YR: ICD-10-PCS | Mod: HCNC,CPTII,S$GLB, | Performed by: INTERNAL MEDICINE

## 2019-03-25 PROCEDURE — 3078F PR MOST RECENT DIASTOLIC BLOOD PRESSURE < 80 MM HG: ICD-10-PCS | Mod: HCNC,CPTII,S$GLB, | Performed by: INTERNAL MEDICINE

## 2019-03-25 PROCEDURE — 3074F SYST BP LT 130 MM HG: CPT | Mod: HCNC,CPTII,S$GLB, | Performed by: INTERNAL MEDICINE

## 2019-03-25 PROCEDURE — 99499 RISK ADDL DX/OHS AUDIT: ICD-10-PCS | Mod: HCNC,S$GLB,, | Performed by: INTERNAL MEDICINE

## 2019-03-25 PROCEDURE — 99213 PR OFFICE/OUTPT VISIT, EST, LEVL III, 20-29 MIN: ICD-10-PCS | Mod: HCNC,S$GLB,, | Performed by: INTERNAL MEDICINE

## 2019-03-25 PROCEDURE — 3078F DIAST BP <80 MM HG: CPT | Mod: HCNC,CPTII,S$GLB, | Performed by: INTERNAL MEDICINE

## 2019-03-25 PROCEDURE — 3074F PR MOST RECENT SYSTOLIC BLOOD PRESSURE < 130 MM HG: ICD-10-PCS | Mod: HCNC,CPTII,S$GLB, | Performed by: INTERNAL MEDICINE

## 2019-03-25 PROCEDURE — 99999 PR PBB SHADOW E&M-EST. PATIENT-LVL III: CPT | Mod: PBBFAC,HCNC,, | Performed by: INTERNAL MEDICINE

## 2019-03-25 PROCEDURE — 99213 OFFICE O/P EST LOW 20 MIN: CPT | Mod: HCNC,S$GLB,, | Performed by: INTERNAL MEDICINE

## 2019-03-25 PROCEDURE — 99999 PR PBB SHADOW E&M-EST. PATIENT-LVL III: ICD-10-PCS | Mod: PBBFAC,HCNC,, | Performed by: INTERNAL MEDICINE

## 2019-03-25 PROCEDURE — 99499 UNLISTED E&M SERVICE: CPT | Mod: HCNC,S$GLB,, | Performed by: INTERNAL MEDICINE

## 2019-03-25 RX ORDER — TRIAMCINOLONE ACETONIDE 1 MG/G
CREAM TOPICAL 2 TIMES DAILY
Qty: 60 G | Refills: 0 | Status: SHIPPED | OUTPATIENT
Start: 2019-03-25 | End: 2020-06-02

## 2019-03-25 NOTE — TELEPHONE ENCOUNTER
----- Message from Mario Yepez sent at 3/25/2019  9:59 AM CDT -----  Contact: Patient [phone] or [phone] Cell  Patient calling stating has a rash and would like to discus with office, would also liek to be fit in for an appt, requesting for a call back to [facility]. Patient [phone] or [phone] Cell  Please call an advise  Thank you

## 2019-03-25 NOTE — PROGRESS NOTES
"Subjective:       Patient ID: Ej Miller is a 78 y.o. male.    Chief Complaint: Rash (all over body, a little red, itches, has had it for a couple of years now "it goes and comes and moves around" has never been to a Dermatologist  )    Here for urgent care -- has rash for a couple yrs, prev cream from Dr Floyd Moon MD helps but does not cure it. On triamcinolone.    Also c/o sore throat for yrs and chronic am phlegm.    Review of Systems   Constitutional: Negative for activity change.   HENT: Positive for congestion and postnasal drip. Negative for sinus pressure, sinus pain and voice change.    Skin: Positive for rash.       Objective:      Physical Exam   Constitutional: He appears well-developed and well-nourished. No distress.   HENT:   Head: Normocephalic and atraumatic.   Mouth/Throat: No oropharyngeal exudate.   sinuses nontender, nasal mucosa w/o purulence.   Eyes: Pupils are equal, round, and reactive to light. EOM are normal. No scleral icterus.   Neck: Normal range of motion. Neck supple. No thyromegaly present.   Pulmonary/Chest: Effort normal and breath sounds normal. No respiratory distress. He has no wheezes. He has no rales.   Lymphadenopathy:     He has no cervical adenopathy.   Skin: He is not diaphoretic.   Crops of scaly rash abd wall and bilat ankles.  He has dry skin.  No hives seen.   Psychiatric: He has a normal mood and affect. His behavior is normal.       Assessment:       1. Eczema, unspecified type        Plan:       Ej was seen today for rash.    Diagnoses and all orders for this visit:    Eczema, unspecified type  -     triamcinolone acetonide 0.1% (KENALOG) 0.1 % cream; Apply topically 2 (two) times daily.  Explained that if not better in 1-2 weeks, pt should rtc/call PCP        Patient Instructions   Try cerave skin moisturizer    Loratadine or claritin 10mg every day as needed for congestion.    cc Flyod Moon MD             "

## 2019-03-31 NOTE — PROGRESS NOTES
Ej Miller presents for post-operative evaluation.  He is status-post  Knee arthroplasty.  The wound is healing well with no signs of erythema or warmth.  There is no drainage.  No clinical signs or symptoms of infection are present. .  ROM 0-120.    Post-operative radiographs were obtained today and show satisfactory position of the prosthesis.    We will continue post-operative physical therapy.    Follow-up in 6 weeks.     Subjective:       Patient ID: Noel Sinha is a 18 y.o. female.    Vitals:  vitals were not taken for this visit.     Chief Complaint: Foot Injury    Foot Injury          Constitution: Negative for fatigue.   HENT: Negative for facial swelling and facial trauma.    Neck: Negative for neck stiffness.   Cardiovascular: Negative for chest trauma.   Eyes: Negative for eye trauma, double vision and blurred vision.   Gastrointestinal: Negative for abdominal trauma, abdominal pain and rectal bleeding.   Genitourinary: Negative for hematuria, missed menses, genital trauma and pelvic pain.   Musculoskeletal: Negative for pain, trauma, joint swelling and abnormal ROM of joint.   Skin: Negative for color change, wound, abrasion, laceration and bruising.   Neurological: Negative for dizziness, history of vertigo, light-headedness, coordination disturbances, altered mental status and loss of consciousness.   Hematologic/Lymphatic: Negative for history of bleeding disorder.   Psychiatric/Behavioral: Negative for altered mental status.       Objective:      Physical Exam   Constitutional: She is oriented to person, place, and time. She appears well-developed and well-nourished. She is cooperative.  Non-toxic appearance. She does not appear ill. No distress.   HENT:   Head: Normocephalic and atraumatic.   Right Ear: Hearing, tympanic membrane, external ear and ear canal normal.   Left Ear: Hearing, tympanic membrane, external ear and ear canal normal.   Nose: Nose normal. No mucosal edema, rhinorrhea or nasal deformity. No epistaxis. Right sinus exhibits no maxillary sinus tenderness and no frontal sinus tenderness. Left sinus exhibits no maxillary sinus tenderness and no frontal sinus tenderness.   Mouth/Throat: Uvula is midline, oropharynx is clear and moist and mucous membranes are normal. No trismus in the jaw. Normal dentition. No uvula swelling. No posterior oropharyngeal erythema.   Eyes: Conjunctivae and lids are  normal. Right eye exhibits no discharge. Left eye exhibits no discharge. No scleral icterus.   Sclera clear bilat   Neck: Trachea normal, normal range of motion, full passive range of motion without pain and phonation normal. Neck supple.   Cardiovascular: Normal rate, regular rhythm, normal heart sounds, intact distal pulses and normal pulses.   Pulmonary/Chest: Effort normal and breath sounds normal. No respiratory distress.   Abdominal: Soft. Normal appearance and bowel sounds are normal. She exhibits no distension, no pulsatile midline mass and no mass. There is no tenderness.   Musculoskeletal: Normal range of motion. She exhibits no edema or deformity.   Neurological: She is alert and oriented to person, place, and time. She exhibits normal muscle tone. Coordination normal.   Skin: Skin is warm, dry and intact. She is not diaphoretic. No pallor.   Psychiatric: She has a normal mood and affect. Her speech is normal and behavior is normal. Judgment and thought content normal. Cognition and memory are normal.   Nursing note and vitals reviewed.      Assessment:       No diagnosis found.    Plan:         There are no diagnoses linked to this encounter.

## 2019-05-15 ENCOUNTER — TELEPHONE (OUTPATIENT)
Dept: INTERNAL MEDICINE | Facility: CLINIC | Age: 79
End: 2019-05-15

## 2019-05-15 RX ORDER — PREDNISOLONE ACETATE 10 MG/ML
SUSPENSION/ DROPS OPHTHALMIC
Refills: 0 | COMMUNITY
Start: 2019-03-11 | End: 2020-06-02 | Stop reason: ALTCHOICE

## 2019-05-15 RX ORDER — OFLOXACIN 3 MG/ML
SOLUTION/ DROPS OPHTHALMIC
Refills: 0 | COMMUNITY
Start: 2019-03-11 | End: 2020-06-02 | Stop reason: ALTCHOICE

## 2019-05-15 RX ORDER — BROMFENAC 0.76 MG/ML
SOLUTION/ DROPS OPHTHALMIC
Refills: 0 | COMMUNITY
Start: 2019-03-12

## 2019-05-15 NOTE — TELEPHONE ENCOUNTER
----- Message from Mario Yepez sent at 5/15/2019 12:56 PM CDT -----  Contact: Patient [phone] or [phone] Cell  Sooner appointment than the  can schedule.  Did you offer to schedule the next available appointment and put the patient on the wait list?:    When is the first available appointment: no slots available  What is the nature of the appointment: clearance for eye surgery 05/29/19  What visit type: EP  Patient preference of timeframe to be scheduled:  READ Comment as well...      Comments: Also pulled hamstring in the back, would like to know what can be done?    Please call an advise  Thank you

## 2019-05-16 ENCOUNTER — OFFICE VISIT (OUTPATIENT)
Dept: INTERNAL MEDICINE | Facility: CLINIC | Age: 79
End: 2019-05-16
Payer: MEDICARE

## 2019-05-16 VITALS
SYSTOLIC BLOOD PRESSURE: 130 MMHG | WEIGHT: 215.38 LBS | DIASTOLIC BLOOD PRESSURE: 70 MMHG | HEIGHT: 71 IN | HEART RATE: 54 BPM | BODY MASS INDEX: 30.15 KG/M2

## 2019-05-16 DIAGNOSIS — H25.9 SENILE CATARACT OF RIGHT EYE, UNSPECIFIED AGE-RELATED CATARACT TYPE: ICD-10-CM

## 2019-05-16 DIAGNOSIS — Z01.818 PREOP EXAM FOR INTERNAL MEDICINE: Primary | ICD-10-CM

## 2019-05-16 DIAGNOSIS — I10 ESSENTIAL HYPERTENSION: Chronic | ICD-10-CM

## 2019-05-16 PROBLEM — M17.0 PRIMARY OSTEOARTHRITIS OF BOTH KNEES: Status: RESOLVED | Noted: 2017-02-17 | Resolved: 2019-05-16

## 2019-05-16 PROBLEM — Z96.651 STATUS POST TOTAL RIGHT KNEE REPLACEMENT: Status: RESOLVED | Noted: 2017-03-03 | Resolved: 2019-05-16

## 2019-05-16 PROCEDURE — 99999 PR PBB SHADOW E&M-EST. PATIENT-LVL III: ICD-10-PCS | Mod: PBBFAC,HCNC,, | Performed by: INTERNAL MEDICINE

## 2019-05-16 PROCEDURE — 3078F PR MOST RECENT DIASTOLIC BLOOD PRESSURE < 80 MM HG: ICD-10-PCS | Mod: HCNC,CPTII,S$GLB, | Performed by: INTERNAL MEDICINE

## 2019-05-16 PROCEDURE — 99499 UNLISTED E&M SERVICE: CPT | Mod: S$GLB,,, | Performed by: INTERNAL MEDICINE

## 2019-05-16 PROCEDURE — 3078F DIAST BP <80 MM HG: CPT | Mod: HCNC,CPTII,S$GLB, | Performed by: INTERNAL MEDICINE

## 2019-05-16 PROCEDURE — 99214 PR OFFICE/OUTPT VISIT, EST, LEVL IV, 30-39 MIN: ICD-10-PCS | Mod: HCNC,S$GLB,, | Performed by: INTERNAL MEDICINE

## 2019-05-16 PROCEDURE — 1101F PR PT FALLS ASSESS DOC 0-1 FALLS W/OUT INJ PAST YR: ICD-10-PCS | Mod: HCNC,CPTII,S$GLB, | Performed by: INTERNAL MEDICINE

## 2019-05-16 PROCEDURE — 99999 PR PBB SHADOW E&M-EST. PATIENT-LVL III: CPT | Mod: PBBFAC,HCNC,, | Performed by: INTERNAL MEDICINE

## 2019-05-16 PROCEDURE — 3075F PR MOST RECENT SYSTOLIC BLOOD PRESS GE 130-139MM HG: ICD-10-PCS | Mod: HCNC,CPTII,S$GLB, | Performed by: INTERNAL MEDICINE

## 2019-05-16 PROCEDURE — 99214 OFFICE O/P EST MOD 30 MIN: CPT | Mod: HCNC,S$GLB,, | Performed by: INTERNAL MEDICINE

## 2019-05-16 PROCEDURE — 1101F PT FALLS ASSESS-DOCD LE1/YR: CPT | Mod: HCNC,CPTII,S$GLB, | Performed by: INTERNAL MEDICINE

## 2019-05-16 PROCEDURE — 99499 RISK ADDL DX/OHS AUDIT: ICD-10-PCS | Mod: S$GLB,,, | Performed by: INTERNAL MEDICINE

## 2019-05-16 PROCEDURE — 3075F SYST BP GE 130 - 139MM HG: CPT | Mod: HCNC,CPTII,S$GLB, | Performed by: INTERNAL MEDICINE

## 2019-05-16 NOTE — LETTER
May 16, 2019        Nathanael Liriano MD  [id number]  [place] [address]             [facility] - Internal Medicine  [address] [address]  Phone: [phone]  Fax: [phone]   Patient: Ej Miller   MR Number: [id number]   YOB: 1940   Date of Visit: 5/16/2019       Dear Dr. Liriano:    Ej Miller was seen this morning for a pre-op evaluation. His medical problems are stable and he does not need any additional evaluation and may proceed to surgery for his cataract. Attached you will find relevant portions of my assessment and plan of care.    Sincerely,      AMELIE Liu II, MD            CC  No Recipients    Enclosure

## 2019-05-16 NOTE — PROGRESS NOTES
Chief Complaint   Patient presents with    Pre-op Exam     R eye cataract 05/29/19 Dr Liriano     Back Pain       No other complaints are being voiced today.   Patient Active Problem List   Diagnosis    HTN (hypertension)    Gouty arthritis    Alcohol abuse    Neuropathy, alcoholic    History of colon polyps    Primary open angle glaucoma of right eye, moderate stage    Open angle with borderline findings of left eye    Eczema         PMFSH: All information was reviewed and updated as necessary. Please see full encounter for the details.  Current Outpatient Medications on File Prior to Visit   Medication Sig Dispense Refill    allopurinol (ZYLOPRIM) 100 MG tablet Take 2 tablets (200 mg total) by mouth once daily. 180 tablet 3    amLODIPine (NORVASC) 5 MG tablet TAKE 1 TABLET EVERY DAY 90 tablet 3    aspirin 81 MG Chew Take 81 mg by mouth once daily.      BROMSITE 0.075 % Drop INSTILL 1 DROP INTO THE RIGHT EYE TWO TIMES A DAY  0    dorzolamide-timolol 2-0.5% (COSOPT) 22.3-6.8 mg/mL ophthalmic solution       latanoprost 0.005 % ophthalmic solution       MULTIVIT-MIN/FA/LYCOPEN/LUTEIN (MEN 50 PLUS MULTIVITAMIN ORAL) Take by mouth.      ofloxacin (OCUFLOX) 0.3 % ophthalmic solution INSTILL 1 DROP INTO THE RIGHT EYE FOUR TIMES A DAY  0    prednisoLONE acetate (PRED FORTE) 1 % DrpS INSTILL 1 DROP INTO THE RIGHT EYE FOUR TIMES A DAY  0    triamcinolone acetonide 0.1% (KENALOG) 0.1 % cream Apply topically 2 (two) times daily. 60 g 0    metoprolol succinate (TOPROL-XL) 100 MG 24 hr tablet TAKE 1 TABLET ONE TIME DAILY (NEED MD APPOINTMENT) 90 tablet 3    [DISCONTINUED] colchicine 0.6 mg tablet Take 1 tablet (0.6 mg total) by mouth 2 (two) times daily. 60 tablet 6    [DISCONTINUED] diclofenac sodium (VOLTAREN) 1 % Gel Apply 2 g topically 4 (four) times daily. Apply to painful area up to four times a day. 1 Tube 6     No current facility-administered medications on file prior to visit.          ROS:  GENERAL: No fever, chills, fatigability or weight loss.  HEENT: Visual acuity has been decreased because of cataracts otherwise HEENT ROS is negative   CHEST: Denies NOLASCO, cyanosis, wheezing, cough and sputum production.  CARDIOVASCULAR: Denies chest pain, PND, orthopnea or reduced exercise tolerance.    OBJECTIVE:  APPEARANCE: no acute distress.  Appearing healthy. well nourished.  VS: see nursing notes.  CHEST: Lungs clear. Normal respiratory effort.  CARDIOVASCULAR: RRR. No edema.    ASSESSMENT:  1. Preop exam for internal medicine  PLAN:   Medications:  - continue all medicines as previously prescribed.  According to the ACC/AHA and ACP guidelines for preoperative cardiovascular risk assesment this patient is low risk for this very Low risk surgery. Preoperative evaluations are not recommended for cataract removal.  I do not recommend any additional work up at this time.  All chronic medical probelms are currently stable and well controlled.      2. Senile cataract of right eye, unspecified age-related cataract type    3. Essential hypertension  Stable and controlled. Continue current medications/No Medication changes made today regarding this problem.

## 2019-06-03 DIAGNOSIS — I10 ESSENTIAL HYPERTENSION: ICD-10-CM

## 2019-06-03 RX ORDER — METOPROLOL SUCCINATE 100 MG/1
TABLET, EXTENDED RELEASE ORAL
Qty: 90 TABLET | Refills: 3 | Status: SHIPPED | OUTPATIENT
Start: 2019-06-03 | End: 2020-05-07

## 2019-06-03 RX ORDER — AMLODIPINE BESYLATE 5 MG/1
TABLET ORAL
Qty: 90 TABLET | Refills: 3 | Status: SHIPPED | OUTPATIENT
Start: 2019-06-03 | End: 2020-05-25

## 2019-10-25 ENCOUNTER — TELEPHONE (OUTPATIENT)
Dept: INTERNAL MEDICINE | Facility: CLINIC | Age: 79
End: 2019-10-25

## 2019-10-25 DIAGNOSIS — I10 HYPERTENSION: ICD-10-CM

## 2019-10-25 DIAGNOSIS — H93.90: Primary | ICD-10-CM

## 2019-10-25 RX ORDER — LOSARTAN POTASSIUM 50 MG/1
50 TABLET ORAL DAILY
Qty: 30 TABLET | Refills: 12 | Status: SHIPPED | OUTPATIENT
Start: 2019-10-25 | End: 2020-08-14

## 2019-10-25 NOTE — TELEPHONE ENCOUNTER
Let him know we will restart the Losartan at a lower dose-  50 mg.  Sent to [facility].    Happy to refer to ENT-  Ear wash clinic?.  PC

## 2019-10-25 NOTE — TELEPHONE ENCOUNTER
"Pt is calling with a B/P reading of 156/73 today and on yesterday "it was about the same". He was taken off of Losartan several months ago.  He would also like a referral to ENT because his Rt ear is "stopped up".  "

## 2019-10-25 NOTE — TELEPHONE ENCOUNTER
----- Message from Marleni Interiano sent at 10/25/2019 10:35 AM CDT -----  Contact: [phone]  Patient is requesting a call from Ms. Jessica regarding his BP.  Patient stated it has been a little elevated he may medication adjustment.    Please advise, thank you.

## 2019-11-06 NOTE — TELEPHONE ENCOUNTER
Good Morning, Called and spoke with the patient on this morning to offer an appointment for tomorrow or Monday he said that his Ears have cleared up so he doesn't need and appointment       Thank you    2

## 2020-05-06 DIAGNOSIS — I10 ESSENTIAL HYPERTENSION: ICD-10-CM

## 2020-05-07 RX ORDER — METOPROLOL SUCCINATE 100 MG/1
TABLET, EXTENDED RELEASE ORAL
Qty: 90 TABLET | Refills: 0 | Status: SHIPPED | OUTPATIENT
Start: 2020-05-07 | End: 2020-07-09 | Stop reason: SDUPTHER

## 2020-05-07 NOTE — PROGRESS NOTES
Refill Authorization Note    is requesting a refill authorization.    Brief assessment and rationale for refill: APPROVE: prr                Medication reconciliation completed: No                         Comments:      Requested Prescriptions   Pending Prescriptions Disp Refills    metoprolol succinate (TOPROL-XL) 100 MG 24 hr tablet [Pharmacy Med Name: METOPROLOL SUCCINATE  MG Tablet Extended Release 24 Hour] 90 tablet 0     Sig: TAKE 1 TABLET ONE TIME DAILY (NEED MD APPOINTMENT)       Cardiovascular:  Beta Blockers Passed - 5/7/2020 10:51 AM        Passed - Patient is at least 18 years old        Passed - Last BP in normal range within 360 days.     BP Readings from Last 3 Encounters:   05/16/19 130/70   03/25/19 118/60   05/30/18 110/80              Passed - Last Heart Rate in normal range within 360 days.     Pulse Readings from Last 3 Encounters:   05/16/19 54   03/25/19 65   05/30/18 64             Passed - Office visit in past 12 months or future 90 days.     Recent Outpatient Visits            11 months ago Preop exam for internal medicine    [facility] Medicine AMELIE Liu II, MD    1 year ago Eczema, unspecified type    [facility] Medicine Chi Chavez MD    1 year ago Essential hypertension    [facility] Medicine Floyd Moon MD    2 years ago Essential hypertension    [facility] Floyd Moon MD    2 years ago Encounter for preventive health examination    [facility] Internal Medicine SHAWN Rosario                     Appointments  past 12m or future 3m with PCP    Date Provider   Last Visit   5/30/2018 Floyd Moon MD   Next Visit   Visit date not found Floyd Moon MD   ED visits in past 90 days: 0     Note composed:10:51 AM 05/07/2020

## 2020-05-22 DIAGNOSIS — M10.9 GOUTY ARTHRITIS: Primary | ICD-10-CM

## 2020-05-22 DIAGNOSIS — I10 ESSENTIAL HYPERTENSION: ICD-10-CM

## 2020-05-25 NOTE — PROGRESS NOTES
Refill Routing Note    Medication(s) are not appropriate for processing by [facility]:       Patient has not been seen in over 15 months by PCP     Medication-related problems identified:   Requires appointment  Requires labs  Medication Therapy Plan: NTBO (Lipid/CMP/Uric/CBC); Needs Appt (Annual); unclear if pt still follows with pcp; defer to you  Medication reconciliation completed: No      Automatic Epic Protocol Generated Data:    Requested Prescriptions   Pending Prescriptions Disp Refills    amLODIPine (NORVASC) 5 MG tablet [Pharmacy Med Name: AMLODIPINE BESYLATE 5 MG Tablet] 90 tablet 0     Sig: Take 1 tablet (5 mg total) by mouth once daily.       Cardiovascular:  Calcium Channel Blockers Failed - 5/22/2020  5:14 PM        Failed - Last BP in normal range within 360 days.     BP Readings from Last 3 Encounters:   05/16/19 130/70   03/25/19 118/60   05/30/18 110/80              Failed - Office visit in past 12 months or future 90 days.     Recent Outpatient Visits            1 year ago Preop exam for internal medicine    [facility] AMELIE Liu II, MD    1 year ago Eczema, unspecified type    [facility] - [facility] Medicine Chi Chavez MD    1 year ago Essential hypertension    [facility] Floyd Moon MD    2 years ago Essential hypertension    [facility] Floyd Moon MD    2 years ago Encounter for preventive health examination    [facility] SHAWN Rosario                    Passed - Patient is at least 18 years old           Appointments  past 12m or future 3m with PCP    Date Provider   Last Visit   5/30/2018 Floyd Moon MD   Next Visit   Visit date not found Floyd Moon MD   ED visits in past 90 days: 0     Note composed:1:35 PM 05/25/2020

## 2020-05-26 ENCOUNTER — TELEPHONE (OUTPATIENT)
Dept: INTERNAL MEDICINE | Facility: CLINIC | Age: 80
End: 2020-05-26

## 2020-05-26 RX ORDER — AMLODIPINE BESYLATE 5 MG/1
5 TABLET ORAL DAILY
Qty: 90 TABLET | Refills: 3 | Status: SHIPPED | OUTPATIENT
Start: 2020-05-26 | End: 2021-03-19

## 2020-05-26 NOTE — TELEPHONE ENCOUNTER
----- Message from Lorena King sent at 5/26/2020 11:17 AM CDT -----  Contact: Patient [phone]  Caller is requesting an earlier appt than we can schedule.  Caller declined first available appointment listed below. Caller will not accept being placed on the wait list and is requesting a message be sent to the provider.  When is the next available appointment:  n/a  Did you offer to schedule the next available appt and put the patient on the wait list?:   Yes/no  What visit type: epp  Symptoms:    Patient preference of timeframe to be scheduled:  asap  What is the reason the patient is requesting a sooner appointment? (insurance terminating, changing jobs):    Would the patient rather a call back or a response via [facility]?:  Call back  Comments:

## 2020-05-27 NOTE — TELEPHONE ENCOUNTER
----- Message from Diamond Sepulveda sent at 5/27/2020  9:36 AM CDT -----  Type:  Needs Medical Advice    Who Called: keyona       Would the patient rather a call back or a response via [facility]? Call back     Best Call Back Number: [phone]    Additional Information: keyona would like  To speak with the nurse only about his medication

## 2020-05-27 NOTE — TELEPHONE ENCOUNTER
----- Message from Diamond Sepulveda sent at 5/27/2020  9:36 AM CDT -----  Type:  Needs Medical Advice    Who Called: keyona       Would the patient rather a call back or a response via [facility]? Call back     Best Call Back Number: [phone]    Additional Information: keyona would like  To speak with the nurse only about his medication

## 2020-05-28 NOTE — TELEPHONE ENCOUNTER
Provider Staff:     Please schedule patient for Annual and Labs (Lipid/CMP/Uric/CBC)    Please also check with your provider if any further labs need to be added and scheduled together.    Thanks!  [facility]     Appointments  past 12m or future 3m with PCP    Date Provider   Last Visit   5/30/2018 Floyd Moon MD   Next Visit   5/26/2020 Floyd Moon MD     Note composed:11:21 PM 05/27/2020

## 2020-05-29 ENCOUNTER — LAB VISIT (OUTPATIENT)
Dept: LAB | Facility: HOSPITAL | Age: 80
End: 2020-05-29
Attending: INTERNAL MEDICINE
Payer: MEDICARE

## 2020-05-29 DIAGNOSIS — M10.9 GOUTY ARTHRITIS: ICD-10-CM

## 2020-05-29 DIAGNOSIS — I10 ESSENTIAL HYPERTENSION: ICD-10-CM

## 2020-05-29 LAB
ALBUMIN SERPL BCP-MCNC: 4 G/DL (ref 3.5–5.2)
ALP SERPL-CCNC: 68 U/L (ref 55–135)
ALT SERPL W/O P-5'-P-CCNC: 18 U/L (ref 10–44)
ANION GAP SERPL CALC-SCNC: 13 MMOL/L (ref 8–16)
AST SERPL-CCNC: 36 U/L (ref 10–40)
BILIRUB SERPL-MCNC: 0.7 MG/DL (ref 0.1–1)
BUN SERPL-MCNC: 13 MG/DL (ref 8–23)
CALCIUM SERPL-MCNC: 9.3 MG/DL (ref 8.7–10.5)
CHLORIDE SERPL-SCNC: 97 MMOL/L (ref 95–110)
CHOLEST SERPL-MCNC: 146 MG/DL (ref 120–199)
CHOLEST/HDLC SERPL: 2.5 {RATIO} (ref 2–5)
CO2 SERPL-SCNC: 21 MMOL/L (ref 23–29)
CREAT SERPL-MCNC: 1 MG/DL (ref 0.5–1.4)
ERYTHROCYTE [DISTWIDTH] IN BLOOD BY AUTOMATED COUNT: 11.9 % (ref 11.5–14.5)
EST. GFR  (AFRICAN AMERICAN): >60 ML/MIN/1.73 M^2
EST. GFR  (NON AFRICAN AMERICAN): >60 ML/MIN/1.73 M^2
GLUCOSE SERPL-MCNC: 106 MG/DL (ref 70–110)
HCT VFR BLD AUTO: 38.4 % (ref 40–54)
HDLC SERPL-MCNC: 58 MG/DL (ref 40–75)
HDLC SERPL: 39.7 % (ref 20–50)
HGB BLD-MCNC: 12.9 G/DL (ref 14–18)
LDLC SERPL CALC-MCNC: 60.6 MG/DL (ref 63–159)
MCH RBC QN AUTO: 35.6 PG (ref 27–31)
MCHC RBC AUTO-ENTMCNC: 33.6 G/DL (ref 32–36)
MCV RBC AUTO: 106 FL (ref 82–98)
NONHDLC SERPL-MCNC: 88 MG/DL
PLATELET # BLD AUTO: 242 K/UL (ref 150–350)
PMV BLD AUTO: 9.6 FL (ref 9.2–12.9)
POTASSIUM SERPL-SCNC: 4.1 MMOL/L (ref 3.5–5.1)
PROT SERPL-MCNC: 7.3 G/DL (ref 6–8.4)
RBC # BLD AUTO: 3.62 M/UL (ref 4.6–6.2)
SODIUM SERPL-SCNC: 131 MMOL/L (ref 136–145)
TRIGL SERPL-MCNC: 137 MG/DL (ref 30–150)
URATE SERPL-MCNC: 5.3 MG/DL (ref 3.4–7)
WBC # BLD AUTO: 4.75 K/UL (ref 3.9–12.7)

## 2020-05-29 PROCEDURE — 84550 ASSAY OF BLOOD/URIC ACID: CPT | Mod: HCNC

## 2020-05-29 PROCEDURE — 80061 LIPID PANEL: CPT | Mod: HCNC

## 2020-05-29 PROCEDURE — 80053 COMPREHEN METABOLIC PANEL: CPT | Mod: HCNC

## 2020-05-29 PROCEDURE — 85027 COMPLETE CBC AUTOMATED: CPT | Mod: HCNC

## 2020-05-29 PROCEDURE — 36415 COLL VENOUS BLD VENIPUNCTURE: CPT | Mod: HCNC

## 2020-06-02 ENCOUNTER — OFFICE VISIT (OUTPATIENT)
Dept: INTERNAL MEDICINE | Facility: CLINIC | Age: 80
End: 2020-06-02
Payer: MEDICARE

## 2020-06-02 VITALS
HEART RATE: 68 BPM | HEIGHT: 71 IN | BODY MASS INDEX: 28.98 KG/M2 | DIASTOLIC BLOOD PRESSURE: 62 MMHG | WEIGHT: 207 LBS | SYSTOLIC BLOOD PRESSURE: 110 MMHG

## 2020-06-02 DIAGNOSIS — G62.1 NEUROPATHY, ALCOHOLIC: ICD-10-CM

## 2020-06-02 DIAGNOSIS — I10 ESSENTIAL HYPERTENSION: Primary | Chronic | ICD-10-CM

## 2020-06-02 DIAGNOSIS — F10.10 ALCOHOL ABUSE: ICD-10-CM

## 2020-06-02 PROCEDURE — 1101F PT FALLS ASSESS-DOCD LE1/YR: CPT | Mod: HCNC,CPTII,S$GLB, | Performed by: INTERNAL MEDICINE

## 2020-06-02 PROCEDURE — 3074F PR MOST RECENT SYSTOLIC BLOOD PRESSURE < 130 MM HG: ICD-10-PCS | Mod: HCNC,CPTII,S$GLB, | Performed by: INTERNAL MEDICINE

## 2020-06-02 PROCEDURE — 99499 UNLISTED E&M SERVICE: CPT | Mod: HCNC,S$GLB,, | Performed by: INTERNAL MEDICINE

## 2020-06-02 PROCEDURE — 3078F DIAST BP <80 MM HG: CPT | Mod: HCNC,CPTII,S$GLB, | Performed by: INTERNAL MEDICINE

## 2020-06-02 PROCEDURE — 99499 RISK ADDL DX/OHS AUDIT: ICD-10-PCS | Mod: HCNC,S$GLB,, | Performed by: INTERNAL MEDICINE

## 2020-06-02 PROCEDURE — 99999 PR PBB SHADOW E&M-EST. PATIENT-LVL III: CPT | Mod: PBBFAC,HCNC,, | Performed by: INTERNAL MEDICINE

## 2020-06-02 PROCEDURE — 3074F SYST BP LT 130 MM HG: CPT | Mod: HCNC,CPTII,S$GLB, | Performed by: INTERNAL MEDICINE

## 2020-06-02 PROCEDURE — 99214 OFFICE O/P EST MOD 30 MIN: CPT | Mod: HCNC,S$GLB,, | Performed by: INTERNAL MEDICINE

## 2020-06-02 PROCEDURE — 1126F AMNT PAIN NOTED NONE PRSNT: CPT | Mod: HCNC,S$GLB,, | Performed by: INTERNAL MEDICINE

## 2020-06-02 PROCEDURE — 3078F PR MOST RECENT DIASTOLIC BLOOD PRESSURE < 80 MM HG: ICD-10-PCS | Mod: HCNC,CPTII,S$GLB, | Performed by: INTERNAL MEDICINE

## 2020-06-02 PROCEDURE — 1159F MED LIST DOCD IN RCRD: CPT | Mod: HCNC,S$GLB,, | Performed by: INTERNAL MEDICINE

## 2020-06-02 PROCEDURE — 99214 PR OFFICE/OUTPT VISIT, EST, LEVL IV, 30-39 MIN: ICD-10-PCS | Mod: HCNC,S$GLB,, | Performed by: INTERNAL MEDICINE

## 2020-06-02 PROCEDURE — 1159F PR MEDICATION LIST DOCUMENTED IN MEDICAL RECORD: ICD-10-PCS | Mod: HCNC,S$GLB,, | Performed by: INTERNAL MEDICINE

## 2020-06-02 PROCEDURE — 1101F PR PT FALLS ASSESS DOC 0-1 FALLS W/OUT INJ PAST YR: ICD-10-PCS | Mod: HCNC,CPTII,S$GLB, | Performed by: INTERNAL MEDICINE

## 2020-06-02 PROCEDURE — 99999 PR PBB SHADOW E&M-EST. PATIENT-LVL III: ICD-10-PCS | Mod: PBBFAC,HCNC,, | Performed by: INTERNAL MEDICINE

## 2020-06-02 PROCEDURE — 1126F PR PAIN SEVERITY QUANTIFIED, NO PAIN PRESENT: ICD-10-PCS | Mod: HCNC,S$GLB,, | Performed by: INTERNAL MEDICINE

## 2020-06-02 NOTE — PROGRESS NOTES
Subjective:       Patient ID: Ej Miller is a 80 y.o. male.    Chief Complaint: Annual Exam; Hypertension; Hyperglycemia; and Back Pain    Hypertension   This is a chronic problem. The problem is unchanged. The problem is controlled. Pertinent negatives include no chest pain or shortness of breath. The current treatment provides significant improvement. There are no compliance problems.    Back Pain   This is a chronic problem. The problem occurs intermittently. The problem has been waxing and waning since onset. The pain is present in the lumbar spine. The quality of the pain is described as aching. Pertinent negatives include no chest pain.     Review of Systems   Respiratory: Negative for shortness of breath.    Cardiovascular: Negative for chest pain.   Musculoskeletal: Positive for back pain.       Objective:      Physical Exam   Constitutional: He is oriented to person, place, and time. He appears well-developed and well-nourished. No distress.   HENT:   Head: Normocephalic and atraumatic.   Mouth/Throat: Oropharynx is clear and moist.   Eyes: Pupils are equal, round, and reactive to light. Conjunctivae are normal.   Neck: Normal range of motion. Neck supple.   Cardiovascular: Normal rate, regular rhythm and normal heart sounds.   Pulmonary/Chest: Effort normal and breath sounds normal. He has no wheezes.   Abdominal: Soft. Bowel sounds are normal. There is no tenderness.   Musculoskeletal: Normal range of motion. He exhibits no edema or tenderness.   Neurological: He is alert and oriented to person, place, and time. No cranial nerve deficit.   Skin: No erythema.   Psychiatric: He has a normal mood and affect.   Vitals reviewed.      Assessment:       1. Essential hypertension    2. Alcohol abuse    3. Neuropathy, alcoholic        Plan:       Ej was seen today for annual exam, hypertension, hyperglycemia and back pain.    Diagnoses and all orders for this visit:    Essential hypertension  -      CBC auto differential; Future  -     Comprehensive metabolic panel; Future  -     Lipid Panel; Future    Alcohol abuse  Comments:  discussed reduction    Neuropathy, alcoholic  Comments:  offered PT-  declines        Follow up in about 1 year (around 6/2/2021) for PHYSICAL EXAM, WITH LAB BEFORE.

## 2020-06-22 ENCOUNTER — TELEPHONE (OUTPATIENT)
Dept: INTERNAL MEDICINE | Facility: CLINIC | Age: 80
End: 2020-06-22

## 2020-06-22 ENCOUNTER — OFFICE VISIT (OUTPATIENT)
Dept: INTERNAL MEDICINE | Facility: CLINIC | Age: 80
End: 2020-06-22
Payer: MEDICARE

## 2020-06-22 VITALS
BODY MASS INDEX: 29.02 KG/M2 | WEIGHT: 207.25 LBS | TEMPERATURE: 98 F | HEIGHT: 71 IN | SYSTOLIC BLOOD PRESSURE: 138 MMHG | DIASTOLIC BLOOD PRESSURE: 70 MMHG | OXYGEN SATURATION: 95 % | HEART RATE: 64 BPM

## 2020-06-22 DIAGNOSIS — B02.9 HERPES ZOSTER WITHOUT COMPLICATION: Primary | ICD-10-CM

## 2020-06-22 PROCEDURE — 99999 PR PBB SHADOW E&M-EST. PATIENT-LVL IV: ICD-10-PCS | Mod: PBBFAC,HCNC,, | Performed by: FAMILY MEDICINE

## 2020-06-22 PROCEDURE — 1101F PR PT FALLS ASSESS DOC 0-1 FALLS W/OUT INJ PAST YR: ICD-10-PCS | Mod: HCNC,CPTII,S$GLB, | Performed by: FAMILY MEDICINE

## 2020-06-22 PROCEDURE — 99999 PR PBB SHADOW E&M-EST. PATIENT-LVL IV: CPT | Mod: PBBFAC,HCNC,, | Performed by: FAMILY MEDICINE

## 2020-06-22 PROCEDURE — 1159F MED LIST DOCD IN RCRD: CPT | Mod: HCNC,S$GLB,, | Performed by: FAMILY MEDICINE

## 2020-06-22 PROCEDURE — 99214 PR OFFICE/OUTPT VISIT, EST, LEVL IV, 30-39 MIN: ICD-10-PCS | Mod: HCNC,S$GLB,, | Performed by: FAMILY MEDICINE

## 2020-06-22 PROCEDURE — 3078F DIAST BP <80 MM HG: CPT | Mod: HCNC,CPTII,S$GLB, | Performed by: FAMILY MEDICINE

## 2020-06-22 PROCEDURE — 99214 OFFICE O/P EST MOD 30 MIN: CPT | Mod: HCNC,S$GLB,, | Performed by: FAMILY MEDICINE

## 2020-06-22 PROCEDURE — 1126F PR PAIN SEVERITY QUANTIFIED, NO PAIN PRESENT: ICD-10-PCS | Mod: HCNC,S$GLB,, | Performed by: FAMILY MEDICINE

## 2020-06-22 PROCEDURE — 1159F PR MEDICATION LIST DOCUMENTED IN MEDICAL RECORD: ICD-10-PCS | Mod: HCNC,S$GLB,, | Performed by: FAMILY MEDICINE

## 2020-06-22 PROCEDURE — 1126F AMNT PAIN NOTED NONE PRSNT: CPT | Mod: HCNC,S$GLB,, | Performed by: FAMILY MEDICINE

## 2020-06-22 PROCEDURE — 3075F SYST BP GE 130 - 139MM HG: CPT | Mod: HCNC,CPTII,S$GLB, | Performed by: FAMILY MEDICINE

## 2020-06-22 PROCEDURE — 3078F PR MOST RECENT DIASTOLIC BLOOD PRESSURE < 80 MM HG: ICD-10-PCS | Mod: HCNC,CPTII,S$GLB, | Performed by: FAMILY MEDICINE

## 2020-06-22 PROCEDURE — 3075F PR MOST RECENT SYSTOLIC BLOOD PRESS GE 130-139MM HG: ICD-10-PCS | Mod: HCNC,CPTII,S$GLB, | Performed by: FAMILY MEDICINE

## 2020-06-22 PROCEDURE — 1101F PT FALLS ASSESS-DOCD LE1/YR: CPT | Mod: HCNC,CPTII,S$GLB, | Performed by: FAMILY MEDICINE

## 2020-06-22 RX ORDER — VALACYCLOVIR HYDROCHLORIDE 1 G/1
1000 TABLET, FILM COATED ORAL 3 TIMES DAILY
Qty: 21 TABLET | Refills: 0 | Status: SHIPPED | OUTPATIENT
Start: 2020-06-22 | End: 2022-08-18 | Stop reason: ALTCHOICE

## 2020-06-22 NOTE — PATIENT INSTRUCTIONS

## 2020-06-22 NOTE — PROGRESS NOTES
Subjective:       Patient ID: Ej Miller is a 80 y.o. male.    Chief Complaint:   Rash (chest and neck x 4 days)    Rash  This is a new problem. The current episode started in the past 7 days. The problem is unchanged. The affected locations include the chest. The rash is characterized by burning, pain and redness. He was exposed to nothing. Pertinent negatives include no cough, fever or shortness of breath. Past treatments include topical steroids (triamcinolone one application). The treatment provided no relief.     Review of Systems   Constitutional: Negative for activity change, appetite change, chills and fever.   Respiratory: Negative for cough and shortness of breath.    Skin: Positive for rash.     Current Outpatient Medications   Medication Sig    allopurinol (ZYLOPRIM) 100 MG tablet Take 2 tablets (200 mg total) by mouth once daily. (Patient taking differently: Take 200 mg by mouth once daily. 1 daily)    amLODIPine (NORVASC) 5 MG tablet Take 1 tablet (5 mg total) by mouth once daily.    aspirin 81 MG Chew Take 81 mg by mouth once daily.    dorzolamide-timolol 2-0.5% (COSOPT) 22.3-6.8 mg/mL ophthalmic solution     latanoprost 0.005 % ophthalmic solution     losartan (COZAAR) 50 MG tablet Take 1 tablet (50 mg total) by mouth once daily.    metoprolol succinate (TOPROL-XL) 100 MG 24 hr tablet TAKE 1 TABLET ONE TIME DAILY (NEED MD APPOINTMENT) (Patient taking differently: 50 mg. 1/2 tab daily)    MULTIVIT-MIN/FA/LYCOPEN/LUTEIN (MEN 50 PLUS MULTIVITAMIN ORAL) Take by mouth.    BROMSITE 0.075 % Drop INSTILL 1 DROP INTO THE RIGHT EYE TWO TIMES A DAY          No current facility-administered medications for this visit.      Past Medical History:   Diagnosis Date    Cataract     Gastric ulcer; benign     Gout, joint     Hyperglycemia     Nuclear sclerosis of both eyes 9/21/2015    Osteoarthritis of knee     bilaterial    Peptic ulcer     Primary osteoarthritis of both knees 2/17/2017     "Status post total right knee replacement 2/17/2017 3/3/2017     Family History   Problem Relation Age of Onset    Stroke Father     No Known Problems Mother     Alcohol abuse Brother     Stroke Brother     Hypertension Son     Hypertension Son      Social History     Tobacco Use    Smoking status: Never Smoker    Smokeless tobacco: Never Used   Substance Use Topics    Alcohol use: Yes     Frequency: 4 or more times a week     Drinks per session: 3 or 4     Comment: 3-4 scotch daily    Drug use: No       Objective:      Vitals:    06/22/20 1057   BP: 138/70   BP Location: Right arm   Patient Position: Sitting   BP Method: Large (Manual)   Pulse: 64   Temp: 98.3 °F (36.8 °C)   SpO2: 95%   Weight: 94 kg (207 lb 3.7 oz)   Height: 5' 11" (1.803 m)     Physical Exam  Vitals signs and nursing note reviewed.   Constitutional:       General: He is not in acute distress.     Appearance: He is well-developed. He is not diaphoretic.   HENT:      Head: Normocephalic and atraumatic.   Eyes:      Conjunctiva/sclera: Conjunctivae normal.   Neck:      Musculoskeletal: Normal range of motion and neck supple.   Cardiovascular:      Rate and Rhythm: Normal rate and regular rhythm.      Heart sounds: Normal heart sounds. No murmur. No friction rub. No gallop.    Pulmonary:      Effort: Pulmonary effort is normal.      Breath sounds: Normal breath sounds. No wheezing or rales.   Musculoskeletal:         General: No deformity.   Skin:     General: Skin is warm and dry.      Comments: Red, papular rash bilateral chest. R>L  Few papules on R upper arm and R shoulder and R upper back.  None on L arm, shoulder, or back.   Neurological:      Mental Status: He is alert and oriented to person, place, and time.   Psychiatric:         Behavior: Behavior normal.              Assessment and Plan:     Herpes zoster without complication  -     valACYclovir (VALTREX) 1000 MG tablet; Take 1 tablet (1,000 mg total) by mouth 3 (three) times " daily. for 7 days  Dispense: 21 tablet; Refill: 0    May continue to use triamcinolone cream.     Follow up if symptoms worsen or fail to improve.      Roshan Croft MD

## 2020-06-22 NOTE — TELEPHONE ENCOUNTER
----- Message from Mariam Chang sent at 6/22/2020  7:58 AM CDT -----  Regarding: Pt self Home [phone]  Patient is calling in regards to him saying that he have a severe rash on his chest and neck and he said that its been there for four days now. Patient would like a call back to speak with you about this please.

## 2020-07-01 ENCOUNTER — TELEPHONE (OUTPATIENT)
Dept: INTERNAL MEDICINE | Facility: CLINIC | Age: 80
End: 2020-07-01

## 2020-07-01 ENCOUNTER — OFFICE VISIT (OUTPATIENT)
Dept: INTERNAL MEDICINE | Facility: CLINIC | Age: 80
End: 2020-07-01
Payer: MEDICARE

## 2020-07-01 VITALS
HEART RATE: 68 BPM | DIASTOLIC BLOOD PRESSURE: 64 MMHG | SYSTOLIC BLOOD PRESSURE: 130 MMHG | HEIGHT: 71 IN | WEIGHT: 200 LBS | BODY MASS INDEX: 28 KG/M2

## 2020-07-01 DIAGNOSIS — L03.313 CELLULITIS OF CHEST WALL: Primary | ICD-10-CM

## 2020-07-01 PROCEDURE — 99999 PR PBB SHADOW E&M-EST. PATIENT-LVL IV: CPT | Mod: PBBFAC,HCNC,, | Performed by: INTERNAL MEDICINE

## 2020-07-01 PROCEDURE — 1100F PTFALLS ASSESS-DOCD GE2>/YR: CPT | Mod: HCNC,CPTII,S$GLB, | Performed by: INTERNAL MEDICINE

## 2020-07-01 PROCEDURE — 99999 PR PBB SHADOW E&M-EST. PATIENT-LVL IV: ICD-10-PCS | Mod: PBBFAC,HCNC,, | Performed by: INTERNAL MEDICINE

## 2020-07-01 PROCEDURE — 1100F PR PT FALLS ASSESS DOC 2+ FALLS/FALL W/INJURY/YR: ICD-10-PCS | Mod: HCNC,CPTII,S$GLB, | Performed by: INTERNAL MEDICINE

## 2020-07-01 PROCEDURE — 99213 OFFICE O/P EST LOW 20 MIN: CPT | Mod: HCNC,S$GLB,, | Performed by: INTERNAL MEDICINE

## 2020-07-01 PROCEDURE — 3288F FALL RISK ASSESSMENT DOCD: CPT | Mod: HCNC,CPTII,S$GLB, | Performed by: INTERNAL MEDICINE

## 2020-07-01 PROCEDURE — 1159F MED LIST DOCD IN RCRD: CPT | Mod: HCNC,S$GLB,, | Performed by: INTERNAL MEDICINE

## 2020-07-01 PROCEDURE — 3078F PR MOST RECENT DIASTOLIC BLOOD PRESSURE < 80 MM HG: ICD-10-PCS | Mod: HCNC,CPTII,S$GLB, | Performed by: INTERNAL MEDICINE

## 2020-07-01 PROCEDURE — 3078F DIAST BP <80 MM HG: CPT | Mod: HCNC,CPTII,S$GLB, | Performed by: INTERNAL MEDICINE

## 2020-07-01 PROCEDURE — 1159F PR MEDICATION LIST DOCUMENTED IN MEDICAL RECORD: ICD-10-PCS | Mod: HCNC,S$GLB,, | Performed by: INTERNAL MEDICINE

## 2020-07-01 PROCEDURE — 3075F PR MOST RECENT SYSTOLIC BLOOD PRESS GE 130-139MM HG: ICD-10-PCS | Mod: HCNC,CPTII,S$GLB, | Performed by: INTERNAL MEDICINE

## 2020-07-01 PROCEDURE — 3288F PR FALLS RISK ASSESSMENT DOCUMENTED: ICD-10-PCS | Mod: HCNC,CPTII,S$GLB, | Performed by: INTERNAL MEDICINE

## 2020-07-01 PROCEDURE — 99213 PR OFFICE/OUTPT VISIT, EST, LEVL III, 20-29 MIN: ICD-10-PCS | Mod: HCNC,S$GLB,, | Performed by: INTERNAL MEDICINE

## 2020-07-01 PROCEDURE — 3075F SYST BP GE 130 - 139MM HG: CPT | Mod: HCNC,CPTII,S$GLB, | Performed by: INTERNAL MEDICINE

## 2020-07-01 RX ORDER — CEPHALEXIN 500 MG/1
500 CAPSULE ORAL 4 TIMES DAILY
Qty: 28 CAPSULE | Refills: 0 | Status: SHIPPED | OUTPATIENT
Start: 2020-07-01 | End: 2020-07-08

## 2020-07-01 NOTE — PATIENT INSTRUCTIONS
Please schedule a follow up with Ophthalmology Dr Cervantes for evaluation.    Avoid the triamcinolone cream. Instead use an over-the-counter lotion like Lubriderm, Aveeno, Eucerin, etc, to help make sure the skin is healing well and doesn't have dryness and cracking.    If by Thursday there's no improvement, please notify the office so we can get you set up with Dermatology.

## 2020-07-01 NOTE — PROGRESS NOTES
"Subjective:       Patient ID: Ej Miller is a 80 y.o. male.    Chief Complaint: Herpes Zoster      Patient of Floyd Moon MD, here today for Urgent Care visit. Patient is accompanied by his wife. Last visit with Internal Medicine 6/22/2020.    HPI    Seen by Internal Medicine about 10 days ago for zoster. Completed course of Valtrex.  Patient reports that the initial rash has improved, however he has developed redness and warmth in the area that is spreading up into the neck and face.  No history of significant skin problems.  He had a minor rash 1 year ago that resolved with Kenalog cream.  No longer is having any sweating or fevers.  He is concerned because the rash is near his eye and he notes that his eyes are watering and running.    Review of Systems   All other systems reviewed and are negative.      Objective:      Physical Exam  Vitals signs and nursing note reviewed.   Constitutional:       Appearance: He is not ill-appearing.   Skin:     General: Skin is warm.      Findings: Erythema and rash present.      Comments: There is skin redness with warmth in the upper torso, starting at the R neck midline, extending into the R upper arm, spreading through the neck and into the chest across the midline in to the L side. There is erythema and flaking in the face and scalp as well.   Neurological:      Mental Status: He is alert.         Vitals:    07/01/20 0910   BP: 130/64   BP Location: Right arm   Patient Position: Sitting   BP Method: Large (Manual)   Pulse: 68   Weight: 90.7 kg (200 lb)   Height: 5' 11" (1.803 m)     Body mass index is 27.89 kg/m².    RESULTS: Reviewed labs from last 6 months    Assessment:       1. Cellulitis of chest wall        Plan:   Ej was seen today for herpes zoster.    Diagnoses and all orders for this visit:    Cellulitis of chest wall:  New problem, likely due to recent zoster outbreak, Keflex for 1 week, if no improvement refer to Dermatology. Use emollient " over-the-counter as well.  -     cephALEXin (KEFLEX) 500 MG capsule; Take 1 capsule (500 mg total) by mouth 4 (four) times daily. for 7 days      Keep regular follow up appointments with Floyd Moon MD.  Trevor Brooke MD  Internal Medicine    Portions of this note were completed using medical dictation software. Please excuse typographical or syntax errors that were missed on review.

## 2020-07-01 NOTE — TELEPHONE ENCOUNTER
Recently seen by Dr Croft for shingles, Pt c/o of rash on neck and face now near eyes. Appt booked today

## 2020-07-01 NOTE — TELEPHONE ENCOUNTER
----- Message from Imani Anders sent at 7/1/2020  7:11 AM CDT -----  Contact: patient [phone]  Patient wants to speak with Aracely about a rash he has. He saw Dr.Hayden Croft last week and was diagnosis with shingles. It has been three weeks since he was diagnosed . Patient feels he would like to see  about this. Please call him. Pt doesn't feel that he is getting any better and the rash has spread to is face.

## 2020-07-09 ENCOUNTER — TELEPHONE (OUTPATIENT)
Dept: INTERNAL MEDICINE | Facility: CLINIC | Age: 80
End: 2020-07-09

## 2020-07-09 DIAGNOSIS — R21 SKIN RASH: Primary | ICD-10-CM

## 2020-07-09 DIAGNOSIS — I10 ESSENTIAL HYPERTENSION: ICD-10-CM

## 2020-07-09 NOTE — TELEPHONE ENCOUNTER
----- Message from Miugel Serrano sent at 7/9/2020 10:17 AM CDT -----  Contact: self   Patient state he want to speak to the nurse about an appointment. Patient is asking for Aracely to set up his appointment. Please call and advise.

## 2020-07-09 NOTE — TELEPHONE ENCOUNTER
----- Message from Anastacio Castrejon sent at 7/9/2020  4:12 PM CDT -----  Contact: [employer]- [phone]  Prescription refill request.  RX name and strength: metoprolol succinate (TOPROL-XL) 100 MG 24 hr tablet    Directions: TAKE 1 TABLET ONE TIME DAILY (NEED MD APPOINTMENT)    Is this a 30 day or 90 day RX:  90 day    Local pharmacy or mail order pharmacy:  Mail     Pharmacy name and phone #: [facility] - [facility] [address] [phone] (Phone)  [phone] (Fax)        Additional information:   [facility] states meds are out of refills.

## 2020-07-09 NOTE — TELEPHONE ENCOUNTER
You saw Mr Angela johnson on 7/01  he states he is not much better. Would like a referral to derm as you suggested if not any better.Thanks

## 2020-07-09 NOTE — TELEPHONE ENCOUNTER
Pt states he is going to try an outside dermatologist and if he can't get an appointment will keep the one for Monday. He will call to let us know if he needs to cancel this appt.

## 2020-07-09 NOTE — TELEPHONE ENCOUNTER
----- Message from Maria R Lubin sent at 7/9/2020  8:03 AM CDT -----  Contact: self [phone]  Patient would like to get a referral.  Referral to what specialty:  Dermatology  Does the patient want the referral with a specific physician:  no  [facility] the [facility]  Reason (be specific):  shingles  Does the patient already have the specialty clinic appointment scheduled:  no  If yes, what date is the appointment scheduled:     Is the insurance listed in Epic correct? (this is important for a referral):  yes  Comments:

## 2020-07-10 RX ORDER — METOPROLOL SUCCINATE 100 MG/1
TABLET, EXTENDED RELEASE ORAL
Qty: 90 TABLET | Refills: 0 | Status: SHIPPED | OUTPATIENT
Start: 2020-07-10 | End: 2022-02-23 | Stop reason: SDUPTHER

## 2020-07-13 ENCOUNTER — OFFICE VISIT (OUTPATIENT)
Dept: DERMATOLOGY | Facility: CLINIC | Age: 80
End: 2020-07-13
Payer: MEDICARE

## 2020-07-13 DIAGNOSIS — R21 SKIN RASH: ICD-10-CM

## 2020-07-13 DIAGNOSIS — L30.9 ECZEMA, UNSPECIFIED TYPE: Primary | ICD-10-CM

## 2020-07-13 PROCEDURE — 1100F PTFALLS ASSESS-DOCD GE2>/YR: CPT | Mod: HCNC,CPTII,S$GLB, | Performed by: DERMATOLOGY

## 2020-07-13 PROCEDURE — 99202 PR OFFICE/OUTPT VISIT, NEW, LEVL II, 15-29 MIN: ICD-10-PCS | Mod: HCNC,S$GLB,, | Performed by: DERMATOLOGY

## 2020-07-13 PROCEDURE — 1126F PR PAIN SEVERITY QUANTIFIED, NO PAIN PRESENT: ICD-10-PCS | Mod: HCNC,S$GLB,, | Performed by: DERMATOLOGY

## 2020-07-13 PROCEDURE — 99999 PR PBB SHADOW E&M-EST. PATIENT-LVL III: CPT | Mod: PBBFAC,HCNC,, | Performed by: DERMATOLOGY

## 2020-07-13 PROCEDURE — 3288F PR FALLS RISK ASSESSMENT DOCUMENTED: ICD-10-PCS | Mod: HCNC,CPTII,S$GLB, | Performed by: DERMATOLOGY

## 2020-07-13 PROCEDURE — 1159F MED LIST DOCD IN RCRD: CPT | Mod: HCNC,S$GLB,, | Performed by: DERMATOLOGY

## 2020-07-13 PROCEDURE — 99999 PR PBB SHADOW E&M-EST. PATIENT-LVL III: ICD-10-PCS | Mod: PBBFAC,HCNC,, | Performed by: DERMATOLOGY

## 2020-07-13 PROCEDURE — 3288F FALL RISK ASSESSMENT DOCD: CPT | Mod: HCNC,CPTII,S$GLB, | Performed by: DERMATOLOGY

## 2020-07-13 PROCEDURE — 1159F PR MEDICATION LIST DOCUMENTED IN MEDICAL RECORD: ICD-10-PCS | Mod: HCNC,S$GLB,, | Performed by: DERMATOLOGY

## 2020-07-13 PROCEDURE — 1100F PR PT FALLS ASSESS DOC 2+ FALLS/FALL W/INJURY/YR: ICD-10-PCS | Mod: HCNC,CPTII,S$GLB, | Performed by: DERMATOLOGY

## 2020-07-13 PROCEDURE — 99202 OFFICE O/P NEW SF 15 MIN: CPT | Mod: HCNC,S$GLB,, | Performed by: DERMATOLOGY

## 2020-07-13 PROCEDURE — 1126F AMNT PAIN NOTED NONE PRSNT: CPT | Mod: HCNC,S$GLB,, | Performed by: DERMATOLOGY

## 2020-07-13 RX ORDER — TRIAMCINOLONE ACETONIDE 1 MG/G
OINTMENT TOPICAL
Qty: 454 G | Refills: 1 | Status: SHIPPED | OUTPATIENT
Start: 2020-07-13 | End: 2021-09-24 | Stop reason: ALTCHOICE

## 2020-07-13 NOTE — LETTER
July 13, 2020      Trevor Brooke MD  [address]  [facility] [address]           [facility] D[facility]at[facility]  [address]  [place] [address]  Phone: [phone]  Fax: [phone]          Patient: Ej Miller   MR Number: [id number]   YOB: 1940   Date of Visit: 7/13/2020       Dear Dr. Trevor Brooke:    Thank you for referring Ej Miller to me for evaluation. Attached you will find relevant portions of my assessment and plan of care.    If you have questions, please do not hesitate to call me. I look forward to following Ej Miller along with you.    Sincerely,    Lisette Taylor MD    Enclosure  CC:  No Recipients    If you would like to receive this communication electronically, please contact [email or web address] or [phone] to request more information on [employer] access.    For providers and/or their staff who would like to refer a patient to [facility], please contact us through our one-stop-shop provider referral line, [facility], at [phone].    If you feel you have received this communication in error or would no longer like to receive these types of communications, please e-mail [email or web address]

## 2020-07-13 NOTE — PROGRESS NOTES
Subjective:       Patient ID:  Ej Miller is a 80 y.o. male who presents for   Chief Complaint   Patient presents with    Itching     Pt here today for itching. Was told by Dr. Bill stated it to be possible shingles x 1mo. To chest, back, and neck area.  Didn't clear with valtrex so then was treated for cellulitis with abx.  Still did not improve and very itchy.  Does have a little on his arms.  Prev tx with antibiotics. No relief.    Itching        Review of Systems   Skin: Positive for itching. Negative for daily sunscreen use, activity-related sunscreen use and tendency to form keloidal scars.   Hematologic/Lymphatic: Does not bruise/bleed easily.        Objective:    Physical Exam   Constitutional: He appears well-developed and well-nourished. No distress.   Neurological: He is alert and oriented to person, place, and time. He is not disoriented.   Psychiatric: He has a normal mood and affect.   Skin:   Areas Examined (abnormalities noted in diagram):   Neck Inspection Performed  Chest / Axilla Inspection Performed  Back Inspection Performed              Diagram Legend     Erythematous scaling macule/papule c/w actinic keratosis       Vascular papule c/w angioma      Pigmented verrucoid papule/plaque c/w seborrheic keratosis      Yellow umbilicated papule c/w sebaceous hyperplasia      Irregularly shaped tan macule c/w lentigo     1-2 mm smooth white papules consistent with Milia      Movable subcutaneous cyst with punctum c/w epidermal inclusion cyst      Subcutaneous movable cyst c/w pilar cyst      Firm pink to brown papule c/w dermatofibroma      Pedunculated fleshy papule(s) c/w skin tag(s)      Evenly pigmented macule c/w junctional nevus     Mildly variegated pigmented, slightly irregular-bordered macule c/w mildly atypical nevus      Flesh colored to evenly pigmented papule c/w intradermal nevus       Pink pearly papule/plaque c/w basal cell carcinoma      Erythematous  hyperkeratotic cursted plaque c/w SCC      Surgical scar with no sign of skin cancer recurrence      Open and closed comedones      Inflammatory papules and pustules      Verrucoid papule consistent consistent with wart     Erythematous eczematous patches and plaques     Dystrophic onycholytic nail with subungual debris c/w onychomycosis     Umbilicated papule    Erythematous-base heme-crusted tan verrucoid plaque consistent with inflamed seborrheic keratosis     Erythematous Silvery Scaling Plaque c/w Psoriasis     See annotation      Assessment / Plan:        Eczema, unspecified type  -     triamcinolone acetonide 0.1% (KENALOG) 0.1 % ointment; AAA bid; not more than 2 weeks straight in same location, avoid use on face and groin  Dispense: 454 g; Refill: 1  Discussed with patient good skin care regimen including avoiding fragranced products and very hot showers.  Recommended dove sensitive skin bar soap or cerave hydrating cleanser or bar.  Recommend Cerave cream  For moisturization daily -2x daily.   Avoid all other topicals  Avoid sunlight to this area  Safflower oil     Skin rash  -     Ambulatory referral/consult to Dermatology             Follow up in about 4 weeks (around 8/10/2020).

## 2020-08-14 DIAGNOSIS — I10 HYPERTENSION: ICD-10-CM

## 2020-08-14 RX ORDER — LOSARTAN POTASSIUM 50 MG/1
TABLET ORAL
Qty: 90 TABLET | Refills: 3 | Status: SHIPPED | OUTPATIENT
Start: 2020-08-14 | End: 2021-05-27 | Stop reason: SDUPTHER

## 2020-08-14 NOTE — TELEPHONE ENCOUNTER
No new care gaps identified.  Powered by [employer]. Reference number: [id number]. 8/14/2020 1:36:32 PM CDT

## 2020-08-15 NOTE — PROGRESS NOTES
Refill Authorization Note     is requesting a refill authorization.    Brief assessment and rationale for refill: APPROVE: prr          Medication Therapy Plan: CDMR.     Medication reconciliation completed: No                         Comments:   Automatic Epic Protocol Generated Data:    Requested Prescriptions   Signed Prescriptions Disp Refills    losartan (COZAAR) 50 MG tablet 90 tablet 3     Sig: TAKE 1 TABLET EVERY DAY       Cardiovascular:  Angiotensin Receptor Blockers Passed - 8/14/2020  1:36 PM        Passed - Patient is at least 18 years old        Passed - Last BP in normal range within 360 days.     BP Readings from Last 3 Encounters:   07/01/20 130/64   06/22/20 138/70   06/02/20 110/62              Passed - Office visit in past 12 months or future 90 days.     Recent Outpatient Visits            1 month ago Eczema, unspecified type    [place] - Dermatology Lisette Taylor MD    1 month ago Cellulitis of chest wall    [facility] Internal Medicine Trevor Brooke MD    1 month ago Herpes zoster without complication    [facility] Internal Medicine Roshan Croft MD    2 months ago Essential hypertension    [facility] Medicine Floyd Moon MD    1 year ago Preop exam for internal medicine    [facility] Internal Medicine AMELIE Liu II, MD          Future Appointments              In 1 week Zainab Florence NP [place] [facility] [place], [place]    In 1 week Lisette Taylor MD [place] [facility], [place]                Passed - Cr is 1.3 or below and within 360 days     Creatinine   Date Value Ref Range Status   05/29/2020 1.0 0.5 - 1.4 mg/dL Final   06/07/2018 0.9 0.5 - 1.4 mg/dL Final   05/30/2018 1.1 0.5 - 1.4 mg/dL Final              Passed - K in normal range and within 360 days     Potassium   Date Value Ref Range Status   05/29/2020 4.1 3.5 - 5.1 mmol/L Final   06/07/2018 4.1 3.5 - 5.1 mmol/L Final   05/30/2018 4.9 3.5 - 5.1 mmol/L Final              Passed -  eGFR within 360 days     eGFR if non    Date Value Ref Range Status   05/29/2020 >60.0 >60 mL/min/1.73 m^2 Final     Comment:     Calculation used to obtain the estimated glomerular filtration  rate (eGFR) is the CKD-EPI equation.      06/07/2018 >60 >60 mL/min/1.73 m^2 Final     Comment:     Calculation used to obtain the estimated glomerular filtration  rate (eGFR) is the CKD-EPI equation.      05/30/2018 >60.0 >60 mL/min/1.73 m^2 Final     Comment:     Calculation used to obtain the estimated glomerular filtration  rate (eGFR) is the CKD-EPI equation.        eGFR if    Date Value Ref Range Status   05/29/2020 >60.0 >60 mL/min/1.73 m^2 Final   06/07/2018 >60 >60 mL/min/1.73 m^2 Final   05/30/2018 >60.0 >60 mL/min/1.73 m^2 Final                    Appointments  past 12m or future 3m with PCP    Date Provider   Last Visit   6/2/2020 Floyd Moon MD   Next Visit   Visit date not found Floyd Moon MD   ED visits in past 90 days: 0     Note composed:10:37 PM 08/14/2020

## 2020-08-18 DIAGNOSIS — M10.9 GOUTY ARTHRITIS: ICD-10-CM

## 2020-08-18 RX ORDER — ALLOPURINOL 100 MG/1
200 TABLET ORAL DAILY
Qty: 180 TABLET | Refills: 3 | Status: SHIPPED | OUTPATIENT
Start: 2020-08-18 | End: 2023-06-20

## 2020-08-18 NOTE — TELEPHONE ENCOUNTER
----- Message from Raeann Arevalo sent at 8/18/2020 10:30 AM CDT -----  Regarding: refill  Contact: Patient [phone]  Requesting an RX refill or new RX.  Is this a refill or new RX: refill   RX name and strength: allopurinol (ZYLOPRIM) 100 MG tablet  Directions (copy/paste from chart):   Take 2 tablets (200 mg total) by mouth once daily. - Oral  Is this a 30 day or 90 day RX:  30  Local pharmacy or mail order pharmacy:  mail order  Pharmacy name and phone # [facility] - [place], [place] - [address] [phone] (Phone) [phone] (Fax)

## 2020-08-18 NOTE — TELEPHONE ENCOUNTER
No new care gaps identified.  Powered by [employer]. Reference number: [id number]. 8/18/2020 11:15:10 AM   CDT

## 2020-08-24 ENCOUNTER — TELEPHONE (OUTPATIENT)
Dept: DERMATOLOGY | Facility: CLINIC | Age: 80
End: 2020-08-24

## 2020-08-24 NOTE — TELEPHONE ENCOUNTER
----- Message from Lydia Ibanez sent at 8/24/2020 12:28 PM CDT -----  Regarding: pt  HUDSON - pt Patient Requesting Sooner Appointment.     Reason for sooner appt.: pt is calling to speak with the nurse pt needs to be seen for a rash   When is the first available appointment? 11/2020  Communication Preference: can you please call pt at  [phone]     Additional Information: none    EMMETT

## 2020-08-25 ENCOUNTER — OFFICE VISIT (OUTPATIENT)
Dept: DERMATOLOGY | Facility: CLINIC | Age: 80
End: 2020-08-25
Payer: MEDICARE

## 2020-08-25 VITALS — BODY MASS INDEX: 27.89 KG/M2 | WEIGHT: 200 LBS

## 2020-08-25 DIAGNOSIS — L30.9 DERMATITIS: Primary | ICD-10-CM

## 2020-08-25 PROCEDURE — 99213 PR OFFICE/OUTPT VISIT, EST, LEVL III, 20-29 MIN: ICD-10-PCS | Mod: HCNC,S$GLB,, | Performed by: DERMATOLOGY

## 2020-08-25 PROCEDURE — 1159F MED LIST DOCD IN RCRD: CPT | Mod: HCNC,S$GLB,, | Performed by: DERMATOLOGY

## 2020-08-25 PROCEDURE — 1101F PT FALLS ASSESS-DOCD LE1/YR: CPT | Mod: HCNC,CPTII,S$GLB, | Performed by: DERMATOLOGY

## 2020-08-25 PROCEDURE — 1126F AMNT PAIN NOTED NONE PRSNT: CPT | Mod: HCNC,S$GLB,, | Performed by: DERMATOLOGY

## 2020-08-25 PROCEDURE — 99999 PR PBB SHADOW E&M-EST. PATIENT-LVL III: ICD-10-PCS | Mod: PBBFAC,HCNC,, | Performed by: DERMATOLOGY

## 2020-08-25 PROCEDURE — 1101F PR PT FALLS ASSESS DOC 0-1 FALLS W/OUT INJ PAST YR: ICD-10-PCS | Mod: HCNC,CPTII,S$GLB, | Performed by: DERMATOLOGY

## 2020-08-25 PROCEDURE — 1159F PR MEDICATION LIST DOCUMENTED IN MEDICAL RECORD: ICD-10-PCS | Mod: HCNC,S$GLB,, | Performed by: DERMATOLOGY

## 2020-08-25 PROCEDURE — 99999 PR PBB SHADOW E&M-EST. PATIENT-LVL III: CPT | Mod: PBBFAC,HCNC,, | Performed by: DERMATOLOGY

## 2020-08-25 PROCEDURE — 1126F PR PAIN SEVERITY QUANTIFIED, NO PAIN PRESENT: ICD-10-PCS | Mod: HCNC,S$GLB,, | Performed by: DERMATOLOGY

## 2020-08-25 PROCEDURE — 99213 OFFICE O/P EST LOW 20 MIN: CPT | Mod: HCNC,S$GLB,, | Performed by: DERMATOLOGY

## 2020-08-25 RX ORDER — BETAMETHASONE DIPROPIONATE 0.5 MG/G
CREAM TOPICAL
Qty: 90 G | Refills: 3 | Status: SHIPPED | OUTPATIENT
Start: 2020-08-25

## 2020-08-25 RX ORDER — PREDNISONE 20 MG/1
20 TABLET ORAL DAILY
Qty: 15 TABLET | Refills: 0 | Status: SHIPPED | OUTPATIENT
Start: 2020-08-25 | End: 2020-09-09

## 2020-08-25 NOTE — PROGRESS NOTES
"  Subjective:       Patient ID:  Ej Miller is a 80 y.o. male who presents for   Chief Complaint   Patient presents with    Rash    Itching     Hx 7/13 " Pt here today for itching. Was told by Dr. Brooke and Red stated it to be possible shingles x 1mo. To chest, back, and neck area.  Didn't clear with valtrex so then was treated for cellulitis with abx.  Still did not improve and very itchy.  Does have a little on his arms.  Prev tx with antibiotics. No relief.  Eczema, unspecified type  -     triamcinolone acetonide 0.1% (KENALOG) 0.1 % ointment; AAA bid; not more than 2 weeks straight in same location, avoid use on face and groin  Dispense: 454 g; Refill: 1  Discussed with patient good skin care regimen including avoiding fragranced products and very hot showers.  Recommended dove sensitive skin bar soap or cerave hydrating cleanser or bar.  Recommend Cerave cream  For moisturization daily -2x daily.   Avoid all other topicals  Avoid sunlight to this area  Safflower oil "    No better with above, now also on back, arms and thighs.  No medication changes on zyloprim for years. His wife uses fragrance sprays which give him sinus congestion, he bought an air purifier for this.      Rash - Follow-up  Affected locations: left arm, right arm, chest, torso, right upper leg, left upper leg and groin  Signs / symptoms: itching and irritated    Itching        Review of Systems   Constitutional: Negative for fever, chills, weight loss, weight gain, fatigue, night sweats and malaise.   Skin: Positive for itching, rash and wears hat. Negative for daily sunscreen use and activity-related sunscreen use.   Hematologic/Lymphatic: Does not bruise/bleed easily.        Objective:    Physical Exam   Constitutional: He appears well-developed and well-nourished. No distress.   Neurological: He is alert and oriented to person, place, and time. He is not disoriented.   Psychiatric: He has a normal mood and affect.   Skin: "   Areas Examined (abnormalities noted in diagram):   Head / Face Inspection Performed  Neck Inspection Performed  Chest / Axilla Inspection Performed  Abdomen Inspection Performed  Back Inspection Performed  RUE Inspected  LUE Inspection Performed  RLE Inspected  LLE Inspection Performed              Diagram Legend     Erythematous scaling macule/papule c/w actinic keratosis       Vascular papule c/w angioma      Pigmented verrucoid papule/plaque c/w seborrheic keratosis      Yellow umbilicated papule c/w sebaceous hyperplasia      Irregularly shaped tan macule c/w lentigo     1-2 mm smooth white papules consistent with Milia      Movable subcutaneous cyst with punctum c/w epidermal inclusion cyst      Subcutaneous movable cyst c/w pilar cyst      Firm pink to brown papule c/w dermatofibroma      Pedunculated fleshy papule(s) c/w skin tag(s)      Evenly pigmented macule c/w junctional nevus     Mildly variegated pigmented, slightly irregular-bordered macule c/w mildly atypical nevus      Flesh colored to evenly pigmented papule c/w intradermal nevus       Pink pearly papule/plaque c/w basal cell carcinoma      Erythematous hyperkeratotic cursted plaque c/w SCC      Surgical scar with no sign of skin cancer recurrence      Open and closed comedones      Inflammatory papules and pustules      Verrucoid papule consistent consistent with wart     Erythematous eczematous patches and plaques     Dystrophic onycholytic nail with subungual debris c/w onychomycosis     Umbilicated papule    Erythematous-base heme-crusted tan verrucoid plaque consistent with inflamed seborrheic keratosis     Erythematous Silvery Scaling Plaque c/w Psoriasis     See annotation      Assessment / Plan:        Dermatitis, probable allergic    -     betamethasone dipropionate (DIPROLENE) 0.05 % cream; Use bid prn rash  Dispense: 90 g; Refill: 3  -     predniSONE (DELTASONE) 20 MG tablet; Take 1 tablet (20 mg total) by mouth once daily. for 15 days   Dispense: 15 tablet; Refill: 0  Told needs to stay home while on this due to covid  Fragrance free products, wife needs to dc use around him  Free and clear shampoo  cerave cleanser  Free and clear detergent    Consider dc of zyloprim  Consider patch tests if recurrence              No follow-ups on file.

## 2020-09-01 ENCOUNTER — TELEPHONE (OUTPATIENT)
Dept: DERMATOLOGY | Facility: CLINIC | Age: 80
End: 2020-09-01

## 2020-09-08 ENCOUNTER — TELEPHONE (OUTPATIENT)
Dept: DERMATOLOGY | Facility: CLINIC | Age: 80
End: 2020-09-08

## 2020-09-08 RX ORDER — CLOBETASOL PROPIONATE 0.5 MG/G
CREAM TOPICAL 2 TIMES DAILY
Qty: 60 G | Refills: 3 | Status: SHIPPED | OUTPATIENT
Start: 2020-09-08

## 2020-09-08 NOTE — TELEPHONE ENCOUNTER
----- Message from Sara Babcock MD sent at 9/8/2020  1:55 PM CDT -----  Regarding: RE: pt advice  You can tell him to stop his zyloprim, I have oked it with Dr Moon.  Also I called in a stronger cream for him.   ----- Message -----  From: Naila Bishop MA  Sent: 9/8/2020   1:37 PM CDT  To: Sara Babcock MD  Subject: FW: pt advice                                    Dr. Babcock Mrs. Miller stated that he has taken all the Prednisone and it's still not helping.                              Dermatitis, probable allergic    -     betamethasone dipropionate (DIPROLENE) 0.05 % cream; Use bid prn rash  Dispense: 90 g; Refill: 3  -     predniSONE (DELTASONE) 20 MG tablet; Take 1 tablet (20 mg total) by mouth once daily. for 15 days  Dispense: 15 tablet; Refill: 0  Told needs to stay home while on this due to covid  ----- Message -----  From: Allyson Curry  Sent: 9/8/2020   9:08 AM CDT  To: Sadiq HERNANDEZ Staff  Subject: pt advice                                        Pt would like to speak with  regarding a severe rash. Please give pt a call back at [phone] .

## 2020-11-11 ENCOUNTER — PES CALL (OUTPATIENT)
Dept: ADMINISTRATIVE | Facility: CLINIC | Age: 80
End: 2020-11-11

## 2021-03-17 DIAGNOSIS — I10 ESSENTIAL HYPERTENSION: ICD-10-CM

## 2021-03-19 RX ORDER — AMLODIPINE BESYLATE 5 MG/1
TABLET ORAL
Qty: 90 TABLET | Refills: 0 | Status: SHIPPED | OUTPATIENT
Start: 2021-03-19 | End: 2021-05-27 | Stop reason: SDUPTHER

## 2021-05-27 DIAGNOSIS — I10 ESSENTIAL HYPERTENSION: ICD-10-CM

## 2021-05-27 DIAGNOSIS — I10 HYPERTENSION: ICD-10-CM

## 2021-05-27 RX ORDER — AMLODIPINE BESYLATE 5 MG/1
5 TABLET ORAL DAILY
Qty: 90 TABLET | Refills: 3 | Status: SHIPPED | OUTPATIENT
Start: 2021-05-27 | End: 2022-03-14 | Stop reason: SDUPTHER

## 2021-05-27 RX ORDER — LOSARTAN POTASSIUM 50 MG/1
50 TABLET ORAL DAILY
Qty: 90 TABLET | Refills: 3 | Status: SHIPPED | OUTPATIENT
Start: 2021-05-27 | End: 2022-03-14 | Stop reason: SDUPTHER

## 2021-08-23 ENCOUNTER — TELEPHONE (OUTPATIENT)
Dept: INTERNAL MEDICINE | Facility: CLINIC | Age: 81
End: 2021-08-23

## 2021-08-23 DIAGNOSIS — I10 ESSENTIAL HYPERTENSION: ICD-10-CM

## 2021-08-23 DIAGNOSIS — M10.9 GOUTY ARTHRITIS: ICD-10-CM

## 2021-08-23 DIAGNOSIS — Z00.00 VISIT FOR WELL MAN HEALTH CHECK: Primary | ICD-10-CM

## 2021-08-25 ENCOUNTER — LAB VISIT (OUTPATIENT)
Dept: LAB | Facility: HOSPITAL | Age: 81
End: 2021-08-25
Payer: MEDICARE

## 2021-08-25 DIAGNOSIS — I10 ESSENTIAL HYPERTENSION: ICD-10-CM

## 2021-08-25 DIAGNOSIS — M10.9 GOUTY ARTHRITIS: ICD-10-CM

## 2021-08-25 DIAGNOSIS — Z00.00 VISIT FOR WELL MAN HEALTH CHECK: ICD-10-CM

## 2021-08-25 LAB
ALBUMIN SERPL BCP-MCNC: 4.4 G/DL (ref 3.5–5.2)
ALP SERPL-CCNC: 66 U/L (ref 55–135)
ALT SERPL W/O P-5'-P-CCNC: 23 U/L (ref 10–44)
ANION GAP SERPL CALC-SCNC: 9 MMOL/L (ref 8–16)
AST SERPL-CCNC: 37 U/L (ref 10–40)
BASOPHILS # BLD AUTO: 0.05 K/UL (ref 0–0.2)
BASOPHILS NFR BLD: 0.9 % (ref 0–1.9)
BILIRUB SERPL-MCNC: 1.1 MG/DL (ref 0.1–1)
BUN SERPL-MCNC: 10 MG/DL (ref 8–23)
CALCIUM SERPL-MCNC: 9.5 MG/DL (ref 8.7–10.5)
CHLORIDE SERPL-SCNC: 100 MMOL/L (ref 95–110)
CHOLEST SERPL-MCNC: 142 MG/DL (ref 120–199)
CHOLEST/HDLC SERPL: 2.3 {RATIO} (ref 2–5)
CO2 SERPL-SCNC: 25 MMOL/L (ref 23–29)
CREAT SERPL-MCNC: 0.9 MG/DL (ref 0.5–1.4)
DIFFERENTIAL METHOD: ABNORMAL
EOSINOPHIL # BLD AUTO: 0.5 K/UL (ref 0–0.5)
EOSINOPHIL NFR BLD: 9.7 % (ref 0–8)
ERYTHROCYTE [DISTWIDTH] IN BLOOD BY AUTOMATED COUNT: 11.9 % (ref 11.5–14.5)
EST. GFR  (AFRICAN AMERICAN): >60 ML/MIN/1.73 M^2
EST. GFR  (NON AFRICAN AMERICAN): >60 ML/MIN/1.73 M^2
GLUCOSE SERPL-MCNC: 123 MG/DL (ref 70–110)
HCT VFR BLD AUTO: 38.7 % (ref 40–54)
HDLC SERPL-MCNC: 61 MG/DL (ref 40–75)
HDLC SERPL: 43 % (ref 20–50)
HGB BLD-MCNC: 13.5 G/DL (ref 14–18)
IMM GRANULOCYTES # BLD AUTO: 0.02 K/UL (ref 0–0.04)
IMM GRANULOCYTES NFR BLD AUTO: 0.4 % (ref 0–0.5)
LDLC SERPL CALC-MCNC: 63 MG/DL (ref 63–159)
LYMPHOCYTES # BLD AUTO: 1.6 K/UL (ref 1–4.8)
LYMPHOCYTES NFR BLD: 29.9 % (ref 18–48)
MCH RBC QN AUTO: 35.8 PG (ref 27–31)
MCHC RBC AUTO-ENTMCNC: 34.9 G/DL (ref 32–36)
MCV RBC AUTO: 103 FL (ref 82–98)
MONOCYTES # BLD AUTO: 0.7 K/UL (ref 0.3–1)
MONOCYTES NFR BLD: 12.5 % (ref 4–15)
NEUTROPHILS # BLD AUTO: 2.5 K/UL (ref 1.8–7.7)
NEUTROPHILS NFR BLD: 46.6 % (ref 38–73)
NONHDLC SERPL-MCNC: 81 MG/DL
NRBC BLD-RTO: 0 /100 WBC
PLATELET # BLD AUTO: 249 K/UL (ref 150–450)
PMV BLD AUTO: 9.7 FL (ref 9.2–12.9)
POTASSIUM SERPL-SCNC: 4.5 MMOL/L (ref 3.5–5.1)
PROT SERPL-MCNC: 7.6 G/DL (ref 6–8.4)
RBC # BLD AUTO: 3.77 M/UL (ref 4.6–6.2)
SODIUM SERPL-SCNC: 134 MMOL/L (ref 136–145)
TRIGL SERPL-MCNC: 90 MG/DL (ref 30–150)
URATE SERPL-MCNC: 6.1 MG/DL (ref 3.4–7)
WBC # BLD AUTO: 5.45 K/UL (ref 3.9–12.7)

## 2021-08-25 PROCEDURE — 84550 ASSAY OF BLOOD/URIC ACID: CPT | Performed by: INTERNAL MEDICINE

## 2021-08-25 PROCEDURE — 80053 COMPREHEN METABOLIC PANEL: CPT | Performed by: INTERNAL MEDICINE

## 2021-08-25 PROCEDURE — 36415 COLL VENOUS BLD VENIPUNCTURE: CPT | Performed by: INTERNAL MEDICINE

## 2021-08-25 PROCEDURE — 80061 LIPID PANEL: CPT | Performed by: INTERNAL MEDICINE

## 2021-08-25 PROCEDURE — 85025 COMPLETE CBC W/AUTO DIFF WBC: CPT | Performed by: INTERNAL MEDICINE

## 2021-09-08 ENCOUNTER — TELEPHONE (OUTPATIENT)
Dept: INTERNAL MEDICINE | Facility: CLINIC | Age: 81
End: 2021-09-08

## 2021-09-10 ENCOUNTER — TELEPHONE (OUTPATIENT)
Dept: INTERNAL MEDICINE | Facility: CLINIC | Age: 81
End: 2021-09-10

## 2021-09-17 ENCOUNTER — OFFICE VISIT (OUTPATIENT)
Dept: INTERNAL MEDICINE | Facility: CLINIC | Age: 81
End: 2021-09-17
Payer: MEDICARE

## 2021-09-17 VITALS
BODY MASS INDEX: 28.18 KG/M2 | HEART RATE: 68 BPM | WEIGHT: 201.25 LBS | HEIGHT: 71 IN | SYSTOLIC BLOOD PRESSURE: 120 MMHG | DIASTOLIC BLOOD PRESSURE: 60 MMHG

## 2021-09-17 DIAGNOSIS — I10 ESSENTIAL HYPERTENSION: Primary | Chronic | ICD-10-CM

## 2021-09-17 DIAGNOSIS — G62.1 NEUROPATHY, ALCOHOLIC: ICD-10-CM

## 2021-09-17 DIAGNOSIS — M10.9 GOUTY ARTHRITIS: ICD-10-CM

## 2021-09-17 PROCEDURE — 99213 OFFICE O/P EST LOW 20 MIN: CPT | Mod: HCNC,S$GLB,, | Performed by: INTERNAL MEDICINE

## 2021-09-17 PROCEDURE — 1159F MED LIST DOCD IN RCRD: CPT | Mod: HCNC,CPTII,S$GLB, | Performed by: INTERNAL MEDICINE

## 2021-09-17 PROCEDURE — 3074F SYST BP LT 130 MM HG: CPT | Mod: HCNC,CPTII,S$GLB, | Performed by: INTERNAL MEDICINE

## 2021-09-17 PROCEDURE — 1159F PR MEDICATION LIST DOCUMENTED IN MEDICAL RECORD: ICD-10-PCS | Mod: HCNC,CPTII,S$GLB, | Performed by: INTERNAL MEDICINE

## 2021-09-17 PROCEDURE — 1100F PR PT FALLS ASSESS DOC 2+ FALLS/FALL W/INJURY/YR: ICD-10-PCS | Mod: HCNC,CPTII,S$GLB, | Performed by: INTERNAL MEDICINE

## 2021-09-17 PROCEDURE — 1160F RVW MEDS BY RX/DR IN RCRD: CPT | Mod: HCNC,CPTII,S$GLB, | Performed by: INTERNAL MEDICINE

## 2021-09-17 PROCEDURE — 99213 PR OFFICE/OUTPT VISIT, EST, LEVL III, 20-29 MIN: ICD-10-PCS | Mod: HCNC,S$GLB,, | Performed by: INTERNAL MEDICINE

## 2021-09-17 PROCEDURE — 99499 RISK ADDL DX/OHS AUDIT: ICD-10-PCS | Mod: HCNC,S$GLB,, | Performed by: INTERNAL MEDICINE

## 2021-09-17 PROCEDURE — 1125F AMNT PAIN NOTED PAIN PRSNT: CPT | Mod: HCNC,CPTII,S$GLB, | Performed by: INTERNAL MEDICINE

## 2021-09-17 PROCEDURE — 3074F PR MOST RECENT SYSTOLIC BLOOD PRESSURE < 130 MM HG: ICD-10-PCS | Mod: HCNC,CPTII,S$GLB, | Performed by: INTERNAL MEDICINE

## 2021-09-17 PROCEDURE — 99999 PR PBB SHADOW E&M-EST. PATIENT-LVL III: CPT | Mod: PBBFAC,HCNC,, | Performed by: INTERNAL MEDICINE

## 2021-09-17 PROCEDURE — 1125F PR PAIN SEVERITY QUANTIFIED, PAIN PRESENT: ICD-10-PCS | Mod: HCNC,CPTII,S$GLB, | Performed by: INTERNAL MEDICINE

## 2021-09-17 PROCEDURE — 3078F PR MOST RECENT DIASTOLIC BLOOD PRESSURE < 80 MM HG: ICD-10-PCS | Mod: HCNC,CPTII,S$GLB, | Performed by: INTERNAL MEDICINE

## 2021-09-17 PROCEDURE — 99999 PR PBB SHADOW E&M-EST. PATIENT-LVL III: ICD-10-PCS | Mod: PBBFAC,HCNC,, | Performed by: INTERNAL MEDICINE

## 2021-09-17 PROCEDURE — 3288F FALL RISK ASSESSMENT DOCD: CPT | Mod: HCNC,CPTII,S$GLB, | Performed by: INTERNAL MEDICINE

## 2021-09-17 PROCEDURE — 1160F PR REVIEW ALL MEDS BY PRESCRIBER/CLIN PHARMACIST DOCUMENTED: ICD-10-PCS | Mod: HCNC,CPTII,S$GLB, | Performed by: INTERNAL MEDICINE

## 2021-09-17 PROCEDURE — 3288F PR FALLS RISK ASSESSMENT DOCUMENTED: ICD-10-PCS | Mod: HCNC,CPTII,S$GLB, | Performed by: INTERNAL MEDICINE

## 2021-09-17 PROCEDURE — 99499 UNLISTED E&M SERVICE: CPT | Mod: HCNC,S$GLB,, | Performed by: INTERNAL MEDICINE

## 2021-09-17 PROCEDURE — 3078F DIAST BP <80 MM HG: CPT | Mod: HCNC,CPTII,S$GLB, | Performed by: INTERNAL MEDICINE

## 2021-09-17 PROCEDURE — 1100F PTFALLS ASSESS-DOCD GE2>/YR: CPT | Mod: HCNC,CPTII,S$GLB, | Performed by: INTERNAL MEDICINE

## 2022-01-18 ENCOUNTER — TELEPHONE (OUTPATIENT)
Dept: INTERNAL MEDICINE | Facility: CLINIC | Age: 82
End: 2022-01-18
Payer: MEDICARE

## 2022-01-18 NOTE — TELEPHONE ENCOUNTER
Spoke with pt, he stated he believe he have a UTI and that it has gotten better. Pt refused appt and msg being sent to PCP. Pt stated he will call back if anything changes.

## 2022-02-23 DIAGNOSIS — I10 ESSENTIAL HYPERTENSION: ICD-10-CM

## 2022-02-23 NOTE — TELEPHONE ENCOUNTER
----- Message from Raeann Arevalo sent at 2/23/2022 11:26 AM CST -----  Regarding: refill  Contact: patient [phone]  Requesting an RX refill or new RX.  Is this a refill or new RX: refill  RX name and strength (metoprolol succinate (TOPROL-XL) 100 MG 24 hr tablet  Is this a 30 day or 90 day RX: 90  Pharmacy name and phone # (.  [facility] - [facility] [address]  [address] [address]  Phone: [phone] Fax: [phone]    The doctors have asked that we provide their patients with the following 2 reminders -- prescription refills can take up to 72 hours, and a friendly reminder that in the future you can use your [facility] account to request refills: yes

## 2022-02-23 NOTE — TELEPHONE ENCOUNTER
No new care gaps identified.  Powered by [employer]. Reference number: [id number].   2/23/2022 2:02:26 PM CST

## 2022-02-25 NOTE — TELEPHONE ENCOUNTER
Refill Routing Note   Medication(s) are not appropriate for processing by [facility] for the following reason(s):      - Medication is a new start (<3 months)    [facility] action(s):  Defer       Medication Therapy Plan: Has not been ordered by PCP since 2018; defer to you as a new start  --->Care Gap information included in message below if applicable.   Medication reconciliation completed: No   Automatic Epic Generated Protocol Data:        Requested Prescriptions   Pending Prescriptions Disp Refills    metoprolol succinate (TOPROL-XL) 100 MG 24 hr tablet 90 tablet 1     Sig: Take 1 tablet (100 mg total) by mouth once daily.       Cardiovascular:  Beta Blockers Passed - 2/23/2022  2:02 PM        Passed - Patient is at least 18 years old        Passed - Last BP in normal range within 360 days     BP Readings from Last 1 Encounters:   09/17/21 120/60               Passed - Last Heart Rate in normal range within 360 days     Pulse Readings from Last 1 Encounters:   09/17/21 68              Passed - Valid encounter within last 15 months     Recent Visits  Date Type Provider Dept   09/17/21 Office Visit Floyd Moon MD [facility]   06/02/20 Office Visit Floyd Moon MD [facility]   Showing recent visits within past 720 days and meeting all other requirements  Future Appointments  No visits were found meeting these conditions.  Showing future appointments within next 150 days and meeting all other requirements                      Appointments  past 12m or future 3m with PCP    Date Provider   Last Visit   9/17/2021 Floyd Moon MD   Next Visit   Visit date not found Floyd Moon MD   ED visits in past 90 days: 0        Note composed:5:20 PM 02/25/2022

## 2022-02-28 RX ORDER — METOPROLOL SUCCINATE 100 MG/1
100 TABLET, EXTENDED RELEASE ORAL DAILY
Qty: 90 TABLET | Refills: 3 | Status: SHIPPED | OUTPATIENT
Start: 2022-02-28 | End: 2022-10-24

## 2022-03-08 DIAGNOSIS — I10 HYPERTENSION: ICD-10-CM

## 2022-03-08 DIAGNOSIS — I10 ESSENTIAL HYPERTENSION: ICD-10-CM

## 2022-03-08 NOTE — TELEPHONE ENCOUNTER
No new care gaps identified.  Powered by [employer]. Reference number: [id number].   3/08/2022 1:44:27 PM CST

## 2022-03-14 DIAGNOSIS — I10 ESSENTIAL HYPERTENSION: ICD-10-CM

## 2022-03-14 DIAGNOSIS — I10 HYPERTENSION: ICD-10-CM

## 2022-03-14 NOTE — TELEPHONE ENCOUNTER
----- Message from Nu Maddox sent at 3/14/2022 11:34 AM CDT -----  Contact: Self/[phone]  Requesting an RX refill or new RX.  Is this a refill or new RX: New  RX name and strength :  amLODIPine (NORVASC) 5 MG tablet  Is this a 30 day or 90 day RX: 90  [facility] - [facility] [address]  [address] [address]  Phone: [phone] Fax: [phone]    The doctors have asked that we provide their patients with the following 2 reminders -- prescription refills can take up to 72 hours, and a friendly reminder that in the future you can use your [facility] account to request refills: yes          Requesting an RX refill or new RX.  Is this a refill or new RX: New  RX name and strength :  losartan (COZAAR) 50 MG tablet  Is this a 30 day or 90 day RX: 90  [facility] - [facility] [address]  [address] [id number]  Phone: [phone] Fax: [phone]        The doctors have asked that we provide their patients with the following 2 reminders -- prescription refills can take up to 72 hours, and a friendly reminder that in the future you can use your [facility] account to request refills: yes

## 2022-03-14 NOTE — TELEPHONE ENCOUNTER
No new care gaps identified.  Powered by [employer]. Reference number: [id number].   3/14/2022 2:58:16 PM CDT

## 2022-03-16 RX ORDER — AMLODIPINE BESYLATE 5 MG/1
5 TABLET ORAL DAILY
Qty: 90 TABLET | Refills: 3 | OUTPATIENT
Start: 2022-03-16

## 2022-03-16 RX ORDER — AMLODIPINE BESYLATE 5 MG/1
5 TABLET ORAL DAILY
Qty: 90 TABLET | Refills: 1 | Status: SHIPPED | OUTPATIENT
Start: 2022-03-16 | End: 2022-08-31

## 2022-03-16 RX ORDER — LOSARTAN POTASSIUM 50 MG/1
50 TABLET ORAL DAILY
Qty: 90 TABLET | Refills: 1 | Status: SHIPPED | OUTPATIENT
Start: 2022-03-16 | End: 2022-08-31

## 2022-03-16 RX ORDER — LOSARTAN POTASSIUM 50 MG/1
50 TABLET ORAL DAILY
Qty: 90 TABLET | Refills: 3 | OUTPATIENT
Start: 2022-03-16

## 2022-03-16 NOTE — TELEPHONE ENCOUNTER
Quick DC. Duplicate Request-  med pended in previous encounter, awaiting assessment    Refill Authorization Note   Ej Miller  is requesting a refill authorization.  Brief Assessment and Rationale for Refill:  Quick Discontinue  Medication Therapy Plan:       Medication Reconciliation Completed:  No      Comments:   Pended Medication(s)       Requested Prescriptions     Pending Prescriptions Disp Refills    amLODIPine (NORVASC) 5 MG tablet [Pharmacy Med Name: AMLODIPINE BESYLATE 5 MG Tablet] 90 tablet 3     Sig: TAKE 1 TABLET (5 MG TOTAL) BY MOUTH ONCE DAILY.    losartan (COZAAR) 50 MG tablet [Pharmacy Med Name: LOSARTAN POTASSIUM 50 MG Tablet] 90 tablet 3     Sig: TAKE 1 TABLET (50 MG TOTAL) BY MOUTH ONCE DAILY.        Duplicate Pended Encounter(s)/ Last Prescribed Details: (includes pharmacy & prescriber details)   Requested Today (3/16/2022):   amLODIPine (NORVASC) 5 MG tablet   Sig: Take 1 tablet (5 mg total) by mouth once daily.   Disp:  90 tablet    Refills:  1   Received from: Telephone    Open Encounter   Requested Today (3/16/2022):  losartan (COZAAR) 50 MG tablet  Sig: Take 1 tablet (50 mg total) by mouth once daily.  Disp:  90 tablet    Refills:  1  Received from: Telephone   Open Encounter           Note composed:1:26 PM 03/16/2022

## 2022-03-16 NOTE — TELEPHONE ENCOUNTER
Refill Authorization Note   Ej Miller  is requesting a refill authorization.  Brief Assessment and Rationale for Refill:  Approve     Medication Therapy Plan:       Medication Reconciliation Completed: No   Comments:   --->Care Gap information included below if applicable.       Requested Prescriptions   Pending Prescriptions Disp Refills    amLODIPine (NORVASC) 5 MG tablet 90 tablet 1     Sig: Take 1 tablet (5 mg total) by mouth once daily.       Cardiovascular:  Calcium Channel Blockers Passed - 3/16/2022  1:22 PM        Passed - Patient is at least 18 years old        Passed - Last BP in normal range within 360 days     BP Readings from Last 3 Encounters:   09/17/21 120/60   07/01/20 130/64   06/22/20 138/70               Passed - Valid encounter within last 15 months     Recent Visits  Date Type Provider Dept   09/17/21 Office Visit Floyd Moon MD [facility] Medicine   06/02/20 Office Visit Floyd Moon MD [facility]   Showing recent visits within past 720 days and meeting all other requirements  Future Appointments  No visits were found meeting these conditions.  Showing future appointments within next 150 days and meeting all other requirements                  losartan (COZAAR) 50 MG tablet 90 tablet 1     Sig: Take 1 tablet (50 mg total) by mouth once daily.       Cardiovascular:  Angiotensin Receptor Blockers Passed - 3/16/2022  1:22 PM        Passed - Patient is at least 18 years old        Passed - Last BP in normal range within 360 days     BP Readings from Last 3 Encounters:   09/17/21 120/60   07/01/20 130/64   06/22/20 138/70               Passed - Valid encounter within last 15 months     Recent Visits  Date Type Provider Dept   09/17/21 Office Visit Floyd Moon MD [facility]   06/02/20 Office Visit Floyd Moon MD [facility]   Showing recent visits within past 720 days and meeting all other requirements  Future Appointments  No visits were found  meeting these conditions.  Showing future appointments within next 150 days and meeting all other requirements                Passed - Cr is 1.39 or below and within 360 days     Lab Results   Component Value Date    CREATININE 0.9 08/25/2021    CREATININE 1.0 05/29/2020    CREATININE 0.9 06/07/2018              Passed - K is 5.2 or below and within 360 days     Potassium   Date Value Ref Range Status   08/25/2021 4.5 3.5 - 5.1 mmol/L Final   05/29/2020 4.1 3.5 - 5.1 mmol/L Final   06/07/2018 4.1 3.5 - 5.1 mmol/L Final              Passed - eGFR within 360 days     Lab Results   Component Value Date    EGFRNONAA >60.0 08/25/2021    EGFRNONAA >60.0 05/29/2020    EGFRNONAA >60 06/07/2018                    Appointments  past 12m or future 3m with PCP    Date Provider   Last Visit   9/17/2021 Floyd Moon MD   Next Visit   Visit date not found Floyd Moon MD   ED visits in past 90 days: 0     Note composed:1:24 PM 03/16/2022

## 2022-07-04 ENCOUNTER — HOSPITAL ENCOUNTER (EMERGENCY)
Facility: HOSPITAL | Age: 82
Discharge: HOME | End: 2022-07-04
Attending: EMERGENCY MEDICINE
Payer: MEDICARE

## 2022-07-04 VITALS
OXYGEN SATURATION: 98 % | DIASTOLIC BLOOD PRESSURE: 85 MMHG | HEART RATE: 92 BPM | TEMPERATURE: 101 F | HEIGHT: 71 IN | SYSTOLIC BLOOD PRESSURE: 197 MMHG | RESPIRATION RATE: 18 BRPM | BODY MASS INDEX: 28.14 KG/M2 | WEIGHT: 201 LBS

## 2022-07-04 DIAGNOSIS — U07.1 COVID-19: ICD-10-CM

## 2022-07-04 DIAGNOSIS — R09.02 HYPOXIA: Primary | ICD-10-CM

## 2022-07-04 LAB
ALBUMIN SERPL BCP-MCNC: 4 G/DL (ref 3.5–5.2)
ALP SERPL-CCNC: 54 U/L (ref 55–135)
ALT SERPL W/O P-5'-P-CCNC: 28 U/L (ref 10–44)
ANION GAP SERPL CALC-SCNC: 13 MMOL/L (ref 8–16)
AST SERPL-CCNC: 55 U/L (ref 10–40)
BASOPHILS # BLD AUTO: 0.03 K/UL (ref 0–0.2)
BASOPHILS NFR BLD: 0.5 % (ref 0–1.9)
BILIRUB SERPL-MCNC: 0.8 MG/DL (ref 0.1–1)
BUN SERPL-MCNC: 15 MG/DL (ref 8–23)
CALCIUM SERPL-MCNC: 9.3 MG/DL (ref 8.7–10.5)
CHLORIDE SERPL-SCNC: 91 MMOL/L (ref 95–110)
CK SERPL-CCNC: 213 U/L (ref 20–200)
CO2 SERPL-SCNC: 23 MMOL/L (ref 23–29)
CREAT SERPL-MCNC: 1.2 MG/DL (ref 0.5–1.4)
CRP SERPL-MCNC: 80.7 MG/L (ref 0–8.2)
DIFFERENTIAL METHOD: ABNORMAL
EOSINOPHIL # BLD AUTO: 0 K/UL (ref 0–0.5)
EOSINOPHIL NFR BLD: 0.2 % (ref 0–8)
ERYTHROCYTE [DISTWIDTH] IN BLOOD BY AUTOMATED COUNT: 12 % (ref 11.5–14.5)
EST. GFR  (AFRICAN AMERICAN): >60 ML/MIN/1.73 M^2
EST. GFR  (NON AFRICAN AMERICAN): 55.2 ML/MIN/1.73 M^2
FERRITIN SERPL-MCNC: 1282 NG/ML (ref 20–300)
GLUCOSE SERPL-MCNC: 117 MG/DL (ref 70–110)
HCT VFR BLD AUTO: 37.3 % (ref 40–54)
HGB BLD-MCNC: 13.1 G/DL (ref 14–18)
IMM GRANULOCYTES # BLD AUTO: 0.04 K/UL (ref 0–0.04)
IMM GRANULOCYTES NFR BLD AUTO: 0.7 % (ref 0–0.5)
LACTATE SERPL-SCNC: 1.2 MMOL/L (ref 0.5–2.2)
LYMPHOCYTES # BLD AUTO: 1.1 K/UL (ref 1–4.8)
LYMPHOCYTES NFR BLD: 17.6 % (ref 18–48)
MCH RBC QN AUTO: 35.8 PG (ref 27–31)
MCHC RBC AUTO-ENTMCNC: 35.1 G/DL (ref 32–36)
MCV RBC AUTO: 102 FL (ref 82–98)
MONOCYTES # BLD AUTO: 0.9 K/UL (ref 0.3–1)
MONOCYTES NFR BLD: 14.3 % (ref 4–15)
NEUTROPHILS # BLD AUTO: 4.1 K/UL (ref 1.8–7.7)
NEUTROPHILS NFR BLD: 66.7 % (ref 38–73)
NRBC BLD-RTO: 0 /100 WBC
PLATELET # BLD AUTO: 158 K/UL (ref 150–450)
PMV BLD AUTO: 9.6 FL (ref 9.2–12.9)
POTASSIUM SERPL-SCNC: 3.9 MMOL/L (ref 3.5–5.1)
PROT SERPL-MCNC: 7.2 G/DL (ref 6–8.4)
RBC # BLD AUTO: 3.66 M/UL (ref 4.6–6.2)
SODIUM SERPL-SCNC: 127 MMOL/L (ref 136–145)
TROPONIN I SERPL DL<=0.01 NG/ML-MCNC: 0.02 NG/ML (ref 0–0.03)
WBC # BLD AUTO: 6.15 K/UL (ref 3.9–12.7)

## 2022-07-04 PROCEDURE — 85025 COMPLETE CBC W/AUTO DIFF WBC: CPT | Performed by: EMERGENCY MEDICINE

## 2022-07-04 PROCEDURE — 93010 EKG 12-LEAD: ICD-10-PCS | Mod: ,,, | Performed by: INTERNAL MEDICINE

## 2022-07-04 PROCEDURE — 82728 ASSAY OF FERRITIN: CPT | Performed by: EMERGENCY MEDICINE

## 2022-07-04 PROCEDURE — 99284 EMERGENCY DEPT VISIT MOD MDM: CPT | Mod: 25

## 2022-07-04 PROCEDURE — 99284 PR EMERGENCY DEPT VISIT,LEVEL IV: ICD-10-PCS | Mod: ,,, | Performed by: EMERGENCY MEDICINE

## 2022-07-04 PROCEDURE — 80053 COMPREHEN METABOLIC PANEL: CPT | Performed by: EMERGENCY MEDICINE

## 2022-07-04 PROCEDURE — 93005 ELECTROCARDIOGRAM TRACING: CPT

## 2022-07-04 PROCEDURE — 93010 ELECTROCARDIOGRAM REPORT: CPT | Mod: ,,, | Performed by: INTERNAL MEDICINE

## 2022-07-04 PROCEDURE — 25000003 PHARM REV CODE 250: Performed by: EMERGENCY MEDICINE

## 2022-07-04 PROCEDURE — 84484 ASSAY OF TROPONIN QUANT: CPT | Performed by: EMERGENCY MEDICINE

## 2022-07-04 PROCEDURE — 86140 C-REACTIVE PROTEIN: CPT | Performed by: EMERGENCY MEDICINE

## 2022-07-04 PROCEDURE — 99284 EMERGENCY DEPT VISIT MOD MDM: CPT | Mod: ,,, | Performed by: EMERGENCY MEDICINE

## 2022-07-04 PROCEDURE — 82550 ASSAY OF CK (CPK): CPT | Performed by: EMERGENCY MEDICINE

## 2022-07-04 PROCEDURE — 83605 ASSAY OF LACTIC ACID: CPT | Performed by: EMERGENCY MEDICINE

## 2022-07-04 RX ORDER — ACETAMINOPHEN 500 MG
1000 TABLET ORAL
Status: COMPLETED | OUTPATIENT
Start: 2022-07-04 | End: 2022-07-04

## 2022-07-04 RX ADMIN — ACETAMINOPHEN 1000 MG: 500 TABLET ORAL at 03:07

## 2022-07-04 NOTE — ED PROVIDER NOTES
Encounter Date: 7/4/2022       History     Chief Complaint   Patient presents with    COVID complaints     Coming from  via EMS for covid sx (coughing, malaise).      82-year-old male with multiple medical problems presents from urgent care.  He presented there today with acute viral syndrome.  Patient says he had a bad cold.  He does not know when that started.  Patient is a difficult historian with limited insight into their medical issues.  Reviewed urgent care records.  Patient tested positive for COVID.  Sent to the ED for further evaluation.  Currently being treated for hypertension gout.  Patient admits to fever and cough.  No nausea, vomiting, shortness of breath, chest pain, abdominal pain, or dysuria.  Associated sore throat.  He is vaccinated for COVID.  Wife and daughter arrived following initial evaluation and supplement HPI.  A ten point review of systems was completed and is negative except as documented above.  Patient denies any other acute medical complaint.  The patients available PMH, PSH, Social History, medications, allergies, and triage vital signs were reviewed just prior to their medical evaluation.        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 3/3/2017     Past Surgical History:   Procedure Laterality Date    APPENDECTOMY      TONSILLECTOMY, ADENOIDECTOMY      TOTAL KNEE ARTHROPLASTY Right 02/17/2017    TKR     Family History   Problem Relation Age of Onset    Stroke Father     No Known Problems Mother     Alcohol abuse Brother     Stroke Brother     Hypertension Son      Hypertension Son      Social History     Tobacco Use    Smoking status: Never Smoker    Smokeless tobacco: Never Used   Substance Use Topics    Alcohol use: Yes     Comment: 3-4 scotch daily    Drug use: No     Review of Systems   Constitutional: Positive for fever.   HENT: Positive for sore throat.    Eyes: Negative for visual disturbance.   Respiratory: Positive for cough. Negative for shortness of breath.    Cardiovascular: Negative for chest pain.   Gastrointestinal: Negative for abdominal pain, nausea and vomiting.   Genitourinary: Negative for dysuria.   Musculoskeletal: Negative for neck pain.   Skin: Negative for rash and wound.   Allergic/Immunologic: Negative for immunocompromised state.   Neurological: Negative for syncope.   Psychiatric/Behavioral: Negative for confusion.       Physical Exam     Initial Vitals [07/04/22 1423]   BP Pulse Resp Temp SpO2   (!) 144/100 84 18 (!) 100.5 °F (38.1 °C) 97 %      MAP       --         Physical Exam    Nursing note and vitals reviewed.  Constitutional: He appears well-developed and well-nourished. He is not diaphoretic. No distress.   HENT:   Head: Normocephalic and atraumatic.   Nose: Nose normal.   Eyes: Conjunctivae are normal. Right eye exhibits no discharge. Left eye exhibits no discharge.   Neck: Neck supple.   Normal range of motion.  Cardiovascular: Normal rate, regular rhythm and normal heart sounds. Exam reveals no gallop and no friction rub.    No murmur heard.  Pulmonary/Chest: Breath sounds normal. No respiratory distress. He has no wheezes. He has no rhonchi. He has no rales.   Abdominal: Abdomen is soft. He exhibits no distension. There is no abdominal tenderness. There is no rebound and no guarding.   Musculoskeletal:         General: No tenderness or edema. Normal range of motion.      Cervical back: Normal range of motion and neck supple.     Neurological: He is alert and oriented to person, place, and time. He has normal strength. GCS score is  15. GCS eye subscore is 4. GCS verbal subscore is 5. GCS motor subscore is 6.   Skin: Skin is warm and dry. No rash noted. No erythema.   Psychiatric: He has a normal mood and affect. His behavior is normal. Judgment and thought content normal.         ED Course   Procedures  Labs Reviewed   CBC W/ AUTO DIFFERENTIAL - Abnormal; Notable for the following components:       Result Value    RBC 3.66 (*)     Hemoglobin 13.1 (*)     Hematocrit 37.3 (*)      (*)     MCH 35.8 (*)     Immature Granulocytes 0.7 (*)     Lymph % 17.6 (*)     All other components within normal limits   COMPREHENSIVE METABOLIC PANEL - Abnormal; Notable for the following components:    Sodium 127 (*)     Chloride 91 (*)     Glucose 117 (*)     Alkaline Phosphatase 54 (*)     AST 55 (*)     eGFR if non  55.2 (*)     All other components within normal limits   C-REACTIVE PROTEIN - Abnormal; Notable for the following components:    CRP 80.7 (*)     All other components within normal limits   CK - Abnormal; Notable for the following components:     (*)     All other components within normal limits   FERRITIN - Abnormal; Notable for the following components:    Ferritin 1,282 (*)     All other components within normal limits   LACTIC ACID, PLASMA   TROPONIN I     EKG Readings: (Independently Interpreted)   Initial Reading: No STEMI. Rhythm: Normal Sinus Rhythm. Heart Rate: 87. Ectopy: PACs. Conduction: Normal.   poor qrs progression in V3-4       Imaging Results          X-Ray Chest AP Portable (Final result)  Result time 07/04/22 15:28:58    Final result by Trevor Singh MD (07/04/22 15:28:58)                 Impression:      No radiographic evidence of pneumonia or other source of cough/shortness of breath on this single view, noting that early/mild viral pneumonia may be radiographically occult.      Electronically signed by: Trevor Singh MD  Date:    07/04/2022  Time:    15:28             Narrative:     EXAMINATION:  XR CHEST AP PORTABLE    CLINICAL HISTORY:  COVID-19;    TECHNIQUE:  Single frontal view of the chest was performed.    COMPARISON:  Chest radiograph 12/30/2012    FINDINGS:  Monitoring leads overlie the chest.  Resolution is limited by body habitus with underpenetration.  Patient is slightly rotated.    Cardiomediastinal silhouette is midline and similar to prior without evidence of failure.  Similar nonspecific elevation of the right hemidiaphragm.  Pulmonary vasculature and hilar contours are within normal limits.  Bibasilar few scattered linear opacities consistent with minimal platelike scarring versus atelectasis.  The lungs are otherwise well expanded without large consolidation, pleural effusion or pneumothorax.  No acute osseous process seen.  PA and lateral views can be obtained.                              X-Rays:   Independently Interpreted Readings:   Other Readings:  No focal pna    Medications   acetaminophen tablet 1,000 mg (1,000 mg Oral Given 7/4/22 4228)     Medical Decision Making:   History:   I obtained history from: someone other than patient.       <> Summary of History: Wife and daughter assist HPI  Old Medical Records: I decided to obtain old medical records.  Old Records Summarized: records from clinic visits.       <> Summary of Records: UC papers  Independently Interpreted Test(s):   I have ordered and independently interpreted X-rays - see prior notes.  I have ordered and independently interpreted EKG Reading(s) - see prior notes  Clinical Tests:   Lab Tests: Ordered and Reviewed  Radiological Study: Ordered and Reviewed  ED Management:  82-year-old male presents with viral syndrome.  Positive for COVID at outside facility.  Vitals here with fever and hypertension.  Physical exam benign.  Labs c/w COVID.  Ambulatory saturation 80%.  Offered steroids and admission.  He declined and wants to leave AMA.  Has decision making capacity.  I discussed with wife.  Counseled on  returning when he gets worse.  Patient will return to ED for worsening symptoms, inability to eat/drink, fever greater than 100.4, or any other concerns.  Did bedside teaching with return precautions.  All questions answered.  The patient acknowledges understanding.  Gave verbal discharge instructions.  Referred for EUA.  No Paxlovid given patient is already hypoxic.                      Clinical Impression:   Final diagnoses:  [U07.1] COVID-19  [R09.02] Hypoxia (Primary)          ED Disposition Condition    AMA         Level of Complexity:  High, level 5.        Santi Ortega MD  07/04/22 1841       Santi Ortega MD  07/04/22 3426

## 2022-07-04 NOTE — ED NOTES
Patient identifiers for Ej Miller 82 y.o. male checked and correct.  Chief Complaint   Patient presents with    COVID complaints     Coming from  via EMS for covid sx (coughing, malaise).      Past Medical History:   Diagnosis Date    Cataract     Gastric ulcer; benign     Gout, joint     Hyperglycemia     Nuclear sclerosis of both eyes 9/21/2015    Osteoarthritis of knee     bilaterial    Peptic ulcer     Primary osteoarthritis of both knees 2/17/2017    Status post total right knee replacement 2/17/2017 3/3/2017     Allergies reported:   Review of patient's allergies indicates:   Allergen Reactions    Ketoconazole Rash     Topical cream years ago used         LOC: Patient is awake, alert, and aware of environment with an appropriate affect. Patient speaking appropriately, alert, oriented to person, situation.  APPEARANCE: Patient resting comfortably and in no acute distress. Patient is clean and well groomed, patient's clothing is properly fastened.  HEENT:   SKIN: The skin is warm and dry. Patient has normal skin turgor and moist mucus membranes.   MUSKULOSKELETAL: Patient is moving all extremities well, no obvious deformities noted. Pulses intact.   RESPIRATORY: Airway is open and patent. Respirations are spontaneous and non-labored with normal effort and rate.  CARDIAC: Patient has a normal rate and rhythm. No peripheral edema noted.   ABDOMEN: No distention noted. Soft and non-tender upon palpation.  NEUROLOGICAL:PERRL. Facial expression is symmetrical. Hand grasps are equal bilaterally. Normal sensation in all extremities when touched with finger.

## 2022-07-04 NOTE — DISCHARGE INSTRUCTIONS
Take Tylenol over the counter as directed on packaging as needed for pain or fever.     Our goal in the emergency department is to always give you outstanding care and exceptional service. You may receive a survey by mail or e-mail in the next week regarding your experience in our ED. We would greatly appreciate your completing and returning the survey. Your feedback provides us with a way to recognize our staff who give very good care and it helps us learn how to improve when your experience was below our aspiration of excellence.

## 2022-07-05 ENCOUNTER — TELEPHONE (OUTPATIENT)
Dept: INFECTIOUS DISEASES | Facility: HOSPITAL | Age: 82
End: 2022-07-05
Payer: MEDICARE

## 2022-08-18 ENCOUNTER — OFFICE VISIT (OUTPATIENT)
Dept: INTERNAL MEDICINE | Facility: CLINIC | Age: 82
End: 2022-08-18
Payer: MEDICARE

## 2022-08-18 VITALS
OXYGEN SATURATION: 98 % | HEART RATE: 51 BPM | WEIGHT: 192.88 LBS | HEIGHT: 71 IN | SYSTOLIC BLOOD PRESSURE: 122 MMHG | DIASTOLIC BLOOD PRESSURE: 80 MMHG | BODY MASS INDEX: 27 KG/M2

## 2022-08-18 DIAGNOSIS — Z99.89 DEPENDENCE ON OTHER ENABLING MACHINES AND DEVICES: ICD-10-CM

## 2022-08-18 DIAGNOSIS — Z00.00 ENCOUNTER FOR PREVENTIVE HEALTH EXAMINATION: Primary | ICD-10-CM

## 2022-08-18 DIAGNOSIS — Z86.16 PERSONAL HISTORY OF COVID-19: ICD-10-CM

## 2022-08-18 DIAGNOSIS — M54.50 ACUTE LEFT-SIDED LOW BACK PAIN, UNSPECIFIED WHETHER SCIATICA PRESENT: ICD-10-CM

## 2022-08-18 DIAGNOSIS — R26.9 ABNORMALITY OF GAIT AND MOBILITY: ICD-10-CM

## 2022-08-18 DIAGNOSIS — F10.10 ALCOHOL ABUSE: ICD-10-CM

## 2022-08-18 DIAGNOSIS — I10 HYPERTENSION, UNSPECIFIED TYPE: Chronic | ICD-10-CM

## 2022-08-18 DIAGNOSIS — M10.9 GOUTY ARTHRITIS: ICD-10-CM

## 2022-08-18 DIAGNOSIS — L30.9 ECZEMA, UNSPECIFIED TYPE: ICD-10-CM

## 2022-08-18 DIAGNOSIS — R94.4 DECREASED GFR: ICD-10-CM

## 2022-08-18 DIAGNOSIS — G62.1 NEUROPATHY, ALCOHOLIC: ICD-10-CM

## 2022-08-18 PROCEDURE — 3074F PR MOST RECENT SYSTOLIC BLOOD PRESSURE < 130 MM HG: ICD-10-PCS | Mod: CPTII,S$GLB,, | Performed by: NURSE PRACTITIONER

## 2022-08-18 PROCEDURE — 1125F AMNT PAIN NOTED PAIN PRSNT: CPT | Mod: CPTII,S$GLB,, | Performed by: NURSE PRACTITIONER

## 2022-08-18 PROCEDURE — 99499 UNLISTED E&M SERVICE: CPT | Mod: S$GLB,,, | Performed by: NURSE PRACTITIONER

## 2022-08-18 PROCEDURE — 1101F PR PT FALLS ASSESS DOC 0-1 FALLS W/OUT INJ PAST YR: ICD-10-PCS | Mod: CPTII,S$GLB,, | Performed by: NURSE PRACTITIONER

## 2022-08-18 PROCEDURE — 1170F PR FUNCTIONAL STATUS ASSESSED: ICD-10-PCS | Mod: CPTII,S$GLB,, | Performed by: NURSE PRACTITIONER

## 2022-08-18 PROCEDURE — 99999 PR PBB SHADOW E&M-EST. PATIENT-LVL III: CPT | Mod: PBBFAC,,, | Performed by: NURSE PRACTITIONER

## 2022-08-18 PROCEDURE — 3079F PR MOST RECENT DIASTOLIC BLOOD PRESSURE 80-89 MM HG: ICD-10-PCS | Mod: CPTII,S$GLB,, | Performed by: NURSE PRACTITIONER

## 2022-08-18 PROCEDURE — 99999 PR PBB SHADOW E&M-EST. PATIENT-LVL III: ICD-10-PCS | Mod: PBBFAC,,, | Performed by: NURSE PRACTITIONER

## 2022-08-18 PROCEDURE — 1170F FXNL STATUS ASSESSED: CPT | Mod: CPTII,S$GLB,, | Performed by: NURSE PRACTITIONER

## 2022-08-18 PROCEDURE — 3079F DIAST BP 80-89 MM HG: CPT | Mod: CPTII,S$GLB,, | Performed by: NURSE PRACTITIONER

## 2022-08-18 PROCEDURE — 3288F FALL RISK ASSESSMENT DOCD: CPT | Mod: CPTII,S$GLB,, | Performed by: NURSE PRACTITIONER

## 2022-08-18 PROCEDURE — 1160F RVW MEDS BY RX/DR IN RCRD: CPT | Mod: CPTII,S$GLB,, | Performed by: NURSE PRACTITIONER

## 2022-08-18 PROCEDURE — 99499 RISK ADDL DX/OHS AUDIT: ICD-10-PCS | Mod: S$GLB,,, | Performed by: NURSE PRACTITIONER

## 2022-08-18 PROCEDURE — 1159F MED LIST DOCD IN RCRD: CPT | Mod: CPTII,S$GLB,, | Performed by: NURSE PRACTITIONER

## 2022-08-18 PROCEDURE — 3288F PR FALLS RISK ASSESSMENT DOCUMENTED: ICD-10-PCS | Mod: CPTII,S$GLB,, | Performed by: NURSE PRACTITIONER

## 2022-08-18 PROCEDURE — G0439 PPPS, SUBSEQ VISIT: HCPCS | Mod: S$GLB,,, | Performed by: NURSE PRACTITIONER

## 2022-08-18 PROCEDURE — 1160F PR REVIEW ALL MEDS BY PRESCRIBER/CLIN PHARMACIST DOCUMENTED: ICD-10-PCS | Mod: CPTII,S$GLB,, | Performed by: NURSE PRACTITIONER

## 2022-08-18 PROCEDURE — 1125F PR PAIN SEVERITY QUANTIFIED, PAIN PRESENT: ICD-10-PCS | Mod: CPTII,S$GLB,, | Performed by: NURSE PRACTITIONER

## 2022-08-18 PROCEDURE — 3074F SYST BP LT 130 MM HG: CPT | Mod: CPTII,S$GLB,, | Performed by: NURSE PRACTITIONER

## 2022-08-18 PROCEDURE — 1159F PR MEDICATION LIST DOCUMENTED IN MEDICAL RECORD: ICD-10-PCS | Mod: CPTII,S$GLB,, | Performed by: NURSE PRACTITIONER

## 2022-08-18 PROCEDURE — G0439 PR MEDICARE ANNUAL WELLNESS SUBSEQUENT VISIT: ICD-10-PCS | Mod: S$GLB,,, | Performed by: NURSE PRACTITIONER

## 2022-08-18 PROCEDURE — 1101F PT FALLS ASSESS-DOCD LE1/YR: CPT | Mod: CPTII,S$GLB,, | Performed by: NURSE PRACTITIONER

## 2022-08-18 NOTE — PROGRESS NOTES
"  Ej Miller presented for a  Medicare AWV and comprehensive Health Risk Assessment today. The following components were reviewed and updated:    · Medical history  · Family History  · Social history  · Allergies and Current Medications  · Health Risk Assessment  · Health Maintenance  · Care Team         ** See Completed Assessments for Annual Wellness Visit within the encounter summary.**         The following assessments were completed:  · Living Situation  · CAGE  · Depression Screening  · Timed Get Up and Go  · Whisper Test  · Cognitive Function Screening      ·   · Nutrition Screening  · ADL Screening  · PAQ Screening        Vitals:    08/18/22 0849 08/18/22 0902   BP:  122/80   BP Location:  Right arm   Patient Position:  Sitting   Pulse:  (!) 51   SpO2:  98%   Weight: 87.5 kg (192 lb 14.4 oz)    Height: 5' 11" (1.803 m)      Body mass index is 26.9 kg/m².  Physical Exam  Vitals and nursing note reviewed.   Constitutional:       Appearance: He is well-developed.   HENT:      Head: Normocephalic.   Cardiovascular:      Rate and Rhythm: Normal rate and regular rhythm.   Pulmonary:      Effort: Pulmonary effort is normal.      Breath sounds: Normal breath sounds.   Abdominal:      General: Bowel sounds are normal.      Palpations: Abdomen is soft.   Musculoskeletal:      Comments: In wheelchair - ambulates with cane   Neurological:      Mental Status: He is alert and oriented to person, place, and time.      Motor: No abnormal muscle tone.               Diagnoses and health risks identified today and associated recommendations/orders:    1. Encounter for preventive health examination  Here for Health Risk Assessment/Annual Wellness Visit.  Health maintenance reviewed and updated. Follow up in one year.  Declined Shingrix, Covid booster  Appointment scheduled for annual visit with PCP.    2. Hypertension, unspecified type  Chronic, stable on current medications. Followed by PCP.    3. Neuropathy, " alcoholic  Chronic, stable. Followed by PCP.    4. Alcohol abuse  Chronic, reports he consumes 4 light beers nightly. CAGE score 0. Counseled to decrease amount of alcohol intake. Followed by PCP.    5. Eczema, unspecified type  Chronic, stable with PRN topical medications. Followed by Dermatology, PCP.    6. Personal history of COVID-19  Covid positive 7/04/2022. Seen in ED, declined admission. Reports that cough has resolved and he had returned to baseline function.    7. Gouty arthritis  Chronic, stable. Reports he is not taking allopurinol. Followed by PCP.    8. Decreased GFR  New onset. Followed by PCP.    9. Acute left-sided low back pain, unspecified whether sciatica present  New onset. Reports he tripped and fell approximately 2 months ago and has experienced left sided back pain since that time. Did not seek care at time. Declined Urgent/Same day appointment today. F/U scheduled with PCP    10. Dependence on other enabling machines and devices  Chronic, fall x 1 with injury, ambulates with cane. Followed by PCP.     11. Abnormality of gait and mobility  Chronic, fall x 1 with injury, ambulates with cane. Followed by PCP.       Provided Ej with a 5-10 year written screening schedule and personal prevention plan. Recommendations were developed using the USPSTF age appropriate recommendations. Education, counseling, and referrals were provided as needed. After Visit Summary printed and given to patient which includes a list of additional screenings\tests needed.    Follow up in 2 months (on 10/24/2022).with PCP    Esthela Francisco NP

## 2022-08-18 NOTE — PATIENT INSTRUCTIONS
Counseling and Referral of Other Preventative  (Italic type indicates deductible and co-insurance are waived)    Patient Name: Ej Miller  Today's Date: 8/18/2022    Health Maintenance       Date Due Completion Date    COVID-19 Vaccine (3 - Booster for Moderna series) 08/01/2021 3/1/2021    Shingles Vaccine (2 of 3) 08/18/2023 (Originally 12/7/2015) 10/12/2015    Influenza Vaccine (1) 09/01/2022 10/19/2020    Lipid Panel 08/25/2026 8/25/2021    TETANUS VACCINE 07/31/2027 7/31/2017 (Done)    Override on 7/31/2017: Done ([facility])        No orders of the defined types were placed in this encounter.    The following information is provided to all patients.  This information is to help you find resources for any of the problems found today that may be affecting your health:                Living healthy guide: www.[facility].[place].gov      Understanding Diabetes: www.diabetes.org      Eating healthy: www.cdc.gov/healthyweight      CDC home safety checklist: www.cdc.gov/steadi/patient.html      Agency on Aging: www.goea.[place].[facility]      Alcoholics anonymous (AA): www.aa.org      Physical Activity: www.anthony.nih.gov/dt2wkid      Tobacco use: www.quitwithusla.org

## 2022-10-24 ENCOUNTER — OFFICE VISIT (OUTPATIENT)
Dept: INTERNAL MEDICINE | Facility: CLINIC | Age: 82
End: 2022-10-24
Payer: MEDICARE

## 2022-10-24 VITALS
WEIGHT: 191 LBS | BODY MASS INDEX: 27.35 KG/M2 | HEART RATE: 50 BPM | HEIGHT: 70 IN | DIASTOLIC BLOOD PRESSURE: 60 MMHG | SYSTOLIC BLOOD PRESSURE: 102 MMHG

## 2022-10-24 DIAGNOSIS — G62.1 NEUROPATHY, ALCOHOLIC: Chronic | ICD-10-CM

## 2022-10-24 DIAGNOSIS — I10 HYPERTENSION, UNSPECIFIED TYPE: Chronic | ICD-10-CM

## 2022-10-24 DIAGNOSIS — M10.9 GOUTY ARTHRITIS: Chronic | ICD-10-CM

## 2022-10-24 DIAGNOSIS — R42 DIZZINESS: ICD-10-CM

## 2022-10-24 DIAGNOSIS — I10 ESSENTIAL HYPERTENSION: Primary | ICD-10-CM

## 2022-10-24 PROBLEM — R94.4 DECREASED GFR: Chronic | Status: ACTIVE | Noted: 2022-08-18

## 2022-10-24 PROCEDURE — 3288F PR FALLS RISK ASSESSMENT DOCUMENTED: ICD-10-PCS | Mod: CPTII,S$GLB,, | Performed by: INTERNAL MEDICINE

## 2022-10-24 PROCEDURE — 1126F PR PAIN SEVERITY QUANTIFIED, NO PAIN PRESENT: ICD-10-PCS | Mod: CPTII,S$GLB,, | Performed by: INTERNAL MEDICINE

## 2022-10-24 PROCEDURE — 1160F RVW MEDS BY RX/DR IN RCRD: CPT | Mod: CPTII,S$GLB,, | Performed by: INTERNAL MEDICINE

## 2022-10-24 PROCEDURE — 99214 OFFICE O/P EST MOD 30 MIN: CPT | Mod: S$GLB,,, | Performed by: INTERNAL MEDICINE

## 2022-10-24 PROCEDURE — 1160F PR REVIEW ALL MEDS BY PRESCRIBER/CLIN PHARMACIST DOCUMENTED: ICD-10-PCS | Mod: CPTII,S$GLB,, | Performed by: INTERNAL MEDICINE

## 2022-10-24 PROCEDURE — 3078F PR MOST RECENT DIASTOLIC BLOOD PRESSURE < 80 MM HG: ICD-10-PCS | Mod: CPTII,S$GLB,, | Performed by: INTERNAL MEDICINE

## 2022-10-24 PROCEDURE — 99999 PR PBB SHADOW E&M-EST. PATIENT-LVL III: CPT | Mod: PBBFAC,,, | Performed by: INTERNAL MEDICINE

## 2022-10-24 PROCEDURE — 3288F FALL RISK ASSESSMENT DOCD: CPT | Mod: CPTII,S$GLB,, | Performed by: INTERNAL MEDICINE

## 2022-10-24 PROCEDURE — 3074F SYST BP LT 130 MM HG: CPT | Mod: CPTII,S$GLB,, | Performed by: INTERNAL MEDICINE

## 2022-10-24 PROCEDURE — 1126F AMNT PAIN NOTED NONE PRSNT: CPT | Mod: CPTII,S$GLB,, | Performed by: INTERNAL MEDICINE

## 2022-10-24 PROCEDURE — 99214 PR OFFICE/OUTPT VISIT, EST, LEVL IV, 30-39 MIN: ICD-10-PCS | Mod: S$GLB,,, | Performed by: INTERNAL MEDICINE

## 2022-10-24 PROCEDURE — 1101F PR PT FALLS ASSESS DOC 0-1 FALLS W/OUT INJ PAST YR: ICD-10-PCS | Mod: CPTII,S$GLB,, | Performed by: INTERNAL MEDICINE

## 2022-10-24 PROCEDURE — 1101F PT FALLS ASSESS-DOCD LE1/YR: CPT | Mod: CPTII,S$GLB,, | Performed by: INTERNAL MEDICINE

## 2022-10-24 PROCEDURE — 3078F DIAST BP <80 MM HG: CPT | Mod: CPTII,S$GLB,, | Performed by: INTERNAL MEDICINE

## 2022-10-24 PROCEDURE — 1159F PR MEDICATION LIST DOCUMENTED IN MEDICAL RECORD: ICD-10-PCS | Mod: CPTII,S$GLB,, | Performed by: INTERNAL MEDICINE

## 2022-10-24 PROCEDURE — 1159F MED LIST DOCD IN RCRD: CPT | Mod: CPTII,S$GLB,, | Performed by: INTERNAL MEDICINE

## 2022-10-24 PROCEDURE — 3074F PR MOST RECENT SYSTOLIC BLOOD PRESSURE < 130 MM HG: ICD-10-PCS | Mod: CPTII,S$GLB,, | Performed by: INTERNAL MEDICINE

## 2022-10-24 PROCEDURE — 99999 PR PBB SHADOW E&M-EST. PATIENT-LVL III: ICD-10-PCS | Mod: PBBFAC,,, | Performed by: INTERNAL MEDICINE

## 2022-10-24 RX ORDER — METOPROLOL SUCCINATE 50 MG/1
50 TABLET, EXTENDED RELEASE ORAL DAILY
Qty: 90 TABLET | Refills: 3 | Status: SHIPPED | OUTPATIENT
Start: 2022-10-24 | End: 2023-06-20 | Stop reason: SDUPTHER

## 2022-10-24 NOTE — PROGRESS NOTES
Subjective:       Patient ID: Ej Miller is a 82 y.o. male.    Chief Complaint: Annual Exam and Hypertension    Hypertension  This is a chronic problem. The problem is unchanged. The problem is controlled. Pertinent negatives include no chest pain, palpitations or shortness of breath. The current treatment provides moderate improvement. Compliance problems include medication side effects.    Review of Systems   Constitutional:  Negative for fatigue.   HENT:  Negative for sore throat.    Eyes:  Negative for visual disturbance.   Respiratory:  Negative for shortness of breath.    Cardiovascular:  Negative for chest pain and palpitations.   Gastrointestinal:  Positive for abdominal pain (abd wall pain where he bumped against wheelchair).   Genitourinary:  Negative for dysuria, frequency and hematuria.   Musculoskeletal:  Negative for joint swelling.   Skin:  Negative for rash.   Neurological:  Negative for speech difficulty and weakness.   Psychiatric/Behavioral:  Negative for dysphoric mood.      Objective:      Physical Exam  Vitals reviewed.   Constitutional:       General: He is not in acute distress.     Appearance: He is well-developed.   HENT:      Head: Normocephalic and atraumatic.   Eyes:      Conjunctiva/sclera: Conjunctivae normal.      Pupils: Pupils are equal, round, and reactive to light.   Cardiovascular:      Rate and Rhythm: Normal rate and regular rhythm.      Heart sounds: Normal heart sounds.   Pulmonary:      Effort: Pulmonary effort is normal.      Breath sounds: Normal breath sounds. No wheezing.   Abdominal:      General: Bowel sounds are normal.      Palpations: Abdomen is soft.      Tenderness: There is no abdominal tenderness.       Musculoskeletal:         General: No tenderness. Normal range of motion.      Cervical back: Normal range of motion and neck supple.   Skin:     Findings: No erythema.   Neurological:      Mental Status: He is alert and oriented to person, place, and  time.      Cranial Nerves: No cranial nerve deficit.       Assessment:       1. Essential hypertension    2. Hypertension, unspecified type    3. Neuropathy, alcoholic    4. Gouty arthritis    5. Dizziness          Plan:       Ej was seen today for annual exam and hypertension.    Diagnoses and all orders for this visit:    Essential hypertension  Comments:  will reduce metoprolol and see if dizzineess improves  Orders:  -     metoprolol succinate (TOPROL-XL) 50 MG 24 hr tablet; Take 1 tablet (50 mg total) by mouth once daily.  -     Lipid Panel; Future    Hypertension, unspecified type  -     CBC Auto Differential; Future  -     Comprehensive Metabolic Panel; Future    Neuropathy, alcoholic  -     CBC Auto Differential; Future  -     Comprehensive Metabolic Panel; Future    Gouty arthritis  -     Uric Acid; Future    Dizziness  Comments:  had 2 minor falls he attributes to metoprolol      Follow up in about 9 months (around 7/24/2023) for F/U APPOINTMENT WITH ME, WITH LAB BEFORE.

## 2022-11-12 NOTE — PROGRESS NOTES
Subjective:       Patient ID: Ej Miller is a 76 y.o. male.    Chief Complaint: Annual Exam and Knee Pain (chronic)    Knee Pain    The incident occurred more than 1 week ago. There was no injury mechanism. The pain is present in the left knee and right knee. The quality of the pain is described as aching. The pain is moderate. The pain has been fluctuating since onset. Pertinent negatives include no inability to bear weight.   Hypertension   This is a chronic problem. The problem is unchanged. The problem is controlled. Pertinent negatives include no chest pain or shortness of breath. The current treatment provides significant improvement. There are no compliance problems.      Review of Systems   Respiratory: Negative for shortness of breath.    Cardiovascular: Negative for chest pain.       Objective:      Physical Exam   Constitutional: He is oriented to person, place, and time. He appears well-developed and well-nourished. No distress.   HENT:   Head: Normocephalic and atraumatic.   Mouth/Throat: Oropharynx is clear and moist.   Eyes: Conjunctivae are normal. Pupils are equal, round, and reactive to light.   Neck: Normal range of motion. Neck supple.   Cardiovascular: Normal rate, regular rhythm and normal heart sounds.    Pulmonary/Chest: Effort normal and breath sounds normal. He has no wheezes.   Abdominal: Soft. Bowel sounds are normal. There is no tenderness.   Musculoskeletal: Normal range of motion. He exhibits no edema or tenderness.   Neurological: He is alert and oriented to person, place, and time. No cranial nerve deficit.   Skin: No erythema.   Psychiatric: He has a normal mood and affect.   Vitals reviewed.      Assessment:       1. Essential hypertension    2. Chronic pain of both knees    3. Neuropathy, alcoholic        Plan:       Ej was seen today for annual exam and knee pain.    Diagnoses and all orders for this visit:    Essential hypertension    Chronic pain of both knees  -      Ambulatory consult to Orthopedics    Neuropathy, alcoholic  Comments:  discussed alcohol cessation, etc        Return in about 6 months (around 7/16/2017) for F/U APPOINTMENT WITH ME.         None

## 2023-03-20 ENCOUNTER — TELEPHONE (OUTPATIENT)
Dept: EMERGENCY MEDICINE | Facility: HOSPITAL | Age: 83
End: 2023-03-20

## 2023-03-20 ENCOUNTER — HOSPITAL ENCOUNTER (EMERGENCY)
Facility: HOSPITAL | Age: 83
Discharge: HOME | End: 2023-03-20
Attending: EMERGENCY MEDICINE
Payer: MEDICARE

## 2023-03-20 ENCOUNTER — TELEPHONE (OUTPATIENT)
Dept: EMERGENCY MEDICINE | Facility: HOSPITAL | Age: 83
End: 2023-03-20
Payer: MEDICARE

## 2023-03-20 VITALS
HEIGHT: 70 IN | SYSTOLIC BLOOD PRESSURE: 160 MMHG | RESPIRATION RATE: 20 BRPM | BODY MASS INDEX: 27.35 KG/M2 | WEIGHT: 191 LBS | TEMPERATURE: 99 F | DIASTOLIC BLOOD PRESSURE: 79 MMHG | HEART RATE: 82 BPM | OXYGEN SATURATION: 99 %

## 2023-03-20 DIAGNOSIS — R53.83 FATIGUE: ICD-10-CM

## 2023-03-20 DIAGNOSIS — R31.9 HEMATURIA, UNSPECIFIED TYPE: Primary | ICD-10-CM

## 2023-03-20 LAB
ALBUMIN SERPL BCP-MCNC: 4.3 G/DL (ref 3.5–5.2)
ALP SERPL-CCNC: 62 U/L (ref 55–135)
ALT SERPL W/O P-5'-P-CCNC: 21 U/L (ref 10–44)
ANION GAP SERPL CALC-SCNC: 11 MMOL/L (ref 8–16)
AST SERPL-CCNC: 42 U/L (ref 10–40)
BASOPHILS # BLD AUTO: 0.04 K/UL (ref 0–0.2)
BASOPHILS NFR BLD: 0.7 % (ref 0–1.9)
BILIRUB SERPL-MCNC: 1.5 MG/DL (ref 0.1–1)
BILIRUB UR QL STRIP: NEGATIVE
BUN SERPL-MCNC: 14 MG/DL (ref 8–23)
CALCIUM SERPL-MCNC: 9.8 MG/DL (ref 8.7–10.5)
CHLORIDE SERPL-SCNC: 98 MMOL/L (ref 95–110)
CLARITY UR REFRACT.AUTO: CLEAR
CO2 SERPL-SCNC: 25 MMOL/L (ref 23–29)
COLOR UR AUTO: YELLOW
CREAT SERPL-MCNC: 0.9 MG/DL (ref 0.5–1.4)
DIFFERENTIAL METHOD: ABNORMAL
EOSINOPHIL # BLD AUTO: 0.1 K/UL (ref 0–0.5)
EOSINOPHIL NFR BLD: 0.9 % (ref 0–8)
ERYTHROCYTE [DISTWIDTH] IN BLOOD BY AUTOMATED COUNT: 12.7 % (ref 11.5–14.5)
EST. GFR  (NO RACE VARIABLE): >60 ML/MIN/1.73 M^2
GLUCOSE SERPL-MCNC: 106 MG/DL (ref 70–110)
GLUCOSE UR QL STRIP: NEGATIVE
HCT VFR BLD AUTO: 38.4 % (ref 40–54)
HCV AB SERPL QL IA: NORMAL
HGB BLD-MCNC: 13.3 G/DL (ref 14–18)
HGB UR QL STRIP: ABNORMAL
HIV 1+2 AB+HIV1 P24 AG SERPL QL IA: NORMAL
IMM GRANULOCYTES # BLD AUTO: 0.01 K/UL (ref 0–0.04)
IMM GRANULOCYTES NFR BLD AUTO: 0.2 % (ref 0–0.5)
KETONES UR QL STRIP: ABNORMAL
LEUKOCYTE ESTERASE UR QL STRIP: NEGATIVE
LYMPHOCYTES # BLD AUTO: 1.1 K/UL (ref 1–4.8)
LYMPHOCYTES NFR BLD: 19.4 % (ref 18–48)
MCH RBC QN AUTO: 34.9 PG (ref 27–31)
MCHC RBC AUTO-ENTMCNC: 34.6 G/DL (ref 32–36)
MCV RBC AUTO: 101 FL (ref 82–98)
MICROSCOPIC COMMENT: ABNORMAL
MONOCYTES # BLD AUTO: 0.6 K/UL (ref 0.3–1)
MONOCYTES NFR BLD: 9.5 % (ref 4–15)
NEUTROPHILS # BLD AUTO: 4 K/UL (ref 1.8–7.7)
NEUTROPHILS NFR BLD: 69.3 % (ref 38–73)
NITRITE UR QL STRIP: NEGATIVE
NRBC BLD-RTO: 0 /100 WBC
PH UR STRIP: 6 [PH] (ref 5–8)
PLATELET # BLD AUTO: 166 K/UL (ref 150–450)
PMV BLD AUTO: 9.2 FL (ref 9.2–12.9)
POTASSIUM SERPL-SCNC: 4.3 MMOL/L (ref 3.5–5.1)
PROT SERPL-MCNC: 7.5 G/DL (ref 6–8.4)
PROT UR QL STRIP: NEGATIVE
RBC # BLD AUTO: 3.81 M/UL (ref 4.6–6.2)
RBC #/AREA URNS AUTO: 66 /HPF (ref 0–4)
SODIUM SERPL-SCNC: 134 MMOL/L (ref 136–145)
SP GR UR STRIP: 1.01 (ref 1–1.03)
TROPONIN I SERPL DL<=0.01 NG/ML-MCNC: 0.01 NG/ML (ref 0–0.03)
URN SPEC COLLECT METH UR: ABNORMAL
WBC # BLD AUTO: 5.77 K/UL (ref 3.9–12.7)
WBC #/AREA URNS AUTO: 2 /HPF (ref 0–5)

## 2023-03-20 PROCEDURE — 93010 EKG 12-LEAD: ICD-10-PCS | Mod: HCNC,,, | Performed by: INTERNAL MEDICINE

## 2023-03-20 PROCEDURE — 99284 EMERGENCY DEPT VISIT MOD MDM: CPT | Mod: ,,, | Performed by: EMERGENCY MEDICINE

## 2023-03-20 PROCEDURE — 96360 HYDRATION IV INFUSION INIT: CPT | Mod: HCNC

## 2023-03-20 PROCEDURE — 80053 COMPREHEN METABOLIC PANEL: CPT | Mod: HCNC | Performed by: STUDENT IN AN ORGANIZED HEALTH CARE EDUCATION/TRAINING PROGRAM

## 2023-03-20 PROCEDURE — 99284 PR EMERGENCY DEPT VISIT,LEVEL IV: ICD-10-PCS | Mod: ,,, | Performed by: EMERGENCY MEDICINE

## 2023-03-20 PROCEDURE — 81001 URINALYSIS AUTO W/SCOPE: CPT | Mod: HCNC | Performed by: STUDENT IN AN ORGANIZED HEALTH CARE EDUCATION/TRAINING PROGRAM

## 2023-03-20 PROCEDURE — 85025 COMPLETE CBC W/AUTO DIFF WBC: CPT | Mod: HCNC | Performed by: STUDENT IN AN ORGANIZED HEALTH CARE EDUCATION/TRAINING PROGRAM

## 2023-03-20 PROCEDURE — 99284 EMERGENCY DEPT VISIT MOD MDM: CPT | Mod: 25,HCNC

## 2023-03-20 PROCEDURE — 93010 ELECTROCARDIOGRAM REPORT: CPT | Mod: HCNC,,, | Performed by: INTERNAL MEDICINE

## 2023-03-20 PROCEDURE — 84484 ASSAY OF TROPONIN QUANT: CPT | Mod: HCNC | Performed by: STUDENT IN AN ORGANIZED HEALTH CARE EDUCATION/TRAINING PROGRAM

## 2023-03-20 PROCEDURE — 87389 HIV-1 AG W/HIV-1&-2 AB AG IA: CPT | Mod: HCNC | Performed by: PHYSICIAN ASSISTANT

## 2023-03-20 PROCEDURE — 86803 HEPATITIS C AB TEST: CPT | Mod: HCNC | Performed by: PHYSICIAN ASSISTANT

## 2023-03-20 PROCEDURE — 93005 ELECTROCARDIOGRAM TRACING: CPT | Mod: HCNC

## 2023-03-20 NOTE — ED NOTES
Assumed care of pt. Pt resting in semifowlers position at this time.  Denies pain or any needs.     Patient identifiers verified and correct for puneet rand  LOC: The patient is awake, alert and oriented x 3. Appropriate affect; answers questions appropriately  APPEARANCE: Patient appears comfortable and in no acute distress, patient is clean and well groomed.  SKIN: The skin is warm and dry, color consistent with ethnicity, patient has normal skin turgor and moist mucus membranes, skin intact,   MUSCULOSKELETAL: Patient moving all extremities spontaneously, no edema noted. Moves independently in bed  RESPIRATORY: Airway is open and patent, respirations are spontaneous, patient has a normal effort and rate, no accessory muscle use noted, pt remains on continuous pulse ox with O2 sats noted at 99% on room air  CARDIAC: pt remains on cardiac monitor. Denies chest pain. NSR noted on monitor  GASTRO: soft and non-tender to palpation. Denies n/v/d/abd pain. No bleeding noted at this time  : Pt denies any pain or frequency with urination. Voiding spontaneously  NEURO: Pt opens eyes spontaneously, behavior appropriate to situation, follows commands, facial expression symmetrical, bilateral hand grasp equal and even, purposeful motor response noted,clear speech noted.

## 2023-03-20 NOTE — ED NOTES
..Patient identifiers for Ej Miller 82 y.o. male checked and correct.  Chief Complaint   Patient presents with    Rectal Bleeding     Pt states he had an episode of dark red blood in his stool. Denies pain. Denies use of blood thinners.      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 3/3/2017     Allergies reported:   Review of patient's allergies indicates:   Allergen Reactions    Ketoconazole Rash     Topical cream years ago used         LOC: Patient is awake, alert, and aware of environment with an appropriate affect. Patient is oriented x 3 and speaking appropriately.  APPEARANCE: Patient resting comfortably and in no acute distress. Patient is clean and well groomed, patient's clothing is properly fastened.  HEENT: **AAO--c/o blood in urine at 2am today.   SKIN: The skin is warm and dry. Patient has normal skin turgor and moist mucus membranes. Skin is intact; no bruising or breakdown noted.  MUSKULOSKELETAL: Patient is moving all extremities well, no obvious deformities noted. Pulses intact.   RESPIRATORY: Airway is open and patent. Respirations are spontaneous and non-labored with normal effort and rate, BBS=clear  CARDIAC: Patient has a normal rate and rhythm.NSR  on cardiac monitor,No peripheral edema noted.   ABDOMEN: No distention noted. Bowel sounds active in all 4 quadrants. Soft and non-tender upon palpation.  NEUROLOGICAL:. Facial expression is symmetrical. Hand grasps are equal bilaterally. Normal sensation in all extremities when touched with finger.

## 2023-03-20 NOTE — ED PROVIDER NOTES
Encounter Date: 3/20/2023       History     Chief Complaint   Patient presents with    Rectal Bleeding     Pt states he had an episode of dark red blood in his stool. Denies pain. Denies use of blood thinners.      82-year-old male with history of hypertension, arthritis presenting with hematuria.  Had one episode earlier today, denies any pain with urination.  In addition reports the patient has been having fatigue since this morning, was unable to get up out of his chair due to fatigue this morning and subsequently voided on himself.  He was able to take a couple steps afterwards, though reports he is weaker than usual.  Uses a walker for assistance at baseline.  Denies any numbness.  Denies any abdominal pain, fevers or chills, cough, chest pain, shortness of breath, vomiting or diarrhea.      Review of patient's allergies indicates:   Allergen Reactions    Ketoconazole Rash     Topical cream years ago used     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 3/3/2017     Past Surgical History:   Procedure Laterality Date    APPENDECTOMY      TONSILLECTOMY, ADENOIDECTOMY      TOTAL KNEE ARTHROPLASTY Right 02/17/2017    TKR     Family History   Problem Relation Age of Onset    No Known Problems Mother     Stroke Father     Alcohol abuse Brother     Stroke Brother     Hypertension Son     Hypertension Son     No Known Problems Son      Social History     Tobacco Use    Smoking status: Never    Smokeless tobacco: Never   Substance Use Topics    Alcohol use: Yes     Alcohol/week: 28.0 standard drinks     Types: 28 Cans of beer per week     Comment: 4 beers nightly    Drug use: No     Review of Systems   Constitutional:  Negative for fever.    Respiratory:  Negative for shortness of breath.    Cardiovascular:  Negative for chest pain.   Gastrointestinal:  Negative for abdominal pain and vomiting.   Genitourinary:  Positive for hematuria. Negative for dysuria, penile discharge and penile pain.     Physical Exam     Initial Vitals [03/20/23 0743]   BP Pulse Resp Temp SpO2   (!) 174/118 69 18 97.6 °F (36.4 °C) 100 %      MAP       --         Physical Exam    Nursing note and vitals reviewed.  Constitutional: Vital signs are normal. He appears well-developed and well-nourished. He is not diaphoretic. He does not appear ill. No distress.   HENT:   Head: Normocephalic and atraumatic.   Eyes: Conjunctivae and EOM are normal. Right conjunctiva is not injected. Left conjunctiva is not injected.   Neck:   Normal range of motion.  Cardiovascular:  Normal rate, regular rhythm, S1 normal and S2 normal.     Exam reveals no gallop and no friction rub.       No murmur heard.  Pulmonary/Chest: No respiratory distress. He has no wheezes. He has no rhonchi. He has no rales. He exhibits no tenderness.   Abdominal: Abdomen is soft. He exhibits no distension and no mass. There is no abdominal tenderness. There is no rebound and no guarding.   Genitourinary:    Genitourinary Comments: Dried blood noted around penis, around ureteral meatus, no tenderness to palpation around penis, no lacerations noted     Musculoskeletal:         General: No edema.      Cervical back: Normal range of motion.     Neurological: He is alert and oriented to person, place, and time. He has normal strength. No cranial nerve deficit or sensory deficit.   Skin: Skin is warm and dry. No rash noted. No erythema. No pallor.       ED Course   Procedures  Labs Reviewed   CBC W/ AUTO DIFFERENTIAL - Abnormal; Notable for the following components:       Result Value    RBC 3.81 (*)     Hemoglobin 13.3 (*)     Hematocrit 38.4 (*)      (*)     MCH 34.9 (*)     All other components within normal limits    COMPREHENSIVE METABOLIC PANEL - Abnormal; Notable for the following components:    Sodium 134 (*)     Total Bilirubin 1.5 (*)     AST 42 (*)     All other components within normal limits   URINALYSIS, REFLEX TO URINE CULTURE - Abnormal; Notable for the following components:    Ketones, UA 1+ (*)     Occult Blood UA 2+ (*)     All other components within normal limits    Narrative:     Specimen Source->Urine   URINALYSIS MICROSCOPIC - Abnormal; Notable for the following components:    RBC, UA 66 (*)     All other components within normal limits    Narrative:     Specimen Source->Urine   HIV 1 / 2 ANTIBODY    Narrative:     Release to patient->Immediate   HEPATITIS C ANTIBODY    Narrative:     Release to patient->Immediate   TROPONIN I     EKG Readings: (Independently Interpreted)   Normal sinus rhythm, rate of 76. No ST elevations or depressions concerning for ischemia.  No STEMI per my independent interpretation       Imaging Results    None          Medications   sodium chloride 0.9% bolus 1,000 mL 1,000 mL (0 mLs Intravenous Stopped 3/20/23 0946)     Medical Decision Making:   History:   Old Medical Records: I decided to obtain old medical records.  Initial Assessment:   Emergent evaluation 82-year-old male presenting to the ED with hematuria.  Also complaining of generalized fatigue.  No focal findings on exam other than dried blood urethral meatus    Differential includes was not limited to electrolyte abnormalities, UTI, neoplasm, doubt renal calculi as patient has no pain    UA showed hematuria, no concern for UTI.  CBC without significant leukocytosis or anemia.  CMP showed no electrolyte abnormalities concerning hospitalization.  Troponin negative.  Patient informed of results, will plan outpatient follow-up with Urology.  Given ambulatory referral to Urology.      PCP follow-up within 2-3 days was recommended.  After taking into careful account the patient's history, physical exam findings, as well as  empirical and objective data obtained throughout ED workup, I feel no emergent medical condition has been identified. No further evaluation or admission was felt to be required, and the patient is stable for discharge from the ED. The patient and any additional family present were updated with test results, overall clinical impression, and recommended further plan of care, including discharge instructions as provided and outpatient follow-up for continued evaluation and management as needed. All questions were answered. The patient expressed understanding and agreed with current plan for discharge and follow-up plan of care. Strict ED return precautions were provided, including return/worsening of current symptoms or any other concerns.    Independently Interpreted Test(s):   I have ordered and independently interpreted EKG Reading(s) - see prior notes  Clinical Tests:   Lab Tests: Ordered and Reviewed  Medical Tests: Ordered and Reviewed          Attending Attestation:   Physician Attestation Statement for Resident:  As the supervising MD   Physician Attestation Statement: I have personally seen and examined this patient.   I agree with the above history.  -:   As the supervising MD I agree with the above PE.     As the supervising MD I agree with the above treatment, course, plan, and disposition.                               Clinical Impression:   Final diagnoses:  [R53.83] Fatigue  [R31.9] Hematuria, unspecified type (Primary)        ED Disposition Condition    Discharge Stable          ED Prescriptions    None       Follow-up Information       Follow up With Specialties Details Why Contact Info Additional Information    [facility]   [address] [facility] [id number] [phone] Main Building, 4th Floor Please park in [facility] and take [facility] elevator    [facility] - Emergency Dept Emergency Medicine  If symptoms worsen [address] [facility]  [place] [address]  [phone]              Pancho Roberts MD  Resident  03/20/23 1559       Sudhakar Ricardo MD  03/21/23 1053

## 2023-05-09 ENCOUNTER — PES CALL (OUTPATIENT)
Dept: ADMINISTRATIVE | Facility: CLINIC | Age: 83
End: 2023-05-09
Payer: MEDICARE

## 2023-06-12 ENCOUNTER — TELEPHONE (OUTPATIENT)
Dept: INTERNAL MEDICINE | Facility: CLINIC | Age: 83
End: 2023-06-12
Payer: MEDICARE

## 2023-06-12 NOTE — TELEPHONE ENCOUNTER
----- Message from Mariam Chang sent at 6/12/2023 10:02 AM CDT -----  Contact: Mobile [phone] Pts son Mr. Hair  Patients son Mr. Hair would like a call back in regards to him wanting to speak with you about the patients appointment that is scheduled on [place] [facility] second.

## 2023-06-20 ENCOUNTER — LAB VISIT (OUTPATIENT)
Dept: LAB | Facility: HOSPITAL | Age: 83
End: 2023-06-20
Payer: MEDICARE

## 2023-06-20 ENCOUNTER — OFFICE VISIT (OUTPATIENT)
Dept: INTERNAL MEDICINE | Facility: CLINIC | Age: 83
End: 2023-06-20
Payer: MEDICARE

## 2023-06-20 VITALS
OXYGEN SATURATION: 98 % | DIASTOLIC BLOOD PRESSURE: 85 MMHG | SYSTOLIC BLOOD PRESSURE: 138 MMHG | BODY MASS INDEX: 26.61 KG/M2 | HEART RATE: 73 BPM | HEIGHT: 71 IN | WEIGHT: 190.06 LBS

## 2023-06-20 DIAGNOSIS — I10 ESSENTIAL HYPERTENSION: ICD-10-CM

## 2023-06-20 DIAGNOSIS — D53.9 MACROCYTIC ANEMIA: ICD-10-CM

## 2023-06-20 DIAGNOSIS — M10.9 GOUTY ARTHRITIS: ICD-10-CM

## 2023-06-20 DIAGNOSIS — R31.9 HEMATURIA, UNSPECIFIED TYPE: ICD-10-CM

## 2023-06-20 DIAGNOSIS — G62.1 NEUROPATHY, ALCOHOLIC: ICD-10-CM

## 2023-06-20 DIAGNOSIS — Z00.00 ENCOUNTER FOR ANNUAL PHYSICAL EXAM: Primary | ICD-10-CM

## 2023-06-20 DIAGNOSIS — I10 HYPERTENSION, UNSPECIFIED TYPE: Chronic | ICD-10-CM

## 2023-06-20 LAB
ALBUMIN SERPL BCP-MCNC: 3.9 G/DL (ref 3.5–5.2)
ALP SERPL-CCNC: 67 U/L (ref 55–135)
ALT SERPL W/O P-5'-P-CCNC: 10 U/L (ref 10–44)
ANION GAP SERPL CALC-SCNC: 13 MMOL/L (ref 8–16)
AST SERPL-CCNC: 20 U/L (ref 10–40)
BASOPHILS # BLD AUTO: 0.03 K/UL (ref 0–0.2)
BASOPHILS NFR BLD: 0.6 % (ref 0–1.9)
BILIRUB SERPL-MCNC: 1.3 MG/DL (ref 0.1–1)
BUN SERPL-MCNC: 10 MG/DL (ref 8–23)
CALCIUM SERPL-MCNC: 9.4 MG/DL (ref 8.7–10.5)
CHLORIDE SERPL-SCNC: 95 MMOL/L (ref 95–110)
CHOLEST SERPL-MCNC: 128 MG/DL (ref 120–199)
CHOLEST/HDLC SERPL: 1.9 {RATIO} (ref 2–5)
CO2 SERPL-SCNC: 24 MMOL/L (ref 23–29)
CREAT SERPL-MCNC: 0.8 MG/DL (ref 0.5–1.4)
DIFFERENTIAL METHOD: ABNORMAL
EOSINOPHIL # BLD AUTO: 0.3 K/UL (ref 0–0.5)
EOSINOPHIL NFR BLD: 6.7 % (ref 0–8)
ERYTHROCYTE [DISTWIDTH] IN BLOOD BY AUTOMATED COUNT: 12.4 % (ref 11.5–14.5)
EST. GFR  (NO RACE VARIABLE): >60 ML/MIN/1.73 M^2
GLUCOSE SERPL-MCNC: 99 MG/DL (ref 70–110)
HCT VFR BLD AUTO: 35.8 % (ref 40–54)
HDLC SERPL-MCNC: 66 MG/DL (ref 40–75)
HDLC SERPL: 51.6 % (ref 20–50)
HGB BLD-MCNC: 12.4 G/DL (ref 14–18)
IMM GRANULOCYTES # BLD AUTO: 0.02 K/UL (ref 0–0.04)
IMM GRANULOCYTES NFR BLD AUTO: 0.4 % (ref 0–0.5)
LDLC SERPL CALC-MCNC: 51.6 MG/DL (ref 63–159)
LYMPHOCYTES # BLD AUTO: 1.2 K/UL (ref 1–4.8)
LYMPHOCYTES NFR BLD: 25.3 % (ref 18–48)
MCH RBC QN AUTO: 35.7 PG (ref 27–31)
MCHC RBC AUTO-ENTMCNC: 34.6 G/DL (ref 32–36)
MCV RBC AUTO: 103 FL (ref 82–98)
MONOCYTES # BLD AUTO: 0.7 K/UL (ref 0.3–1)
MONOCYTES NFR BLD: 14.1 % (ref 4–15)
NEUTROPHILS # BLD AUTO: 2.5 K/UL (ref 1.8–7.7)
NEUTROPHILS NFR BLD: 52.9 % (ref 38–73)
NONHDLC SERPL-MCNC: 62 MG/DL
NRBC BLD-RTO: 0 /100 WBC
PLATELET # BLD AUTO: 193 K/UL (ref 150–450)
PMV BLD AUTO: 9.7 FL (ref 9.2–12.9)
POTASSIUM SERPL-SCNC: 4.8 MMOL/L (ref 3.5–5.1)
PROT SERPL-MCNC: 6.9 G/DL (ref 6–8.4)
RBC # BLD AUTO: 3.47 M/UL (ref 4.6–6.2)
SODIUM SERPL-SCNC: 132 MMOL/L (ref 136–145)
TRIGL SERPL-MCNC: 52 MG/DL (ref 30–150)
TSH SERPL DL<=0.005 MIU/L-ACNC: 2.32 UIU/ML (ref 0.4–4)
URATE SERPL-MCNC: 5.9 MG/DL (ref 3.4–7)
VIT B12 SERPL-MCNC: 166 PG/ML (ref 210–950)
WBC # BLD AUTO: 4.75 K/UL (ref 3.9–12.7)

## 2023-06-20 PROCEDURE — 1126F AMNT PAIN NOTED NONE PRSNT: CPT | Mod: CPTII,S$GLB,, | Performed by: INTERNAL MEDICINE

## 2023-06-20 PROCEDURE — 3288F FALL RISK ASSESSMENT DOCD: CPT | Mod: CPTII,S$GLB,, | Performed by: INTERNAL MEDICINE

## 2023-06-20 PROCEDURE — 99397 PER PM REEVAL EST PAT 65+ YR: CPT | Mod: S$GLB,,, | Performed by: INTERNAL MEDICINE

## 2023-06-20 PROCEDURE — 80053 COMPREHEN METABOLIC PANEL: CPT | Performed by: INTERNAL MEDICINE

## 2023-06-20 PROCEDURE — 3079F DIAST BP 80-89 MM HG: CPT | Mod: CPTII,S$GLB,, | Performed by: INTERNAL MEDICINE

## 2023-06-20 PROCEDURE — 84443 ASSAY THYROID STIM HORMONE: CPT | Performed by: INTERNAL MEDICINE

## 2023-06-20 PROCEDURE — 1100F PR PT FALLS ASSESS DOC 2+ FALLS/FALL W/INJURY/YR: ICD-10-PCS | Mod: CPTII,S$GLB,, | Performed by: INTERNAL MEDICINE

## 2023-06-20 PROCEDURE — 82607 VITAMIN B-12: CPT | Performed by: INTERNAL MEDICINE

## 2023-06-20 PROCEDURE — 80061 LIPID PANEL: CPT | Performed by: INTERNAL MEDICINE

## 2023-06-20 PROCEDURE — 99999 PR PBB SHADOW E&M-EST. PATIENT-LVL III: ICD-10-PCS | Mod: PBBFAC,,, | Performed by: INTERNAL MEDICINE

## 2023-06-20 PROCEDURE — 99999 PR PBB SHADOW E&M-EST. PATIENT-LVL III: CPT | Mod: PBBFAC,,, | Performed by: INTERNAL MEDICINE

## 2023-06-20 PROCEDURE — 36415 COLL VENOUS BLD VENIPUNCTURE: CPT | Performed by: INTERNAL MEDICINE

## 2023-06-20 PROCEDURE — 84550 ASSAY OF BLOOD/URIC ACID: CPT | Performed by: INTERNAL MEDICINE

## 2023-06-20 PROCEDURE — 1100F PTFALLS ASSESS-DOCD GE2>/YR: CPT | Mod: CPTII,S$GLB,, | Performed by: INTERNAL MEDICINE

## 2023-06-20 PROCEDURE — 85025 COMPLETE CBC W/AUTO DIFF WBC: CPT | Performed by: INTERNAL MEDICINE

## 2023-06-20 PROCEDURE — 3288F PR FALLS RISK ASSESSMENT DOCUMENTED: ICD-10-PCS | Mod: CPTII,S$GLB,, | Performed by: INTERNAL MEDICINE

## 2023-06-20 PROCEDURE — 3075F SYST BP GE 130 - 139MM HG: CPT | Mod: CPTII,S$GLB,, | Performed by: INTERNAL MEDICINE

## 2023-06-20 PROCEDURE — 3079F PR MOST RECENT DIASTOLIC BLOOD PRESSURE 80-89 MM HG: ICD-10-PCS | Mod: CPTII,S$GLB,, | Performed by: INTERNAL MEDICINE

## 2023-06-20 PROCEDURE — 3075F PR MOST RECENT SYSTOLIC BLOOD PRESS GE 130-139MM HG: ICD-10-PCS | Mod: CPTII,S$GLB,, | Performed by: INTERNAL MEDICINE

## 2023-06-20 PROCEDURE — 1126F PR PAIN SEVERITY QUANTIFIED, NO PAIN PRESENT: ICD-10-PCS | Mod: CPTII,S$GLB,, | Performed by: INTERNAL MEDICINE

## 2023-06-20 PROCEDURE — 99397 PR PREVENTIVE VISIT,EST,65 & OVER: ICD-10-PCS | Mod: S$GLB,,, | Performed by: INTERNAL MEDICINE

## 2023-06-20 RX ORDER — METOPROLOL SUCCINATE 50 MG/1
50 TABLET, EXTENDED RELEASE ORAL DAILY
Qty: 90 TABLET | Refills: 3 | Status: SHIPPED | OUTPATIENT
Start: 2023-06-20

## 2023-06-20 RX ORDER — TOBRAMYCIN AND DEXAMETHASONE 3; 1 MG/ML; MG/ML
1 SUSPENSION/ DROPS OPHTHALMIC EVERY MORNING
COMMUNITY
Start: 2023-05-22

## 2023-06-20 NOTE — PROGRESS NOTES
Subjective:       Patient ID: Ej Miller is a 83 y.o. male.    Chief Complaint: No chief complaint on file.    HPI    Presents to establish care.     Had hematuria in march, went to ER, never follow up with urology. No further symptoms     PMHx    Hx of gout no longer on allopurinol     Macrocytic anemia on last labs.     HTN: well controlled on amlodipine, metoprolol and losartan     Lives with son. Drinks 2-3 double vodkas nightly.        Review of Systems   Constitutional:  Negative for chills, diaphoresis, fatigue and fever.   HENT:  Negative for rhinorrhea, sneezing and sore throat.    Respiratory:  Negative for cough, chest tightness, shortness of breath and wheezing.    Cardiovascular:  Negative for chest pain, palpitations and leg swelling.   Gastrointestinal:  Negative for abdominal pain, blood in stool, diarrhea, nausea and vomiting.   Musculoskeletal:  Negative for arthralgias and back pain.   Neurological:  Negative for dizziness, weakness, light-headedness and headaches.   Psychiatric/Behavioral:  Negative for agitation and behavioral problems.          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 3/3/2017     Past Surgical History:   Procedure Laterality Date    APPENDECTOMY      TONSILLECTOMY, ADENOIDECTOMY      TOTAL KNEE ARTHROPLASTY Right 02/17/2017    TKR      Patient Active Problem List   Diagnosis    HTN (hypertension)    Gouty arthritis    Alcohol abuse    Neuropathy, alcoholic    History of colon polyps    Primary open angle glaucoma of right eye, moderate stage    Open angle with borderline findings of left eye    Eczema    Personal history of COVID-19    Decreased GFR    Acute left-sided low back pain         Objective:      Physical Exam  Constitutional:       Appearance: Normal appearance.   HENT:      Head: Normocephalic and atraumatic.   Cardiovascular:      Rate and Rhythm: Normal rate and regular rhythm.      Heart sounds: Normal heart sounds.   Pulmonary:      Effort: Pulmonary effort is normal.      Breath sounds: Normal breath sounds. No stridor. No wheezing or rales.   Abdominal:      General: Abdomen is flat.      Palpations: Abdomen is soft. There is no mass.      Tenderness: There is no abdominal tenderness.   Skin:     General: Skin is warm and dry.   Neurological:      Mental Status: He is alert and oriented to person, place, and time.   Psychiatric:         Mood and Affect: Mood normal.       Assessment:       Problem List Items Addressed This Visit          Neuro    Neuropathy, alcoholic (Chronic)       Cardiac/Vascular    HTN (hypertension) (Chronic)    Relevant Medications    metoprolol succinate (TOPROL-XL) 50 MG 24 hr tablet       Orthopedic    Gouty arthritis (Chronic)    Relevant Orders    URIC ACID     Other Visit Diagnoses       Encounter for annual physical exam    -  Primary    Essential hypertension        will reduce metoprolol and see if dizzineess improves    Relevant Medications    metoprolol succinate (TOPROL-XL) 50 MG 24 hr tablet    Other Relevant Orders    Comprehensive Metabolic Panel    Lipid Panel    TSH    Macrocytic anemia        Relevant Orders    CBC Auto Differential (Completed)    VITAMIN B12    Hematuria, unspecified type        Relevant Orders    Urinalysis, Reflex to Urine Culture Urine, Clean Catch            Plan:         Diagnoses and all orders for this visit:    Encounter for annual physical exam  Annual wellness exam completed.     All medications, histories, and concerns reviewed, reconciled, and addressed.     Appropriate Screenings per pt's sex and age have been reviewed and discussed with pt.     Essential hypertension  Well controlled on current meds      Neuropathy, alcoholic    Gouty arthritis  -     URIC ACID; Future  Check uric acid today     Macrocytic anemia  -     CBC Auto Differential; Future  -     VITAMIN B12; Future  Check labs today     Hematuria, unspecified type  -     Urinalysis, Reflex to Urine Culture Urine, Clean Catch     If persistent hematuria will refer to urology             Brooklyn Tomlinson MD   Internal Medicine   Primary Care

## 2023-06-23 ENCOUNTER — TELEPHONE (OUTPATIENT)
Dept: INTERNAL MEDICINE | Facility: CLINIC | Age: 83
End: 2023-06-23
Payer: MEDICARE

## 2023-06-23 NOTE — TELEPHONE ENCOUNTER
----- Message from Brooklyn Tomlinson MD sent at 6/22/2023  1:07 PM CDT -----  Please let him know labs show anemia due to B12 deficiency. He should start taking b12 over the counter with dose of 1000 mcg daily.     Everything else looks good.     Thanks!

## 2023-06-30 ENCOUNTER — OFFICE VISIT (OUTPATIENT)
Dept: UROLOGY | Facility: CLINIC | Age: 83
End: 2023-06-30
Payer: MEDICARE

## 2023-06-30 VITALS
HEART RATE: 59 BPM | HEIGHT: 71 IN | BODY MASS INDEX: 27 KG/M2 | WEIGHT: 192.88 LBS | DIASTOLIC BLOOD PRESSURE: 62 MMHG | SYSTOLIC BLOOD PRESSURE: 147 MMHG

## 2023-06-30 DIAGNOSIS — R31.9 HEMATURIA, UNSPECIFIED TYPE: Primary | ICD-10-CM

## 2023-06-30 PROCEDURE — 1100F PTFALLS ASSESS-DOCD GE2>/YR: CPT | Mod: CPTII,S$GLB,, | Performed by: UROLOGY

## 2023-06-30 PROCEDURE — 1126F AMNT PAIN NOTED NONE PRSNT: CPT | Mod: CPTII,S$GLB,, | Performed by: UROLOGY

## 2023-06-30 PROCEDURE — 3077F PR MOST RECENT SYSTOLIC BLOOD PRESSURE >= 140 MM HG: ICD-10-PCS | Mod: CPTII,S$GLB,, | Performed by: UROLOGY

## 2023-06-30 PROCEDURE — 3288F PR FALLS RISK ASSESSMENT DOCUMENTED: ICD-10-PCS | Mod: CPTII,S$GLB,, | Performed by: UROLOGY

## 2023-06-30 PROCEDURE — 1159F MED LIST DOCD IN RCRD: CPT | Mod: CPTII,S$GLB,, | Performed by: UROLOGY

## 2023-06-30 PROCEDURE — 3078F PR MOST RECENT DIASTOLIC BLOOD PRESSURE < 80 MM HG: ICD-10-PCS | Mod: CPTII,S$GLB,, | Performed by: UROLOGY

## 2023-06-30 PROCEDURE — 1100F PR PT FALLS ASSESS DOC 2+ FALLS/FALL W/INJURY/YR: ICD-10-PCS | Mod: CPTII,S$GLB,, | Performed by: UROLOGY

## 2023-06-30 PROCEDURE — 1126F PR PAIN SEVERITY QUANTIFIED, NO PAIN PRESENT: ICD-10-PCS | Mod: CPTII,S$GLB,, | Performed by: UROLOGY

## 2023-06-30 PROCEDURE — 3078F DIAST BP <80 MM HG: CPT | Mod: CPTII,S$GLB,, | Performed by: UROLOGY

## 2023-06-30 PROCEDURE — 1159F PR MEDICATION LIST DOCUMENTED IN MEDICAL RECORD: ICD-10-PCS | Mod: CPTII,S$GLB,, | Performed by: UROLOGY

## 2023-06-30 PROCEDURE — 3077F SYST BP >= 140 MM HG: CPT | Mod: CPTII,S$GLB,, | Performed by: UROLOGY

## 2023-06-30 PROCEDURE — 99204 OFFICE O/P NEW MOD 45 MIN: CPT | Mod: S$GLB,,, | Performed by: UROLOGY

## 2023-06-30 PROCEDURE — 3288F FALL RISK ASSESSMENT DOCD: CPT | Mod: CPTII,S$GLB,, | Performed by: UROLOGY

## 2023-06-30 PROCEDURE — 99999 PR PBB SHADOW E&M-EST. PATIENT-LVL IV: CPT | Mod: PBBFAC,,, | Performed by: UROLOGY

## 2023-06-30 PROCEDURE — 99204 PR OFFICE/OUTPT VISIT, NEW, LEVL IV, 45-59 MIN: ICD-10-PCS | Mod: S$GLB,,, | Performed by: UROLOGY

## 2023-06-30 PROCEDURE — 99999 PR PBB SHADOW E&M-EST. PATIENT-LVL IV: ICD-10-PCS | Mod: PBBFAC,,, | Performed by: UROLOGY

## 2023-06-30 NOTE — PROGRESS NOTES
[facility]      Hematuria New Note    6/30/2023    Referred by:  Pancho Roberts MD    HPI: Ej Miller is a very pleasant 83 y.o. male who is a new patient to our department referred for evaluation of history of gross hematuria. He denies symptoms of irritative voiding including dysuria. He denies symptoms of obstructive voiding including decreased stream, hesitancy, intermittency, post void dribbling, and sense of incomplete emptying. Bladder scan PVR was 0 mL.  His history includes no notation of urolithiasis, hematuria, prior pelvic surgery, previous prolapse or incontinence procedures or neurological symptoms/diagnoses. He reports no urinary incontinence.     He had one day of gross hematuria, entirely asymptomatic that spontaneously resolved. No similar occurrences since.     He reports no additional urothelial cancer risk factors including irritative LUTS, prior pelvic radiation therapy, prior cyclophosphamide/ifosfamide chemotherapy, family history of urothelial cancer, Deluna Sydrome, occupational exposures to benzenes or aromatic amines, or chronic indwelling foreign body such as a catheter.       Prior Evaluations:  No previous upper urinary tract evaluation  No previous lower urinary tract evaluation    A review of 10+ systems was conducted with pertinent positive and negative findings documented in HPI with all other systems reviewed and negative.    Past medical, family, surgical and social history reviewed as documented in chart with pertinent positive medical, family, surgical and social history detailed in HPI.    Exam Findings:    Const: no acute distress, conversant and alert  Eyes: anicteric, extraocular muscles intact  ENMT: normocephalic, Nl oral membranes  Cardio: no cyanosis, nl cap refill  Pulm: no tachypnea; no resp distress  Musc: no laceration, no tenderness  Neuro: alert; oriented x 3  Skin: warm, dry; no petichiae  Psych: no anxiety; normal speech        Assessment/Plan:    Gross Hematuria:  (new, addt'l workup):  We discussed evaluation of his hematuria, including upper and lower urinary tract studies. This patient is High-Risk because the patient has a history of gross hematuria. Based on this risk level, I have recommended upper tract imaging with CT Urography given no contraindications along with cystoscopy in the office with local anesthesia.     Scheduled CT Urogram for evaluation of upper urinary tracts.  Scheduled office cystourethroscopy

## 2023-07-03 ENCOUNTER — PATIENT MESSAGE (OUTPATIENT)
Dept: UROLOGY | Facility: CLINIC | Age: 83
End: 2023-07-03
Payer: MEDICARE

## 2023-07-28 ENCOUNTER — HOSPITAL ENCOUNTER (OUTPATIENT)
Facility: HOSPITAL | Age: 83
Discharge: HOME | End: 2023-07-30
Attending: EMERGENCY MEDICINE | Admitting: STUDENT IN AN ORGANIZED HEALTH CARE EDUCATION/TRAINING PROGRAM
Payer: MEDICARE

## 2023-07-28 DIAGNOSIS — R07.9 CHEST PAIN: ICD-10-CM

## 2023-07-28 DIAGNOSIS — L03.116 CELLULITIS OF LEFT LOWER EXTREMITY: ICD-10-CM

## 2023-07-28 DIAGNOSIS — L03.90 CELLULITIS: ICD-10-CM

## 2023-07-28 DIAGNOSIS — R79.89 ELEVATED BRAIN NATRIURETIC PEPTIDE (BNP) LEVEL: ICD-10-CM

## 2023-07-28 DIAGNOSIS — B35.1 ONYCHOMYCOSIS: ICD-10-CM

## 2023-07-28 DIAGNOSIS — R60.9 EDEMA: Primary | ICD-10-CM

## 2023-07-28 PROBLEM — M25.474 BILATERAL SWELLING OF FEET AND ANKLES: Status: ACTIVE | Noted: 2023-07-28

## 2023-07-28 PROBLEM — M25.471 BILATERAL SWELLING OF FEET AND ANKLES: Status: ACTIVE | Noted: 2023-07-28

## 2023-07-28 PROBLEM — M25.475 BILATERAL SWELLING OF FEET AND ANKLES: Status: ACTIVE | Noted: 2023-07-28

## 2023-07-28 PROBLEM — M25.472 BILATERAL SWELLING OF FEET AND ANKLES: Status: ACTIVE | Noted: 2023-07-28

## 2023-07-28 PROBLEM — M79.89 LEG SWELLING: Status: ACTIVE | Noted: 2023-07-28

## 2023-07-28 LAB
ALBUMIN SERPL BCP-MCNC: 3.9 G/DL (ref 3.5–5.2)
ALP SERPL-CCNC: 71 U/L (ref 55–135)
ALT SERPL W/O P-5'-P-CCNC: 8 U/L (ref 10–44)
ANION GAP SERPL CALC-SCNC: 9 MMOL/L (ref 8–16)
AST SERPL-CCNC: 19 U/L (ref 10–40)
BASOPHILS # BLD AUTO: 0.03 K/UL (ref 0–0.2)
BASOPHILS NFR BLD: 0.5 % (ref 0–1.9)
BILIRUB SERPL-MCNC: 0.8 MG/DL (ref 0.1–1)
BNP SERPL-MCNC: 284 PG/ML (ref 0–99)
BUN SERPL-MCNC: 18 MG/DL (ref 8–23)
CALCIUM SERPL-MCNC: 9 MG/DL (ref 8.7–10.5)
CHLORIDE SERPL-SCNC: 98 MMOL/L (ref 95–110)
CO2 SERPL-SCNC: 25 MMOL/L (ref 23–29)
CREAT SERPL-MCNC: 0.9 MG/DL (ref 0.5–1.4)
CRP SERPL-MCNC: 7.3 MG/L (ref 0–8.2)
DIFFERENTIAL METHOD: ABNORMAL
EOSINOPHIL # BLD AUTO: 0.4 K/UL (ref 0–0.5)
EOSINOPHIL NFR BLD: 6.7 % (ref 0–8)
ERYTHROCYTE [DISTWIDTH] IN BLOOD BY AUTOMATED COUNT: 12.4 % (ref 11.5–14.5)
ERYTHROCYTE [SEDIMENTATION RATE] IN BLOOD BY PHOTOMETRIC METHOD: 12 MM/HR (ref 0–23)
EST. GFR  (NO RACE VARIABLE): >60 ML/MIN/1.73 M^2
GLUCOSE SERPL-MCNC: 94 MG/DL (ref 70–110)
HCT VFR BLD AUTO: 34.9 % (ref 40–54)
HGB BLD-MCNC: 12.7 G/DL (ref 14–18)
IMM GRANULOCYTES # BLD AUTO: 0.02 K/UL (ref 0–0.04)
IMM GRANULOCYTES NFR BLD AUTO: 0.3 % (ref 0–0.5)
LACTATE SERPL-SCNC: 0.7 MMOL/L (ref 0.5–2.2)
LYMPHOCYTES # BLD AUTO: 1 K/UL (ref 1–4.8)
LYMPHOCYTES NFR BLD: 15.8 % (ref 18–48)
MCH RBC QN AUTO: 36.8 PG (ref 27–31)
MCHC RBC AUTO-ENTMCNC: 36.4 G/DL (ref 32–36)
MCV RBC AUTO: 101 FL (ref 82–98)
MONOCYTES # BLD AUTO: 0.6 K/UL (ref 0.3–1)
MONOCYTES NFR BLD: 9 % (ref 4–15)
NEUTROPHILS # BLD AUTO: 4.4 K/UL (ref 1.8–7.7)
NEUTROPHILS NFR BLD: 67.7 % (ref 38–73)
NRBC BLD-RTO: 0 /100 WBC
PLATELET # BLD AUTO: 198 K/UL (ref 150–450)
PMV BLD AUTO: 9.6 FL (ref 9.2–12.9)
POCT GLUCOSE: 115 MG/DL (ref 70–110)
POTASSIUM SERPL-SCNC: 4.4 MMOL/L (ref 3.5–5.1)
PROT SERPL-MCNC: 6.9 G/DL (ref 6–8.4)
RBC # BLD AUTO: 3.45 M/UL (ref 4.6–6.2)
SODIUM SERPL-SCNC: 132 MMOL/L (ref 136–145)
TROPONIN I SERPL DL<=0.01 NG/ML-MCNC: 0.01 NG/ML (ref 0–0.03)
WBC # BLD AUTO: 6.45 K/UL (ref 3.9–12.7)

## 2023-07-28 PROCEDURE — 84484 ASSAY OF TROPONIN QUANT: CPT | Mod: HCNC | Performed by: EMERGENCY MEDICINE

## 2023-07-28 PROCEDURE — 83880 ASSAY OF NATRIURETIC PEPTIDE: CPT | Mod: HCNC | Performed by: EMERGENCY MEDICINE

## 2023-07-28 PROCEDURE — 83605 ASSAY OF LACTIC ACID: CPT | Mod: HCNC | Performed by: EMERGENCY MEDICINE

## 2023-07-28 PROCEDURE — 87040 BLOOD CULTURE FOR BACTERIA: CPT | Mod: 59,HCNC | Performed by: EMERGENCY MEDICINE

## 2023-07-28 PROCEDURE — 93005 ELECTROCARDIOGRAM TRACING: CPT | Mod: HCNC

## 2023-07-28 PROCEDURE — 86140 C-REACTIVE PROTEIN: CPT | Mod: HCNC | Performed by: EMERGENCY MEDICINE

## 2023-07-28 PROCEDURE — 93010 EKG 12-LEAD: ICD-10-PCS | Mod: HCNC,,, | Performed by: INTERNAL MEDICINE

## 2023-07-28 PROCEDURE — 96372 THER/PROPH/DIAG INJ SC/IM: CPT

## 2023-07-28 PROCEDURE — 25000003 PHARM REV CODE 250: Mod: HCNC | Performed by: EMERGENCY MEDICINE

## 2023-07-28 PROCEDURE — 63600175 PHARM REV CODE 636 W HCPCS: Mod: HCNC | Performed by: EMERGENCY MEDICINE

## 2023-07-28 PROCEDURE — 96366 THER/PROPH/DIAG IV INF ADDON: CPT

## 2023-07-28 PROCEDURE — 25000003 PHARM REV CODE 250: Mod: HCNC | Performed by: STUDENT IN AN ORGANIZED HEALTH CARE EDUCATION/TRAINING PROGRAM

## 2023-07-28 PROCEDURE — G0378 HOSPITAL OBSERVATION PER HR: HCPCS | Mod: HCNC

## 2023-07-28 PROCEDURE — 85025 COMPLETE CBC W/AUTO DIFF WBC: CPT | Mod: HCNC | Performed by: EMERGENCY MEDICINE

## 2023-07-28 PROCEDURE — 25000003 PHARM REV CODE 250: Mod: HCNC

## 2023-07-28 PROCEDURE — 93010 ELECTROCARDIOGRAM REPORT: CPT | Mod: HCNC,,, | Performed by: INTERNAL MEDICINE

## 2023-07-28 PROCEDURE — 80053 COMPREHEN METABOLIC PANEL: CPT | Mod: HCNC | Performed by: EMERGENCY MEDICINE

## 2023-07-28 PROCEDURE — 99223 1ST HOSP IP/OBS HIGH 75: CPT | Mod: HCNC,AI,,

## 2023-07-28 PROCEDURE — 63600175 PHARM REV CODE 636 W HCPCS: Mod: HCNC

## 2023-07-28 PROCEDURE — 85652 RBC SED RATE AUTOMATED: CPT | Mod: HCNC | Performed by: EMERGENCY MEDICINE

## 2023-07-28 PROCEDURE — 99223 PR INITIAL HOSPITAL CARE,LEVL III: ICD-10-PCS | Mod: HCNC,AI,,

## 2023-07-28 PROCEDURE — 96367 TX/PROPH/DG ADDL SEQ IV INF: CPT

## 2023-07-28 PROCEDURE — 63600175 PHARM REV CODE 636 W HCPCS: Mod: HCNC | Performed by: STUDENT IN AN ORGANIZED HEALTH CARE EDUCATION/TRAINING PROGRAM

## 2023-07-28 PROCEDURE — 96365 THER/PROPH/DIAG IV INF INIT: CPT | Mod: HCNC

## 2023-07-28 PROCEDURE — 99285 EMERGENCY DEPT VISIT HI MDM: CPT | Mod: 25,HCNC

## 2023-07-28 RX ORDER — ONDANSETRON 8 MG/1
8 TABLET, ORALLY DISINTEGRATING ORAL EVERY 8 HOURS PRN
Status: DISCONTINUED | OUTPATIENT
Start: 2023-07-28 | End: 2023-07-30 | Stop reason: HOSPADM

## 2023-07-28 RX ORDER — POLYETHYLENE GLYCOL 3350 17 G/17G
17 POWDER, FOR SOLUTION ORAL 2 TIMES DAILY PRN
Status: DISCONTINUED | OUTPATIENT
Start: 2023-07-28 | End: 2023-07-30 | Stop reason: HOSPADM

## 2023-07-28 RX ORDER — AMLODIPINE BESYLATE 5 MG/1
5 TABLET ORAL DAILY
Status: DISCONTINUED | OUTPATIENT
Start: 2023-07-29 | End: 2023-07-30 | Stop reason: HOSPADM

## 2023-07-28 RX ORDER — METOPROLOL SUCCINATE 50 MG/1
50 TABLET, EXTENDED RELEASE ORAL DAILY
Status: DISCONTINUED | OUTPATIENT
Start: 2023-07-29 | End: 2023-07-30 | Stop reason: HOSPADM

## 2023-07-28 RX ORDER — SIMETHICONE 80 MG
1 TABLET,CHEWABLE ORAL 4 TIMES DAILY PRN
Status: DISCONTINUED | OUTPATIENT
Start: 2023-07-28 | End: 2023-07-30 | Stop reason: HOSPADM

## 2023-07-28 RX ORDER — BISACODYL 10 MG
10 SUPPOSITORY, RECTAL RECTAL DAILY PRN
Status: DISCONTINUED | OUTPATIENT
Start: 2023-07-28 | End: 2023-07-30 | Stop reason: HOSPADM

## 2023-07-28 RX ORDER — LOSARTAN POTASSIUM 50 MG/1
50 TABLET ORAL DAILY
Status: DISCONTINUED | OUTPATIENT
Start: 2023-07-29 | End: 2023-07-30 | Stop reason: HOSPADM

## 2023-07-28 RX ORDER — SODIUM CHLORIDE 0.9 % (FLUSH) 0.9 %
10 SYRINGE (ML) INJECTION
Status: DISCONTINUED | OUTPATIENT
Start: 2023-07-28 | End: 2023-07-30 | Stop reason: HOSPADM

## 2023-07-28 RX ORDER — GLUCAGON 1 MG
1 KIT INJECTION
Status: DISCONTINUED | OUTPATIENT
Start: 2023-07-28 | End: 2023-07-30 | Stop reason: HOSPADM

## 2023-07-28 RX ORDER — SODIUM CHLORIDE 0.9 % (FLUSH) 0.9 %
5 SYRINGE (ML) INJECTION
Status: DISCONTINUED | OUTPATIENT
Start: 2023-07-28 | End: 2023-07-30 | Stop reason: HOSPADM

## 2023-07-28 RX ORDER — TALC
6 POWDER (GRAM) TOPICAL NIGHTLY PRN
Status: DISCONTINUED | OUTPATIENT
Start: 2023-07-28 | End: 2023-07-30 | Stop reason: HOSPADM

## 2023-07-28 RX ORDER — LATANOPROST 50 UG/ML
1 SOLUTION/ DROPS OPHTHALMIC NIGHTLY
Status: DISCONTINUED | OUTPATIENT
Start: 2023-07-28 | End: 2023-07-30 | Stop reason: HOSPADM

## 2023-07-28 RX ORDER — ENOXAPARIN SODIUM 100 MG/ML
40 INJECTION SUBCUTANEOUS EVERY 24 HOURS
Status: DISCONTINUED | OUTPATIENT
Start: 2023-07-28 | End: 2023-07-30 | Stop reason: HOSPADM

## 2023-07-28 RX ORDER — NAPROXEN SODIUM 220 MG/1
81 TABLET, FILM COATED ORAL DAILY
Status: DISCONTINUED | OUTPATIENT
Start: 2023-07-29 | End: 2023-07-30 | Stop reason: HOSPADM

## 2023-07-28 RX ORDER — PROCHLORPERAZINE EDISYLATE 5 MG/ML
5 INJECTION INTRAMUSCULAR; INTRAVENOUS EVERY 6 HOURS PRN
Status: DISCONTINUED | OUTPATIENT
Start: 2023-07-28 | End: 2023-07-30 | Stop reason: HOSPADM

## 2023-07-28 RX ORDER — MAG HYDROX/ALUMINUM HYD/SIMETH 200-200-20
30 SUSPENSION, ORAL (FINAL DOSE FORM) ORAL 4 TIMES DAILY PRN
Status: DISCONTINUED | OUTPATIENT
Start: 2023-07-28 | End: 2023-07-30 | Stop reason: HOSPADM

## 2023-07-28 RX ORDER — HYDROCODONE BITARTRATE AND ACETAMINOPHEN 10; 325 MG/1; MG/1
1 TABLET ORAL EVERY 6 HOURS PRN
Status: DISCONTINUED | OUTPATIENT
Start: 2023-07-28 | End: 2023-07-30 | Stop reason: HOSPADM

## 2023-07-28 RX ORDER — ACETAMINOPHEN 325 MG/1
650 TABLET ORAL EVERY 4 HOURS PRN
Status: DISCONTINUED | OUTPATIENT
Start: 2023-07-28 | End: 2023-07-30 | Stop reason: HOSPADM

## 2023-07-28 RX ORDER — IBUPROFEN 200 MG
24 TABLET ORAL
Status: DISCONTINUED | OUTPATIENT
Start: 2023-07-28 | End: 2023-07-30 | Stop reason: HOSPADM

## 2023-07-28 RX ORDER — NALOXONE HCL 0.4 MG/ML
0.02 VIAL (ML) INJECTION
Status: DISCONTINUED | OUTPATIENT
Start: 2023-07-28 | End: 2023-07-30 | Stop reason: HOSPADM

## 2023-07-28 RX ORDER — IPRATROPIUM BROMIDE AND ALBUTEROL SULFATE 2.5; .5 MG/3ML; MG/3ML
3 SOLUTION RESPIRATORY (INHALATION) EVERY 4 HOURS PRN
Status: DISCONTINUED | OUTPATIENT
Start: 2023-07-28 | End: 2023-07-30 | Stop reason: HOSPADM

## 2023-07-28 RX ORDER — IBUPROFEN 200 MG
16 TABLET ORAL
Status: DISCONTINUED | OUTPATIENT
Start: 2023-07-28 | End: 2023-07-30 | Stop reason: HOSPADM

## 2023-07-28 RX ORDER — ACETAMINOPHEN 500 MG
1000 TABLET ORAL EVERY 8 HOURS PRN
Status: DISCONTINUED | OUTPATIENT
Start: 2023-07-28 | End: 2023-07-30 | Stop reason: HOSPADM

## 2023-07-28 RX ADMIN — ENOXAPARIN SODIUM 40 MG: 40 INJECTION SUBCUTANEOUS at 05:07

## 2023-07-28 RX ADMIN — LATANOPROST 1 DROP: 50 SOLUTION OPHTHALMIC at 08:07

## 2023-07-28 RX ADMIN — SODIUM CHLORIDE 500 ML: 9 INJECTION, SOLUTION INTRAVENOUS at 01:07

## 2023-07-28 RX ADMIN — PIPERACILLIN SODIUM AND TAZOBACTAM SODIUM 4.5 G: 4; .5 INJECTION, POWDER, FOR SOLUTION INTRAVENOUS at 01:07

## 2023-07-28 RX ADMIN — VANCOMYCIN HYDROCHLORIDE 1750 MG: 10 INJECTION, POWDER, LYOPHILIZED, FOR SOLUTION INTRAVENOUS at 05:07

## 2023-07-28 NOTE — ASSESSMENT & PLAN NOTE
Leg swelling   83M with swelling of the bilateral lower extremities x5 days with some mild pain and erythema of the left foot. Signs of cellulitis of the left foot/ankle on exam with drainage.  -Afebrile and without leukocytosis. ESR/CRP and LA wnl. BNP elevated.   -XR left foot with signs of edema/cellulittis  -Given  Zosyn in the ED  -Start vancomycin  -Check LE US  -Check TTE   -Elevate legs

## 2023-07-28 NOTE — SUBJECTIVE & OBJECTIVE
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 3/3/2017       Past Surgical History:   Procedure Laterality Date    APPENDECTOMY      TONSILLECTOMY, ADENOIDECTOMY      TOTAL KNEE ARTHROPLASTY Right 02/17/2017    TKR       Review of patient's allergies indicates:   Allergen Reactions    Ketoconazole Rash     Topical cream years ago used       No current facility-administered medications on file prior to encounter.     Current Outpatient Medications on File Prior to Encounter   Medication Sig    amLODIPine (NORVASC) 5 MG tablet TAKE 1 TABLET EVERY DAY    aspirin 81 MG Chew Take 81 mg by mouth once daily.    betamethasone dipropionate (DIPROLENE) 0.05 % cream Use bid prn rash    BROMSITE 0.075 % Drop INSTILL 1 DROP INTO THE RIGHT EYE TWO TIMES A DAY    clobetasoL (TEMOVATE) 0.05 % cream Apply topically 2 (two) times daily.    dorzolamide-timolol 2-0.5% (COSOPT) 22.3-6.8 mg/mL ophthalmic solution     latanoprost 0.005 % ophthalmic solution     losartan (COZAAR) 50 MG tablet TAKE 1 TABLET EVERY DAY    metoprolol succinate (TOPROL-XL) 50 MG 24 hr tablet Take 1 tablet (50 mg total) by mouth once daily.    MULTIVIT-MIN/FA/LYCOPEN/LUTEIN (MEN 50 PLUS MULTIVITAMIN ORAL) Take by mouth.    tobramycin-dexAMETHasone 0.3-0.1% (TOBRADEX) 0.3-0.1 % DrpS Place 1 drop into the right eye every morning.     Family History       Problem Relation (Age of Onset)    Alcohol abuse Brother    Hypertension Son, Son    No Known Problems Mother, Son    Stroke Father, Brother          Tobacco Use    Smoking status: Never    Smokeless tobacco: Never   Substance and Sexual Activity    Alcohol use: Yes     Alcohol/week: 28.0 standard drinks     Types: 28 Cans of beer per week      Comment: 4 beers nightly    Drug use: No    Sexual activity: Yes     Partners: Female     Review of Systems   Constitutional:  Negative for diaphoresis and fever.   HENT:  Negative for trouble swallowing.    Eyes:  Negative for visual disturbance.   Respiratory:  Negative for cough, shortness of breath and wheezing.    Cardiovascular:  Positive for leg swelling. Negative for chest pain and palpitations.   Gastrointestinal:  Negative for abdominal pain, constipation, diarrhea, nausea and vomiting.   Genitourinary:  Negative for difficulty urinating, dysuria and frequency.   Musculoskeletal:  Positive for arthralgias. Negative for back pain.   Skin:  Positive for color change. Negative for wound.   Neurological:  Negative for weakness, light-headedness and headaches.   Psychiatric/Behavioral:  Negative for confusion. The patient is not nervous/anxious.    Objective:     Vital Signs (Most Recent):  Temp: 97.7 °F (36.5 °C) (07/28/23 1148)  Pulse: 87 (07/28/23 1148)  Resp: 20 (07/28/23 1148)  BP: (!) 162/66 (07/28/23 1148)  SpO2: 99 % (07/28/23 1148) Vital Signs (24h Range):  Temp:  [97.7 °F (36.5 °C)] 97.7 °F (36.5 °C)  Pulse:  [87] 87  Resp:  [20] 20  SpO2:  [99 %] 99 %  BP: (162)/(66) 162/66     Weight: 87.1 kg (192 lb)  Body mass index is 26.78 kg/m².     Physical Exam  Vitals and nursing note reviewed.   Constitutional:       General: He is not in acute distress.  HENT:      Head: Normocephalic and atraumatic.      Mouth/Throat:      Pharynx: No oropharyngeal exudate.   Eyes:      Extraocular Movements: Extraocular movements intact.      Comments: Ectropion right lower lid   Cardiovascular:      Rate and Rhythm: Normal rate and regular rhythm.      Heart sounds: Normal heart sounds.   Pulmonary:      Effort: Pulmonary effort is normal. No respiratory distress.      Breath sounds: Normal breath sounds.   Abdominal:      General: Bowel sounds are normal.      Palpations: Abdomen is soft.      Tenderness: There is no  abdominal tenderness.   Musculoskeletal:         General: Tenderness present. Normal range of motion.      Cervical back: Normal range of motion.      Right lower leg: Edema present.      Left lower leg: Edema present.      Comments: BLE swelling up to knees  TTP LLE around ankle with some pitting edema  Left foot erythematous and warm with some weeping discharge, tender.   Onychomycosis   Skin:     General: Skin is warm.      Findings: Erythema present.   Neurological:      Mental Status: He is alert and oriented to person, place, and time.      Motor: No weakness.   Psychiatric:         Mood and Affect: Mood normal.         Behavior: Behavior normal.         Thought Content: Thought content normal.                Significant Labs: All pertinent labs within the past 24 hours have been reviewed.  CBC:   Recent Labs   Lab 07/28/23  1211   WBC 6.45   HGB 12.7*   HCT 34.9*        CMP:   Recent Labs   Lab 07/28/23  1211   *   K 4.4   CL 98   CO2 25   GLU 94   BUN 18   CREATININE 0.9   CALCIUM 9.0   PROT 6.9   ALBUMIN 3.9   BILITOT 0.8   ALKPHOS 71   AST 19   ALT 8*   ANIONGAP 9     Cardiac Markers:   Recent Labs   Lab 07/28/23  1211   *     Lactic Acid:   Recent Labs   Lab 07/28/23  1211   LACTATE 0.7     Troponin:   Recent Labs   Lab 07/28/23  1211   TROPONINI 0.006       Significant Imaging: I have reviewed all pertinent imaging results/findings within the past 24 hours.  X-Ray Foot Complete Left  Narrative: EXAMINATION:  XR FOOT COMPLETE 3 VIEW LEFT    CLINICAL HISTORY:  .  Cellulitis, unspecified    TECHNIQUE:  AP, lateral and oblique views of the left foot were performed.    COMPARISON:  Left foot series 03/18/2013    FINDINGS:  Generalized osteopenia.  Overall alignment is within normal limits.  Partially imaged healed fracture deformity of the distal tibial shaft.  No acute displaced fracture, dislocation or destructive osseous process.  Age-related overall minimal to mild DJD, slightly  progressed since 2013.  Lisfranc articulation is congruent.  There is diffuse soft tissue prominence with subcutaneous stranding throughout the imaged distal left lower leg, ankle and dorsal foot, from edema or cellulitis.  There is question skin irregularity/focal soft tissue defect along the lateral aspect of the 5th toe at the level of the PIP joint.  Plantar calcaneal spur and enthesophyte at the Achilles insertion site noted.  No subcutaneous emphysema or radiodense retained foreign body.  Impression: Distal left lower leg, ankle and foot diffuse nonspecific soft tissue swelling from edema or cellulitis.  Additionally, there is questioned skin wound/ulceration at the 5th toe.  No acute or destructive osseous process seen.    Electronically signed by: Trevor Singh MD  Date:    07/28/2023  Time:    15:24  X-Ray Chest PA And Lateral  Narrative: EXAMINATION:  XR CHEST PA AND LATERAL    CLINICAL HISTORY:  Edema, unspecified    TECHNIQUE:  PA and lateral views of the chest were performed.    COMPARISON:  Chest radiograph 07/04/2022    FINDINGS:  Patient is slightly rotated.  No detrimental change.  Bibasilar minimal platelike scarring versus atelectasis.  Clothing button artifacts overlie the left paramedian aspect of the mediastinum.  Mediastinal structures are midline.  Grossly similar tortuosity of the aorta.  Heart is not enlarged.  Similar nonspecific elevation of the right hemidiaphragm.  Pulmonary vasculature and hilar contours are within normal limits.  No consolidation, pleural effusion or pneumothorax.  No acute osseous process seen.  PA and lateral views can be obtained.  Impression: No detrimental change or radiographic acute intrathoracic process seen on this single view.    Electronically signed by: Trevor Singh MD  Date:    07/28/2023  Time:    15:21

## 2023-07-28 NOTE — HPI
Ej Miller is a 83 y.o. male with PMHx HTN, gout, OA bilateral knees, alcohol use (3-4 Vodka drinks nightly) who presents to the ED 7/28 with bilateral leg swelling for the past five days. He has some redness of his left foot and tenderness over the left lower leg. States pain is mild, not like a gout flare. Elevating the legs helps. He is stable able to ambulate, uses walker at baseline. He states he is always cold and shivering. Denies fever, chest pain, shortness of breath, cough, nausea, vomiting, abdominal pain, diarrhea, urinary changes.     In the ED, slightly hypertensive with /66. Otherwise AFVSS. No leukocytosis, stable anemia with hbg 12.7. ESR/CRP and lactic acid WNL. , trop 0.006. CXR no acute process. Left foot XR with Distal left lower leg, ankle and foot diffuse nonspecific soft tissue swelling from edema or cellulitis.  Additionally, there is questioned skin wound/ulceration at the 5th toe . Given Zosyn. Admitted to  for further management.

## 2023-07-28 NOTE — NURSING
Pt arrived to floor,AAOX4, stable no distress, no pain at this time. BP stable Telem monitor on intact. Pt has BLE swelling. Pt has redness to left foot.Questions and concerns voiced and   answered.Call light in reach. Bed in low locked position. Side rails x2. Belongings at bedside.Pt care continues.

## 2023-07-28 NOTE — PLAN OF CARE
Problem: Adult Inpatient Plan of Care  Goal: Plan of Care Review  Outcome: Ongoing, Progressing  Goal: Patient-Specific Goal (Individualized)  Outcome: Ongoing, Progressing  Goal: Absence of Hospital-Acquired Illness or Injury  Outcome: Ongoing, Progressing  Goal: Optimal Comfort and Wellbeing  Outcome: Ongoing, Progressing  Goal: Readiness for Transition of Care  Outcome: Ongoing, Progressing     Problem: Infection  Goal: Absence of Infection Signs and Symptoms  Outcome: Ongoing, Progressing     Problem: Fall Injury Risk  Goal: Absence of Fall and Fall-Related Injury  Outcome: Ongoing, Progressing     Pt progressing towards goals. No distress notice. No falls or injuries during shift. Bed in lowest position, call light within reach, belonging at bedside. Safety precaution maintain.Plan of Care reviewed. Pt verbalized understanding.

## 2023-07-28 NOTE — H&P
[facility] - Emergency Dept  [facility] Medicine  History & Physical    Patient Name: Ej Miller  MRN: [id number]  Patient Class: OP- Observation  Admission Date: 7/28/2023  Attending Physician: Harshil Hunt MD   Primary Care Provider: Brooklyn Tomlinson MD         Patient information was obtained from patient, past medical records and ER records.     Subjective:     Principal Problem:Cellulitis of left lower extremity    Chief Complaint:   Chief Complaint   Patient presents with    Leg Swelling     Pain and swelling to both legs, r foot red, denies chills and no fever, no cp and no sob        HPI: Ej Miller is a 83 y.o. male with PMHx HTN, gout, OA bilateral knees, alcohol use (3-4 Vodka drinks nightly) who presents to the ED 7/28 with bilateral leg swelling for the past five days. He has some redness of his left foot and tenderness over the left lower leg. States pain is mild, not like a gout flare. Elevating the legs helps. He is stable able to ambulate, uses walker at baseline. He states he is always cold and shivering. Denies fever, chest pain, shortness of breath, cough, nausea, vomiting, abdominal pain, diarrhea, urinary changes.     In the ED, slightly hypertensive with /66. Otherwise AFVSS. No leukocytosis, stable anemia with hbg 12.7. ESR/CRP and lactic acid WNL. , trop 0.006. CXR no acute process. Left foot XR with Distal left lower leg, ankle and foot diffuse nonspecific soft tissue swelling from edema or cellulitis.  Additionally, there is questioned skin wound/ulceration at the 5th toe . Given Zosyn. Admitted to  for further management.       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     Current Outpatient Medications on File Prior to Encounter   Medication Sig    amLODIPine (NORVASC) 5 MG tablet TAKE 1 TABLET EVERY DAY    aspirin 81 MG Chew Take 81 mg by mouth once daily.    betamethasone dipropionate (DIPROLENE) 0.05 % cream Use bid prn rash    BROMSITE 0.075 % Drop INSTILL 1 DROP INTO THE RIGHT EYE TWO TIMES A DAY    clobetasoL (TEMOVATE) 0.05 % cream Apply topically 2 (two) times daily.    dorzolamide-timolol 2-0.5% (COSOPT) 22.3-6.8 mg/mL ophthalmic solution     latanoprost 0.005 % ophthalmic solution     losartan (COZAAR) 50 MG tablet TAKE 1 TABLET EVERY DAY    metoprolol succinate (TOPROL-XL) 50 MG 24 hr tablet Take 1 tablet (50 mg total) by mouth once daily.    MULTIVIT-MIN/FA/LYCOPEN/LUTEIN (MEN 50 PLUS MULTIVITAMIN ORAL) Take by mouth.    tobramycin-dexAMETHasone 0.3-0.1% (TOBRADEX) 0.3-0.1 % DrpS Place 1 drop into the right eye every morning.     Family History       Problem Relation (Age of Onset)    Alcohol abuse Brother    Hypertension Son, Son    No Known Problems Mother, Son    Stroke Father, Brother          Tobacco Use    Smoking status: Never    Smokeless tobacco: Never   Substance and Sexual Activity    Alcohol use: Yes     Alcohol/week: 28.0 standard drinks     Types: 28 Cans of beer per week     Comment: 4 beers nightly    Drug use: No    Sexual activity: Yes     Partners: Female     Review of Systems   Constitutional:  Negative for diaphoresis and fever.   HENT:  Negative for trouble swallowing.    Eyes:  Negative for visual disturbance.   Respiratory:  Negative for cough, shortness of  breath and wheezing.    Cardiovascular:  Positive for leg swelling. Negative for chest pain and palpitations.   Gastrointestinal:  Negative for abdominal pain, constipation, diarrhea, nausea and vomiting.   Genitourinary:  Negative for difficulty urinating, dysuria and frequency.   Musculoskeletal:  Positive for arthralgias. Negative for back pain.   Skin:  Positive for color change. Negative for wound.   Neurological:  Negative for weakness, light-headedness and headaches.   Psychiatric/Behavioral:  Negative for confusion. The patient is not nervous/anxious.    Objective:     Vital Signs (Most Recent):  Temp: 97.7 °F (36.5 °C) (07/28/23 1148)  Pulse: 87 (07/28/23 1148)  Resp: 20 (07/28/23 1148)  BP: (!) 162/66 (07/28/23 1148)  SpO2: 99 % (07/28/23 1148) Vital Signs (24h Range):  Temp:  [97.7 °F (36.5 °C)] 97.7 °F (36.5 °C)  Pulse:  [87] 87  Resp:  [20] 20  SpO2:  [99 %] 99 %  BP: (162)/(66) 162/66     Weight: 87.1 kg (192 lb)  Body mass index is 26.78 kg/m².     Physical Exam  Vitals and nursing note reviewed.   Constitutional:       General: He is not in acute distress.  HENT:      Head: Normocephalic and atraumatic.      Mouth/Throat:      Pharynx: No oropharyngeal exudate.   Eyes:      Extraocular Movements: Extraocular movements intact.      Comments: Ectropion right lower lid   Cardiovascular:      Rate and Rhythm: Normal rate and regular rhythm.      Heart sounds: Normal heart sounds.   Pulmonary:      Effort: Pulmonary effort is normal. No respiratory distress.      Breath sounds: Normal breath sounds.   Abdominal:      General: Bowel sounds are normal.      Palpations: Abdomen is soft.      Tenderness: There is no abdominal tenderness.   Musculoskeletal:         General: Tenderness present. Normal range of motion.      Cervical back: Normal range of motion.      Right lower leg: Edema present.      Left lower leg: Edema present.      Comments: BLE swelling up to knees  TTP LLE around ankle with some pitting  edema  Left foot erythematous and warm with some weeping discharge, tender.   Onychomycosis   Skin:     General: Skin is warm.      Findings: Erythema present.   Neurological:      Mental Status: He is alert and oriented to person, place, and time.      Motor: No weakness.   Psychiatric:         Mood and Affect: Mood normal.         Behavior: Behavior normal.         Thought Content: Thought content normal.                Significant Labs: All pertinent labs within the past 24 hours have been reviewed.  CBC:   Recent Labs   Lab 07/28/23  1211   WBC 6.45   HGB 12.7*   HCT 34.9*        CMP:   Recent Labs   Lab 07/28/23  1211   *   K 4.4   CL 98   CO2 25   GLU 94   BUN 18   CREATININE 0.9   CALCIUM 9.0   PROT 6.9   ALBUMIN 3.9   BILITOT 0.8   ALKPHOS 71   AST 19   ALT 8*   ANIONGAP 9     Cardiac Markers:   Recent Labs   Lab 07/28/23  1211   *     Lactic Acid:   Recent Labs   Lab 07/28/23  1211   LACTATE 0.7     Troponin:   Recent Labs   Lab 07/28/23  1211   TROPONINI 0.006       Significant Imaging: I have reviewed all pertinent imaging results/findings within the past 24 hours.  X-Ray Foot Complete Left  Narrative: EXAMINATION:  XR FOOT COMPLETE 3 VIEW LEFT    CLINICAL HISTORY:  .  Cellulitis, unspecified    TECHNIQUE:  AP, lateral and oblique views of the left foot were performed.    COMPARISON:  Left foot series 03/18/2013    FINDINGS:  Generalized osteopenia.  Overall alignment is within normal limits.  Partially imaged healed fracture deformity of the distal tibial shaft.  No acute displaced fracture, dislocation or destructive osseous process.  Age-related overall minimal to mild DJD, slightly progressed since 2013.  Lisfranc articulation is congruent.  There is diffuse soft tissue prominence with subcutaneous stranding throughout the imaged distal left lower leg, ankle and dorsal foot, from edema or cellulitis.  There is question skin irregularity/focal soft tissue defect along the lateral  aspect of the 5th toe at the level of the PIP joint.  Plantar calcaneal spur and enthesophyte at the Achilles insertion site noted.  No subcutaneous emphysema or radiodense retained foreign body.  Impression: Distal left lower leg, ankle and foot diffuse nonspecific soft tissue swelling from edema or cellulitis.  Additionally, there is questioned skin wound/ulceration at the 5th toe.  No acute or destructive osseous process seen.    Electronically signed by: Trevor Singh MD  Date:    07/28/2023  Time:    15:24  X-Ray Chest PA And Lateral  Narrative: EXAMINATION:  XR CHEST PA AND LATERAL    CLINICAL HISTORY:  Edema, unspecified    TECHNIQUE:  PA and lateral views of the chest were performed.    COMPARISON:  Chest radiograph 07/04/2022    FINDINGS:  Patient is slightly rotated.  No detrimental change.  Bibasilar minimal platelike scarring versus atelectasis.  Clothing button artifacts overlie the left paramedian aspect of the mediastinum.  Mediastinal structures are midline.  Grossly similar tortuosity of the aorta.  Heart is not enlarged.  Similar nonspecific elevation of the right hemidiaphragm.  Pulmonary vasculature and hilar contours are within normal limits.  No consolidation, pleural effusion or pneumothorax.  No acute osseous process seen.  PA and lateral views can be obtained.  Impression: No detrimental change or radiographic acute intrathoracic process seen on this single view.    Electronically signed by: Trevor Singh MD  Date:    07/28/2023  Time:    15:21       Assessment/Plan:     * Cellulitis of left lower extremity  Leg swelling   83M with swelling of the bilateral lower extremities x5 days with some mild pain and erythema of the left foot. Signs of cellulitis of the left foot/ankle on exam with drainage.  -Afebrile and without leukocytosis. ESR/CRP and LA wnl. BNP elevated.   -XR left foot with signs of edema/cellulittis  -Given  Zosyn in the ED  -Start vancomycin  -Check LE US  -Check TTE   -Elevate  legs    Onychomycosis  -Noted on exam. Rash from ketoconazole in the past.     Elevated brain natriuretic peptide (BNP) level  -Noted on admission. Denies HF history  -BLE edema on exam  -Check TTE    Primary open angle glaucoma of right eye, moderate stage  -Continue eye gtts    Alcohol abuse  -Drinks ~4 Vodka drinks nightly. No history of withdrawal   -CIWA Q shift    Gouty arthritis  -Chronic, stable  -No longer on allopurinol     HTN (hypertension)  -/66 on admission  -Continue home amlodipine, metoprolol, losartan       VTE Risk Mitigation (From admission, onward)         Ordered     enoxaparin injection 40 mg  Daily         07/28/23 1426     IP VTE HIGH RISK PATIENT  Once         07/28/23 1426     Place sequential compression device  Until discontinued         07/28/23 1426     Place sequential compression device  Until discontinued         07/28/23 1426                     On 07/28/2023, patient should be placed in hospital observation services under my care in collaboration with Harshil Hunt MD.      Valencia Garcia PA-C  Department of Hospital Medicine  [facility] - Emergency Dept

## 2023-07-28 NOTE — ED PROVIDER NOTES
Chief complaint:  Leg Swelling (Pain and swelling to both legs, r foot red, denies chills and no fever, no cp and no sob)      HPI:  Ej Miller is a 83 y.o. male presenting with acute onset of swelling to bilateral lower extremities that has been going on for the last 5 days or so.  He states that the swelling has gotten worse.  He states he had similar event before Le[place]se.  He denies any heart or lung problems.  He denies any chest pain, shortness of breath, or palpitations.  No fevers or chills.    ROS: As per HPI and below:  Constitutional:  No fevers, no chills  Cardiac: no chest pain  Respiratory: no shortness of breath  Abdominal: no abdominal pain, no nausea, no vomiting  Neuro: no focal numbness, no focal weakness        Review of patient's allergies indicates:   Allergen Reactions    Ketoconazole Rash     Topical cream years ago used       No current facility-administered medications on file prior to encounter.     Current Outpatient Medications on File Prior to Encounter   Medication Sig Dispense Refill    amLODIPine (NORVASC) 5 MG tablet TAKE 1 TABLET EVERY DAY 90 tablet 0    aspirin 81 MG Chew Take 81 mg by mouth once daily.      betamethasone dipropionate (DIPROLENE) 0.05 % cream Use bid prn rash 90 g 3    BROMSITE 0.075 % Drop INSTILL 1 DROP INTO THE RIGHT EYE TWO TIMES A DAY  0    clobetasoL (TEMOVATE) 0.05 % cream Apply topically 2 (two) times daily. 60 g 3    dorzolamide-timolol 2-0.5% (COSOPT) 22.3-6.8 mg/mL ophthalmic solution       latanoprost 0.005 % ophthalmic solution       losartan (COZAAR) 50 MG tablet TAKE 1 TABLET EVERY DAY 90 tablet 0    metoprolol succinate (TOPROL-XL) 50 MG 24 hr tablet Take 1 tablet (50 mg total) by mouth once daily. 90 tablet 3    MULTIVIT-MIN/FA/LYCOPEN/LUTEIN (MEN 50 PLUS MULTIVITAMIN ORAL) Take by mouth.      tobramycin-dexAMETHasone 0.3-0.1% (TOBRADEX) 0.3-0.1 % DrpS Place 1 drop into the right eye every morning.         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 3/3/2017     Past Surgical History:   Procedure Laterality Date    APPENDECTOMY      TONSILLECTOMY, ADENOIDECTOMY      TOTAL KNEE ARTHROPLASTY Right 02/17/2017    TKR       Social History     Socioeconomic History    Marital status:    Occupational History    Occupation: retired   Tobacco Use    Smoking status: Never    Smokeless tobacco: Never   Substance and Sexual Activity    Alcohol use: Yes     Alcohol/week: 28.0 standard drinks     Types: 28 Cans of beer per week     Comment: 4 beers nightly    Drug use: No    Sexual activity: Yes     Partners: Female     Social Determinants of Health     Financial Resource Strain: Low Risk     Difficulty of Paying Living Expenses: Not very hard   Food Insecurity: No Food Insecurity    Worried About Running Out of Food in the Last Year: Never true    Ran Out of Food in the Last Year: Never true   Transportation Needs: No Transportation Needs    Lack of Transportation (Medical): No    Lack of Transportation (Non-Medical): No   Physical Activity: Inactive    Days of Exercise per Week: 0 days    Minutes of Exercise per Session: 0 min   Stress: No Stress Concern Present    Feeling of Stress : Not at all   Social Connections: Moderately Isolated    Frequency of Communication with Friends and Family: More than three times a week    Frequency of Social Gatherings with Friends and Family: Once a week    Attends Zoroastrian Services: Never    Active Member of Clubs or Organizations: No    Attends Club or Organization Meetings: Never    Marital Status:    Housing Stability: Unknown    Unable to Pay for Housing in the Last Year: No    Unstable Housing in  the Last Year: No       Family History   Problem Relation Age of Onset    No Known Problems Mother     Stroke Father     Alcohol abuse Brother     Stroke Brother     Hypertension Son     Hypertension Son     No Known Problems Son        Physical Exam:    Vitals:    07/28/23 1148   BP: (!) 162/66   Pulse: 87   Resp: 20   Temp: 97.7 °F (36.5 °C)     Constitutional: Well-nourished, well-developed, in no acute distress, not cachectic  Eyes: PERRLA, EOMI, normal conjunctiva, normal sclera  ENT: Moist Mucous membranes  Respiratory: Clear to auscultation bilaterally, no wheezes, no crackles, no rhonchi  Cardiovascular: Regular rate and rhythm, no murmurs, no rubs, no gallops  Abdominal: Soft, nontender, nondistended, no guarding, no rebound  Musculoskeletal: Normal range of motion, no obvious deformity, normal capillary refill, head atraumatic, neck supple, no meningismus, 3+ lower extremity edema  Skin:  Left foot has significant erythema and oozing of liquid concerning for a cellulitis that is circumferential from the midfoot distally, there are numerous areas of possible small wounds between his toes, his right foot also has some mild erythema and edema but significantly less than his left.  Neurologic: Cranial nerves II through XII intact, no motor deficits, no sensory deficits, no cerebellar deficits  Psychological: Alert, oriented x3, normal affect, normal mood    Orders Placed This Encounter   Procedures    Blood culture #1 **CANNOT BE ORDERED STAT**    Blood culture #2 **CANNOT BE ORDERED STAT**    X-Ray Chest PA And Lateral    X-Ray Foot Complete Left    CBC auto differential    Comprehensive metabolic panel    Lactic acid, plasma    C-reactive protein    Sedimentation rate    Troponin I    Brain natriuretic peptide    EKG 12-lead    Insert Saline lock IV    Place in Observation       Medications   sodium chloride 0.9% bolus 500 mL 500 mL (500 mLs Intravenous New Bag 7/28/23 1305)   piperacillin-tazobactam (ZOSYN)  4.5 g in dextrose 5 % in water (D5W) 100 mL IVPB (MB+) (4.5 g Intravenous New Bag 7/28/23 1305)         Labs Reviewed   CBC W/ AUTO DIFFERENTIAL - Abnormal; Notable for the following components:       Result Value    RBC 3.45 (*)     Hemoglobin 12.7 (*)     Hematocrit 34.9 (*)      (*)     MCH 36.8 (*)     MCHC 36.4 (*)     Lymph % 15.8 (*)     All other components within normal limits   COMPREHENSIVE METABOLIC PANEL - Abnormal; Notable for the following components:    Sodium 132 (*)     ALT 8 (*)     All other components within normal limits   B-TYPE NATRIURETIC PEPTIDE - Abnormal; Notable for the following components:     (*)     All other components within normal limits   CULTURE, BLOOD   CULTURE, BLOOD   LACTIC ACID, PLASMA   C-REACTIVE PROTEIN   SEDIMENTATION RATE   TROPONIN I           Medical Decision Making  Differential diagnosis includes cellulitis, CHF, ACS, osteomyelitis     Patient presents with bilateral lower extremity edema has been present for the last 5 days or so.  He denies any cardiac complaints.  However on examination he has significant erythema to his left foot concerning for significant cellulitis and there is some erythema to his right foot as well.  Will perform cardiac and infectious workup and ultimately admit for IV antibiotics    Patient's laboratories are unremarkable.  His x-rays are also unremarkable.  His cardiac enzymes are negative as are his CRP and ESR.  However, given the degree of edema and infection to his left foot, I will admit for IV antibiotics    Amount and/or Complexity of Data Reviewed  Labs: ordered.  Radiology: ordered and independent interpretation performed.     Details: Chest x-ray:  No acute process  ECG/medicine tests: ordered and independent interpretation performed.     Details: EKG:  Sinus Negro at 50, Q-waves anteriorly, no ST elevation or depression, Q-waves are new in V2 and V3 but not in V1  Discussion of management or test interpretation  with external provider(s): Internal Medicine, they will admit    Risk  Prescription drug management.  Decision regarding hospitalization.      Procedures          ASSESSMENT  1. Edema    2. Cellulitis          Disposition:  Admit to internal medicine    New Prescriptions    No medications on file     Modified Medications    No medications on file     Discontinued Medications    No medications on file          Rodriguez Anand III, MD  07/28/23 1859

## 2023-07-29 LAB
ANION GAP SERPL CALC-SCNC: 8 MMOL/L (ref 8–16)
ASCENDING AORTA: 3.79 CM
AV INDEX (PROSTH): 0.83
AV MEAN GRADIENT: 4 MMHG
AV PEAK GRADIENT: 8 MMHG
AV REGURGITATION PRESSURE HALF TIME: 770 MS
AV VALVE AREA BY VELOCITY RATIO: 2.71 CM²
AV VALVE AREA: 2.79 CM²
AV VELOCITY RATIO: 0.81
BASOPHILS # BLD AUTO: 0.02 K/UL (ref 0–0.2)
BASOPHILS NFR BLD: 0.3 % (ref 0–1.9)
BSA FOR ECHO PROCEDURE: 2.09 M2
BUN SERPL-MCNC: 14 MG/DL (ref 8–23)
CALCIUM SERPL-MCNC: 8.8 MG/DL (ref 8.7–10.5)
CHLORIDE SERPL-SCNC: 102 MMOL/L (ref 95–110)
CO2 SERPL-SCNC: 24 MMOL/L (ref 23–29)
CREAT SERPL-MCNC: 0.8 MG/DL (ref 0.5–1.4)
CV ECHO LV RWT: 0.34 CM
DIFFERENTIAL METHOD: ABNORMAL
DOP CALC AO PEAK VEL: 1.39 M/S
DOP CALC AO VTI: 30.04 CM
DOP CALC LVOT AREA: 3.4 CM2
DOP CALC LVOT DIAMETER: 2.07 CM
DOP CALC LVOT PEAK VEL: 1.12 M/S
DOP CALC LVOT STROKE VOLUME: 83.89 CM3
DOP CALCLVOT PEAK VEL VTI: 24.94 CM
E WAVE DECELERATION TIME: 181.76 MSEC
E/A RATIO: 0.99
E/E' RATIO: 9.75 M/S
ECHO LV POSTERIOR WALL: 0.84 CM (ref 0.6–1.1)
EOSINOPHIL # BLD AUTO: 0.4 K/UL (ref 0–0.5)
EOSINOPHIL NFR BLD: 6.2 % (ref 0–8)
ERYTHROCYTE [DISTWIDTH] IN BLOOD BY AUTOMATED COUNT: 12.1 % (ref 11.5–14.5)
EST. GFR  (NO RACE VARIABLE): >60 ML/MIN/1.73 M^2
FRACTIONAL SHORTENING: 29 % (ref 28–44)
GLUCOSE SERPL-MCNC: 88 MG/DL (ref 70–110)
HCT VFR BLD AUTO: 34.4 % (ref 40–54)
HGB BLD-MCNC: 12.1 G/DL (ref 14–18)
IMM GRANULOCYTES # BLD AUTO: 0.02 K/UL (ref 0–0.04)
IMM GRANULOCYTES NFR BLD AUTO: 0.3 % (ref 0–0.5)
INTERVENTRICULAR SEPTUM: 1.04 CM (ref 0.6–1.1)
IVRT: 134.16 MSEC
LA MAJOR: 5.96 CM
LA MINOR: 6.45 CM
LA WIDTH: 4.76 CM
LEFT ATRIUM SIZE: 4.54 CM
LEFT ATRIUM VOLUME INDEX MOD: 31.8 ML/M2
LEFT ATRIUM VOLUME INDEX: 55 ML/M2
LEFT ATRIUM VOLUME MOD: 65.88 CM3
LEFT ATRIUM VOLUME: 113.8 CM3
LEFT INTERNAL DIMENSION IN SYSTOLE: 3.58 CM (ref 2.1–4)
LEFT VENTRICLE DIASTOLIC VOLUME INDEX: 57.43 ML/M2
LEFT VENTRICLE DIASTOLIC VOLUME: 118.89 ML
LEFT VENTRICLE MASS INDEX: 81 G/M2
LEFT VENTRICLE SYSTOLIC VOLUME INDEX: 26 ML/M2
LEFT VENTRICLE SYSTOLIC VOLUME: 53.81 ML
LEFT VENTRICULAR INTERNAL DIMENSION IN DIASTOLE: 5.01 CM (ref 3.5–6)
LEFT VENTRICULAR MASS: 168.11 G
LV LATERAL E/E' RATIO: 8.67 M/S
LV SEPTAL E/E' RATIO: 11.14 M/S
LYMPHOCYTES # BLD AUTO: 1.3 K/UL (ref 1–4.8)
LYMPHOCYTES NFR BLD: 21.8 % (ref 18–48)
MAGNESIUM SERPL-MCNC: 1.6 MG/DL (ref 1.6–2.6)
MCH RBC QN AUTO: 36.3 PG (ref 27–31)
MCHC RBC AUTO-ENTMCNC: 35.2 G/DL (ref 32–36)
MCV RBC AUTO: 103 FL (ref 82–98)
MONOCYTES # BLD AUTO: 0.8 K/UL (ref 0.3–1)
MONOCYTES NFR BLD: 13.3 % (ref 4–15)
MV E'TISSUE VEL-MED: 0.1 CM/S
MV PEAK A VEL: 0.79 M/S
MV PEAK E VEL: 0.78 M/S
MV STENOSIS PRESSURE HALF TIME: 52.71 MS
MV VALVE AREA P 1/2 METHOD: 4.17 CM2
NEUTROPHILS # BLD AUTO: 3.5 K/UL (ref 1.8–7.7)
NEUTROPHILS NFR BLD: 58.1 % (ref 38–73)
NRBC BLD-RTO: 0 /100 WBC
PISA TR MAX VEL: 1.83 M/S
PLATELET # BLD AUTO: 190 K/UL (ref 150–450)
PMV BLD AUTO: 9.8 FL (ref 9.2–12.9)
POCT GLUCOSE: 120 MG/DL (ref 70–110)
POCT GLUCOSE: 131 MG/DL (ref 70–110)
POCT GLUCOSE: 132 MG/DL (ref 70–110)
POCT GLUCOSE: 83 MG/DL (ref 70–110)
POTASSIUM SERPL-SCNC: 3.9 MMOL/L (ref 3.5–5.1)
RA MAJOR: 5.36 CM
RA PRESSURE ESTIMATED: 3 MMHG
RA WIDTH: 3.3 CM
RBC # BLD AUTO: 3.33 M/UL (ref 4.6–6.2)
RIGHT VENTRICULAR END-DIASTOLIC DIMENSION: 2.95 CM
RV TB RVSP: 5 MMHG
SINUS: 3.34 CM
SODIUM SERPL-SCNC: 134 MMOL/L (ref 136–145)
STJ: 3.19 CM
TDI LATERAL: 0.09 M/S
TDI SEPTAL: 0.07 M/S
TDI: 0.08 M/S
TR MAX PG: 13 MMHG
TRICUSPID ANNULAR PLANE SYSTOLIC EXCURSION: 2.82 CM
WBC # BLD AUTO: 6.01 K/UL (ref 3.9–12.7)
Z-SCORE OF LEFT VENTRICULAR DIMENSION IN END DIASTOLE: -2.28
Z-SCORE OF LEFT VENTRICULAR DIMENSION IN END SYSTOLE: -0.58

## 2023-07-29 PROCEDURE — 36415 COLL VENOUS BLD VENIPUNCTURE: CPT | Mod: HCNC

## 2023-07-29 PROCEDURE — 25000003 PHARM REV CODE 250: Mod: HCNC

## 2023-07-29 PROCEDURE — 99233 PR SUBSEQUENT HOSPITAL CARE,LEVL III: ICD-10-PCS | Mod: HCNC,,, | Performed by: STUDENT IN AN ORGANIZED HEALTH CARE EDUCATION/TRAINING PROGRAM

## 2023-07-29 PROCEDURE — 96372 THER/PROPH/DIAG INJ SC/IM: CPT

## 2023-07-29 PROCEDURE — 83735 ASSAY OF MAGNESIUM: CPT | Mod: HCNC

## 2023-07-29 PROCEDURE — G0378 HOSPITAL OBSERVATION PER HR: HCPCS | Mod: HCNC

## 2023-07-29 PROCEDURE — 96366 THER/PROPH/DIAG IV INF ADDON: CPT

## 2023-07-29 PROCEDURE — 99233 SBSQ HOSP IP/OBS HIGH 50: CPT | Mod: HCNC,,, | Performed by: STUDENT IN AN ORGANIZED HEALTH CARE EDUCATION/TRAINING PROGRAM

## 2023-07-29 PROCEDURE — 63600175 PHARM REV CODE 636 W HCPCS: Mod: HCNC

## 2023-07-29 PROCEDURE — 63600175 PHARM REV CODE 636 W HCPCS: Mod: HCNC | Performed by: STUDENT IN AN ORGANIZED HEALTH CARE EDUCATION/TRAINING PROGRAM

## 2023-07-29 PROCEDURE — 85025 COMPLETE CBC W/AUTO DIFF WBC: CPT | Mod: HCNC

## 2023-07-29 PROCEDURE — 80048 BASIC METABOLIC PNL TOTAL CA: CPT | Mod: HCNC

## 2023-07-29 PROCEDURE — 25000003 PHARM REV CODE 250: Mod: HCNC | Performed by: STUDENT IN AN ORGANIZED HEALTH CARE EDUCATION/TRAINING PROGRAM

## 2023-07-29 RX ADMIN — VANCOMYCIN HYDROCHLORIDE 1250 MG: 1.25 INJECTION, POWDER, LYOPHILIZED, FOR SOLUTION INTRAVENOUS at 04:07

## 2023-07-29 RX ADMIN — AMLODIPINE BESYLATE 5 MG: 5 TABLET ORAL at 08:07

## 2023-07-29 RX ADMIN — LOSARTAN POTASSIUM 50 MG: 50 TABLET, FILM COATED ORAL at 08:07

## 2023-07-29 RX ADMIN — METOPROLOL SUCCINATE 50 MG: 50 TABLET, EXTENDED RELEASE ORAL at 08:07

## 2023-07-29 RX ADMIN — ENOXAPARIN SODIUM 40 MG: 40 INJECTION SUBCUTANEOUS at 04:07

## 2023-07-29 RX ADMIN — ASPIRIN 81 MG 81 MG: 81 TABLET ORAL at 08:07

## 2023-07-29 RX ADMIN — LATANOPROST 1 DROP: 50 SOLUTION OPHTHALMIC at 08:07

## 2023-07-29 NOTE — PROGRESS NOTES
Pharmacokinetic Initial Assessment: IV Vancomycin    Assessment/Plan:    Initiate intravenous vancomycin with loading dose of 1750 mg once followed by a maintenance dose of vancomycin 1250 mg IV every 24 hours  Desired empiric serum trough concentration is 10 to 15 mcg/mL  Draw vancomycin trough level 60 min prior to fourth dose on 7/30 at approximately 1600  Pharmacy will continue to follow and monitor vancomycin.      Please contact pharmacy at extension [phone] with any questions regarding this assessment.     Thank you for the consult,   Lani Watt, PanchoD       Patient brief summary:  Ej Miller is a 83 y.o. male initiated on antimicrobial therapy with IV Vancomycin for treatment of suspected skin & soft tissue infection    Drug Allergies:   Review of patient's allergies indicates:   Allergen Reactions    Ketoconazole Rash     Topical cream years ago used       Actual Body Weight:   87.1 kg    Renal Function:   Estimated Creatinine Clearance: 63.9 mL/min (based on SCr of 0.9 mg/dL).,     Dialysis Method (if applicable):  N/A    CBC (last 72 hours):  Recent Labs   Lab Result Units 07/28/23  1211   WBC K/uL 6.45   Hemoglobin g/dL 12.7*   Hematocrit % 34.9*   Platelets K/uL 198   Gran % % 67.7   Lymph % % 15.8*   Mono % % 9.0   Eosinophil % % 6.7   Basophil % % 0.5   Differential Method  Automated       Metabolic Panel (last 72 hours):  Recent Labs   Lab Result Units 07/28/23  1211   Sodium mmol/L 132*   Potassium mmol/L 4.4   Chloride mmol/L 98   CO2 mmol/L 25   Glucose mg/dL 94   BUN mg/dL 18   Creatinine mg/dL 0.9   Albumin g/dL 3.9   Total Bilirubin mg/dL 0.8   Alkaline Phosphatase U/L 71   AST U/L 19   ALT U/L 8*       Drug levels (last 3 results):  No results for input(s): VANCOMYCINRA, VANCORANDOM, VANCOMYCINPE, VANCOPEAK, VANCOMYCINTR, VANCOTROUGH in the last 72 hours.    Microbiologic Results:  Microbiology Results (last 7 days)       Procedure Component Value Units Date/Time    Blood  culture #2 **CANNOT BE ORDERED STAT** [[id number]] Collected: 07/28/23 1228    Order Status: Completed Specimen: Blood from Peripheral, Forearm, Left Updated: 07/28/23 1945     Blood Culture, Routine No Growth to date    Blood culture #1 **CANNOT BE ORDERED STAT** [[id number]] Collected: 07/28/23 1211    Order Status: Sent Specimen: Blood from Peripheral, Antecubital, Left Updated: 07/28/23 1240

## 2023-07-29 NOTE — PROGRESS NOTES
[facility] [address] [place] [address] Medicine  Progress Note    Patient Name: Ej Miller  MRN: [id number]  Patient Class: OP- Observation   Admission Date: 7/28/2023  Length of Stay: 0 days  Attending Physician: Harshil Hunt MD  Primary Care Provider: Brooklyn Tomlinson MD        Subjective:     Principal Problem:Cellulitis of left lower extremity        HPI:  Ej Miller is a 83 y.o. male with PMHx HTN, gout, OA bilateral knees, alcohol use (3-4 Vodka drinks nightly) who presents to the ED 7/28 with bilateral leg swelling for the past five days. He has some redness of his left foot and tenderness over the left lower leg. States pain is mild, not like a gout flare. Elevating the legs helps. He is stable able to ambulate, uses walker at baseline. He states he is always cold and shivering. Denies fever, chest pain, shortness of breath, cough, nausea, vomiting, abdominal pain, diarrhea, urinary changes.     In the ED, slightly hypertensive with /66. Otherwise AFVSS. No leukocytosis, stable anemia with hbg 12.7. ESR/CRP and lactic acid WNL. , trop 0.006. CXR no acute process. Left foot XR with Distal left lower leg, ankle and foot diffuse nonspecific soft tissue swelling from edema or cellulitis.  Additionally, there is questioned skin wound/ulceration at the 5th toe . Given Zosyn. Admitted to  for further management.       Overview/Hospital Course:  Pt admitted to [facility] and initiated on vanc. BL LE US without DVT, and echo did not show evidence of CHF.      Interval History: Pt seen and examined this morning on rounds. NADJA. Denies overt LE pain, but does continue to endorse swelling. Discussed continuing abx at this time and monitoring for response. Care plan reviewed. Otherwise, doing well and with no further complaints at this time.      Objective:     Vital Signs (Most Recent):  Temp: 97.5 °F (36.4 °C) (07/29/23 1153)  Pulse: (!) 59 (07/29/23 1159)  Resp: 18 (07/29/23 1153)  BP:  135/88 (07/29/23 1153)  SpO2: 95 % (07/29/23 1153) Vital Signs (24h Range):  Temp:  [96 °F (35.6 °C)-97.5 °F (36.4 °C)] 97.5 °F (36.4 °C)  Pulse:  [58-70] 59  Resp:  [17-20] 18  SpO2:  [95 %-100 %] 95 %  BP: (134-153)/(59-88) 135/88     Weight: 87.1 kg (192 lb)  Body mass index is 26.78 kg/m².    Intake/Output Summary (Last 24 hours) at 7/29/2023 1237  Last data filed at 7/29/2023 1233  Gross per 24 hour   Intake --   Output 100 ml   Net -100 ml         Physical Exam  Gen: in NAD, appears stated age  Neuro: AAOx4, CN2-12 grossly intact BL; motor, sensory, and strength grossly intact BL  HEENT: NTNC, EOMI, PERRLA, MMM; no thyromegaly or lymphadenopathy; no JVD appreciated  CVS: RRR, no m/r/g; S1/S2 auscultated with no S3 or S4; capillary refill < 2 sec  Resp: lungs CTAB, no w/r/r; no belabored breathing or accessory muscle use appreciated   Abd: BS+ in all 4 quadrants; NTND, soft to palpation; no organomegaly appreciated   Extrem: pulses full, equal, and regular over all 4 extremities; 2+ BL LE pitting edema; RLE erythematous over the forefoot      Significant Labs: All pertinent labs within the past 24 hours have been reviewed.    Significant Imaging: I have reviewed all pertinent imaging results/findings within the past 24 hours.      Assessment/Plan:      * Cellulitis of left lower extremity  83M with swelling of the bilateral lower extremities x5 days with some mild pain and erythema of the left foot. Signs of cellulitis of the left foot/ankle on exam with drainage.  -Afebrile and without leukocytosis. ESR/CRP and LA wnl. BNP elevated.   -XR left foot with signs of edema/cellulittis  -Continue vancomycin  -Echo and LE US WNL  -Elevate legs    Leg swelling  - BL LE US without DVT  - No evidence of CHF  - Likely related to chronic venous stasis and concurrent cellulitis    Onychomycosis  -Noted on exam. Rash from ketoconazole in the past.     Elevated brain natriuretic peptide (BNP) level  -Noted on admission.  Denies HF history  -BLE edema on exam  -Check TTE    Primary open angle glaucoma of right eye, moderate stage  -Continue eye gtts    Alcohol abuse  -Drinks ~4 Vodka drinks nightly. No history of withdrawal   -CIWA Q shift    Gouty arthritis  -Chronic, stable  -No longer on allopurinol     HTN (hypertension)  -/66 on admission  -Continue home amlodipine, metoprolol, losartan       VTE Risk Mitigation (From admission, onward)         Ordered     enoxaparin injection 40 mg  Daily         07/28/23 1426     IP VTE HIGH RISK PATIENT  Once         07/28/23 1426     Place sequential compression device  Until discontinued         07/28/23 1426     Place sequential compression device  Until discontinued         07/28/23 1426                Discharge Planning   SACHIN: 7/30/2023     Code Status: Full Code   Is the patient medically ready for discharge?: No    Reason for patient still in hospital (select all that apply): Patient trending condition           Harshil Hunt MD  Attending Physician  Medical Director - [facility]  Department of Hospital Medicine  7/29/2023

## 2023-07-29 NOTE — SUBJECTIVE & OBJECTIVE
Interval History: Pt seen and examined this morning on betty SAEZ. Denies overt LE pain, but does continue to endorse swelling. Discussed continuing abx at this time and monitoring for response. Care plan reviewed. Otherwise, doing well and with no further complaints at this time.      Objective:     Vital Signs (Most Recent):  Temp: 97.5 °F (36.4 °C) (07/29/23 1153)  Pulse: (!) 59 (07/29/23 1159)  Resp: 18 (07/29/23 1153)  BP: 135/88 (07/29/23 1153)  SpO2: 95 % (07/29/23 1153) Vital Signs (24h Range):  Temp:  [96 °F (35.6 °C)-97.5 °F (36.4 °C)] 97.5 °F (36.4 °C)  Pulse:  [58-70] 59  Resp:  [17-20] 18  SpO2:  [95 %-100 %] 95 %  BP: (134-153)/(59-88) 135/88     Weight: 87.1 kg (192 lb)  Body mass index is 26.78 kg/m².    Intake/Output Summary (Last 24 hours) at 7/29/2023 1237  Last data filed at 7/29/2023 1233  Gross per 24 hour   Intake --   Output 100 ml   Net -100 ml         Physical Exam  Gen: in NAD, appears stated age  Neuro: AAOx4, CN2-12 grossly intact BL; motor, sensory, and strength grossly intact BL  HEENT: NTNC, EOMI, PERRLA, MMM; no thyromegaly or lymphadenopathy; no JVD appreciated  CVS: RRR, no m/r/g; S1/S2 auscultated with no S3 or S4; capillary refill < 2 sec  Resp: lungs CTAB, no w/r/r; no belabored breathing or accessory muscle use appreciated   Abd: BS+ in all 4 quadrants; NTND, soft to palpation; no organomegaly appreciated   Extrem: pulses full, equal, and regular over all 4 extremities; 2+ BL LE pitting edema; RLE erythematous over the forefoot      Significant Labs: All pertinent labs within the past 24 hours have been reviewed.    Significant Imaging: I have reviewed all pertinent imaging results/findings within the past 24 hours.

## 2023-07-29 NOTE — PLAN OF CARE
CM to bedside - pt provided assessment info. Pt w/ DME in place, lives w/ spouse. Pt's spouse was discharged in May w/ home hospice thru [facility] - pt reports she had a significant decline after a fall. Pt provides her limited care w/ the assistance of 2 sons that live locally and the hospice agency. Pt may benefit from HH at discharge. Pt lives in a 2 story home w/ 1 step to enter and his bedroom and bathroom are downstairs.     JOHNATHAN Coles, RN, [facility]  Transitional Care Manager  [phone]  [email or web address]    [facility]  Discharge Assessment    Primary Care Provider: Brooklyn Tomlinson MD     Discharge Assessment (most recent)       BRIEF DISCHARGE ASSESSMENT - 07/29/23 1421          Discharge Planning    Assessment Type Discharge Planning Brief Assessment     Resource/Environmental Concerns none     Support Systems Spouse/significant other;Children     Equipment Currently Used at Home rollator;walker, rolling;cane, straight;shower chair     Current Living Arrangements home     Patient/Family Anticipates Transition to home with family     Patient/Family Anticipated Services at Transition none     DME Needed Upon Discharge  none     Discharge Plan A Home with family     Discharge Plan B Home with family;Home Health        Physical Activity    On average, how many days per week do you engage in moderate to strenuous exercise (like a brisk walk)? 0 days     On average, how many minutes do you engage in exercise at this level? 0 min        Financial Resource Strain    How hard is it for you to pay for the very basics like food, housing, medical care, and heating? Not hard at all        Housing Stability    In the last 12 months, was there a time when you were not able to pay the mortgage or rent on time? No     In the last 12 months, how many places have you lived? 1     In the last 12 months, was there a time when you did not have a steady place to sleep or slept in a shelter (including  now)? No        Transportation Needs    In the past 12 months, has lack of transportation kept you from medical appointments or from getting medications? No     In the past 12 months, has lack of transportation kept you from meetings, work, or from getting things needed for daily living? No        Food Insecurity    Within the past 12 months, you worried that your food would run out before you got the money to buy more. Never true     Within the past 12 months, the food you bought just didn't last and you didn't have money to get more. Never true        Stress    Do you feel stress - tense, restless, nervous, or anxious, or unable to sleep at night because your mind is troubled all the time - these days? Only a little        Social Connections    In a typical week, how many times do you talk on the phone with family, friends, or neighbors? More than three times a week     How often do you get together with friends or relatives? More than three times a week     How often do you attend Roman Catholic or Hindu services? Patient refused     Do you belong to any clubs or organizations such as Roman Catholic groups, unions, fraternal or athletic groups, or school groups? Patient refused     How often do you attend meetings of the clubs or organizations you belong to? Patient refused     Are you , , , , never , or living with a partner?         Alcohol Use    Q1: How often do you have a drink containing alcohol? 4 or more times a week     Q2: How many drinks containing alcohol do you have on a typical day when you are drinking? 3 or 4     Q3: How often do you have six or more drinks on one occasion? Never

## 2023-07-29 NOTE — HOSPITAL COURSE
Pt admitted to [facility] and initiated on vanc. BL LE US without DVT, and echo did not show evidence of CHF. Clinically improved into 7/30, remained stable. Pt deemed appropriate for discharge to complete PO course of doxy/keflex at home. Plan discussed with pt, who was agreeable and amenable; medications were discussed and reviewed, outpatient follow-up scheduled, ER precautions were given, all questions were answered to the pt's satisfaction, and Mr. Miller was subsequently discharged.

## 2023-07-29 NOTE — ASSESSMENT & PLAN NOTE
- BL LE US without DVT  - No evidence of CHF  - Likely related to chronic venous stasis and concurrent cellulitis

## 2023-07-29 NOTE — ASSESSMENT & PLAN NOTE
83M with swelling of the bilateral lower extremities x5 days with some mild pain and erythema of the left foot. Signs of cellulitis of the left foot/ankle on exam with drainage.  -Afebrile and without leukocytosis. ESR/CRP and LA wnl. BNP elevated.   -XR left foot with signs of edema/cellulittis  -Continue vancomycin  -Echo and LE US WNL  -Elevate legs

## 2023-07-30 VITALS
BODY MASS INDEX: 26.88 KG/M2 | HEIGHT: 71 IN | WEIGHT: 192 LBS | HEART RATE: 73 BPM | RESPIRATION RATE: 18 BRPM | TEMPERATURE: 99 F | DIASTOLIC BLOOD PRESSURE: 60 MMHG | SYSTOLIC BLOOD PRESSURE: 131 MMHG | OXYGEN SATURATION: 94 %

## 2023-07-30 LAB
ANION GAP SERPL CALC-SCNC: 8 MMOL/L (ref 8–16)
BASOPHILS # BLD AUTO: 0.04 K/UL (ref 0–0.2)
BASOPHILS NFR BLD: 0.8 % (ref 0–1.9)
BUN SERPL-MCNC: 13 MG/DL (ref 8–23)
CALCIUM SERPL-MCNC: 9 MG/DL (ref 8.7–10.5)
CHLORIDE SERPL-SCNC: 102 MMOL/L (ref 95–110)
CO2 SERPL-SCNC: 24 MMOL/L (ref 23–29)
CREAT SERPL-MCNC: 0.8 MG/DL (ref 0.5–1.4)
DIFFERENTIAL METHOD: ABNORMAL
EOSINOPHIL # BLD AUTO: 0.4 K/UL (ref 0–0.5)
EOSINOPHIL NFR BLD: 7.5 % (ref 0–8)
ERYTHROCYTE [DISTWIDTH] IN BLOOD BY AUTOMATED COUNT: 12 % (ref 11.5–14.5)
EST. GFR  (NO RACE VARIABLE): >60 ML/MIN/1.73 M^2
GLUCOSE SERPL-MCNC: 97 MG/DL (ref 70–110)
HCT VFR BLD AUTO: 34.7 % (ref 40–54)
HGB BLD-MCNC: 12.1 G/DL (ref 14–18)
IMM GRANULOCYTES # BLD AUTO: 0.02 K/UL (ref 0–0.04)
IMM GRANULOCYTES NFR BLD AUTO: 0.4 % (ref 0–0.5)
LYMPHOCYTES # BLD AUTO: 1.3 K/UL (ref 1–4.8)
LYMPHOCYTES NFR BLD: 26.6 % (ref 18–48)
MCH RBC QN AUTO: 36.2 PG (ref 27–31)
MCHC RBC AUTO-ENTMCNC: 34.9 G/DL (ref 32–36)
MCV RBC AUTO: 104 FL (ref 82–98)
MONOCYTES # BLD AUTO: 0.7 K/UL (ref 0.3–1)
MONOCYTES NFR BLD: 13.5 % (ref 4–15)
NEUTROPHILS # BLD AUTO: 2.6 K/UL (ref 1.8–7.7)
NEUTROPHILS NFR BLD: 51.2 % (ref 38–73)
NRBC BLD-RTO: 0 /100 WBC
PLATELET # BLD AUTO: 174 K/UL (ref 150–450)
PMV BLD AUTO: 9.4 FL (ref 9.2–12.9)
POTASSIUM SERPL-SCNC: 4.4 MMOL/L (ref 3.5–5.1)
RBC # BLD AUTO: 3.34 M/UL (ref 4.6–6.2)
SODIUM SERPL-SCNC: 134 MMOL/L (ref 136–145)
WBC # BLD AUTO: 5.04 K/UL (ref 3.9–12.7)

## 2023-07-30 PROCEDURE — 85025 COMPLETE CBC W/AUTO DIFF WBC: CPT | Mod: HCNC

## 2023-07-30 PROCEDURE — 99239 HOSP IP/OBS DSCHRG MGMT >30: CPT | Mod: HCNC,,, | Performed by: STUDENT IN AN ORGANIZED HEALTH CARE EDUCATION/TRAINING PROGRAM

## 2023-07-30 PROCEDURE — 25000003 PHARM REV CODE 250: Mod: HCNC

## 2023-07-30 PROCEDURE — 36415 COLL VENOUS BLD VENIPUNCTURE: CPT | Mod: HCNC

## 2023-07-30 PROCEDURE — 80048 BASIC METABOLIC PNL TOTAL CA: CPT | Mod: HCNC

## 2023-07-30 PROCEDURE — 94761 N-INVAS EAR/PLS OXIMETRY MLT: CPT | Mod: HCNC

## 2023-07-30 PROCEDURE — 25000003 PHARM REV CODE 250: Mod: HCNC | Performed by: STUDENT IN AN ORGANIZED HEALTH CARE EDUCATION/TRAINING PROGRAM

## 2023-07-30 PROCEDURE — G0378 HOSPITAL OBSERVATION PER HR: HCPCS | Mod: HCNC

## 2023-07-30 PROCEDURE — 99239 PR HOSPITAL DISCHARGE DAY,>30 MIN: ICD-10-PCS | Mod: HCNC,,, | Performed by: STUDENT IN AN ORGANIZED HEALTH CARE EDUCATION/TRAINING PROGRAM

## 2023-07-30 RX ORDER — CEPHALEXIN 500 MG/1
500 CAPSULE ORAL EVERY 8 HOURS
Status: DISCONTINUED | OUTPATIENT
Start: 2023-07-30 | End: 2023-07-30 | Stop reason: HOSPADM

## 2023-07-30 RX ORDER — DOXYCYCLINE HYCLATE 100 MG
100 TABLET ORAL EVERY 12 HOURS
Qty: 22 TABLET | Refills: 0 | Status: SHIPPED | OUTPATIENT
Start: 2023-07-30 | End: 2023-08-10

## 2023-07-30 RX ORDER — DOXYCYCLINE HYCLATE 100 MG
100 TABLET ORAL EVERY 12 HOURS
Status: DISCONTINUED | OUTPATIENT
Start: 2023-07-30 | End: 2023-07-30 | Stop reason: HOSPADM

## 2023-07-30 RX ORDER — CEPHALEXIN 500 MG/1
500 CAPSULE ORAL EVERY 8 HOURS
Qty: 33 CAPSULE | Refills: 0 | Status: SHIPPED | OUTPATIENT
Start: 2023-07-30 | End: 2023-08-10

## 2023-07-30 RX ADMIN — DOXYCYCLINE HYCLATE 100 MG: 100 TABLET, COATED ORAL at 08:07

## 2023-07-30 RX ADMIN — AMLODIPINE BESYLATE 5 MG: 5 TABLET ORAL at 08:07

## 2023-07-30 RX ADMIN — POLYETHYLENE GLYCOL 3350 17 G: 17 POWDER, FOR SOLUTION ORAL at 08:07

## 2023-07-30 RX ADMIN — LOSARTAN POTASSIUM 50 MG: 50 TABLET, FILM COATED ORAL at 08:07

## 2023-07-30 RX ADMIN — ASPIRIN 81 MG 81 MG: 81 TABLET ORAL at 08:07

## 2023-07-30 RX ADMIN — METOPROLOL SUCCINATE 50 MG: 50 TABLET, EXTENDED RELEASE ORAL at 08:07

## 2023-07-30 NOTE — PROGRESS NOTES
VANCOMYCIN DOSING BY PHARMACY DISCONTINUATION NOTE    Ej Miller is a 83 y.o. male who had been consulted for vancomycin dosing.    The pharmacy consult for vancomycin dosing has been discontinued.     Vancomycin Dosing by Pharmacy Consult will sign-off. Please reconsult if necessary. Thank you for allowing us to participate in this patient's care.       Precious Doss, PanchoD

## 2023-07-30 NOTE — PROGRESS NOTES
Reviewed avs with pt and son. No questions at this time . Iv removed and waiting for wheelchair. Pt ready for discharge.

## 2023-07-30 NOTE — PLAN OF CARE
[facility]      HOME HEALTH ORDERS  FACE TO FACE ENCOUNTER    Patient Name: Ej Miller  YOB: 1940    PCP: Brooklyn Tomlinson MD   [facility] [address] [facility] [place] [address]  PCP Phone Number: [phone]  PCP Fax: [phone]    Encounter Date: 7/28/23    Admit to Home Health    Diagnoses:  Active Hospital Problems    Diagnosis  POA    *Cellulitis of left lower extremity [L03.116]  Yes     Priority: 1 - High    Leg swelling [M79.89]  Yes     Priority: 2     Elevated brain natriuretic peptide (BNP) level [R79.89]  Yes    Onychomycosis [B35.1]  Yes    Primary open angle glaucoma of right eye, moderate stage [H40.1112]  Yes     Chronic    Alcohol abuse [F10.10]  Yes     Chronic    Gouty arthritis [M10.9]  Yes     Chronic    HTN (hypertension) [I10]  Yes     Chronic      Resolved Hospital Problems   No resolved problems to display.       Follow Up Appointments:  Future Appointments   Date Time Provider Department Center   10/23/2023  2:30 PM Brooklyn Tomlinson MD [facility]       Allergies:  Review of patient's allergies indicates:   Allergen Reactions    Ketoconazole Rash     Topical cream years ago used       Medications: Review discharge medications with patient and family and provide education.    Current Facility-Administered Medications   Medication Dose Route Frequency Provider Last Rate Last Admin    acetaminophen tablet 1,000 mg  1,000 mg Oral Q8H PRN Valencia Garcia PA-C        acetaminophen tablet 650 mg  650 mg Oral Q4H PRN Valencia Garcia PA-C        albuterol-ipratropium 2.5 mg-0.5 mg/3 mL nebulizer solution 3 mL  3 mL Nebulization Q4H PRN Valencia Garcia PA-C        aluminum-magnesium hydroxide-simethicone 200-200-20 mg/5 mL suspension 30 mL  30 mL Oral QID PRN Valencia Garcia PA-C        amLODIPine tablet 5 mg  5 mg Oral Daily Valencia Garcia PA-C   5 mg at 07/29/23 0838    aspirin chewable tablet 81 mg  81 mg Oral Daily  Valencia Garcia PA-C   81 mg at 07/29/23 0839    bisacodyL suppository 10 mg  10 mg Rectal Daily PRN Valencia Garcia PA-C        cephALEXin capsule 500 mg  500 mg Oral Q8H Harshil Hunt MD        dextrose 10% bolus 125 mL 125 mL  12.5 g Intravenous PRN Harshil Hunt MD        dextrose 10% bolus 250 mL 250 mL  25 g Intravenous PRN Harshil Hunt MD        doxycycline tablet 100 mg  100 mg Oral Q12H Harshil Hunt MD        enoxaparin injection 40 mg  40 mg Subcutaneous Daily Valencia Garcia PA-C   40 mg at 07/29/23 1638    glucagon (human recombinant) injection 1 mg  1 mg Intramuscular PRN Valencia Garcia PA-C        glucose chewable tablet 16 g  16 g Oral PRN Valencia Garcia PA-C        glucose chewable tablet 24 g  24 g Oral PRN Valencia Garcia PA-C        HYDROcodone-acetaminophen  mg per tablet 1 tablet  1 tablet Oral Q6H PRN Valencia Garcia PA-C        latanoprost 0.005 % ophthalmic solution 1 drop  1 drop Both Eyes QHS Valencia Garcia PA-C   1 drop at 07/29/23 2056    losartan tablet 50 mg  50 mg Oral Daily Valnecia Garcia PA-C   50 mg at 07/29/23 0838    melatonin tablet 6 mg  6 mg Oral Nightly PRN Rodriguez Anand III, MD        metoprolol succinate (TOPROL-XL) 24 hr tablet 50 mg  50 mg Oral Daily Valencia Garcia PA-C   50 mg at 07/29/23 0839    naloxone 0.4 mg/mL injection 0.02 mg  0.02 mg Intravenous PRN Valencia Garcia PA-C        ondansetron disintegrating tablet 8 mg  8 mg Oral Q8H PRN Valencia Garcia PA-C        polyethylene glycol packet 17 g  17 g Oral BID PRN Valencia Garcia PA-C        prochlorperazine injection Soln 5 mg  5 mg Intravenous Q6H PRN Valencia J. Garcia, PA-C        simethicone chewable tablet 80 mg  1 tablet Oral QID PRN Valencia Garcia PA-C        sodium chloride 0.9% flush 10 mL  10 mL Intravenous PRN Rodriguez Anand III, MD        sodium chloride 0.9% flush 5 mL  5 mL Intravenous PRN Valencia Garcia PA-C         Current  Discharge Medication List        START taking these medications    Details   cephALEXin (KEFLEX) 500 MG capsule Take 1 capsule (500 mg total) by mouth every 8 (eight) hours. for 11 days  Qty: 33 capsule, Refills: 0      doxycycline (VIBRA-TABS) 100 MG tablet Take 1 tablet (100 mg total) by mouth every 12 (twelve) hours. for 11 days  Qty: 22 tablet, Refills: 0           CONTINUE these medications which have NOT CHANGED    Details   amLODIPine (NORVASC) 5 MG tablet TAKE 1 TABLET EVERY DAY  Qty: 90 tablet, Refills: 0    Associated Diagnoses: Essential hypertension      aspirin 81 MG Chew Take 81 mg by mouth once daily.      betamethasone dipropionate (DIPROLENE) 0.05 % cream Use bid prn rash  Qty: 90 g, Refills: 3    Associated Diagnoses: Dermatitis      BROMSITE 0.075 % Drop INSTILL 1 DROP INTO THE RIGHT EYE TWO TIMES A DAY  Refills: 0      clobetasoL (TEMOVATE) 0.05 % cream Apply topically 2 (two) times daily.  Qty: 60 g, Refills: 3      dorzolamide-timolol 2-0.5% (COSOPT) 22.3-6.8 mg/mL ophthalmic solution       latanoprost 0.005 % ophthalmic solution       losartan (COZAAR) 50 MG tablet TAKE 1 TABLET EVERY DAY  Qty: 90 tablet, Refills: 0    Associated Diagnoses: Hypertension      metoprolol succinate (TOPROL-XL) 50 MG 24 hr tablet Take 1 tablet (50 mg total) by mouth once daily.  Qty: 90 tablet, Refills: 3    Comments: Please inactivate all prior scripts with same name and strength including on holds.  Associated Diagnoses: Essential hypertension      MULTIVIT-MIN/FA/LYCOPEN/LUTEIN (MEN 50 PLUS MULTIVITAMIN ORAL) Take by mouth.      tobramycin-dexAMETHasone 0.3-0.1% (TOBRADEX) 0.3-0.1 % DrpS Place 1 drop into the right eye every morning.               I have seen and examined this patient within the last 30 days. My clinical findings that support the need for the home health skilled services and home bound status are the following:no   Weakness/numbness causing balance and gait disturbance due to Weakness/Debility  making it taxing to leave home.  Requiring assistive device to leave home due to unsteady gait caused by  Weakness/Debility.     Diet:   cardiac diet    Labs:  Report Lab results to PCP.    Referrals/ Consults  Physical Therapy to evaluate and treat. Evaluate for home safety and equipment needs; Establish/upgrade home exercise program. Perform / instruct on therapeutic exercises, gait training, transfer training, and Range of Motion.  Occupational Therapy to evaluate and treat. Evaluate home environment for safety and equipment needs. Perform/Instruct on transfers, ADL training, ROM, and therapeutic exercises.  Aide to provide assistance with personal care, ADLs, and vital signs.    Activities:   activity as tolerated    Nursing:   Agency to admit patient within 24 hours of hospital discharge unless specified on physician order or at patient request    SN to complete comprehensive assessment including routine vital signs. Instruct on disease process and s/s of complications to report to MD. Review/verify medication list sent home with the patient at time of discharge  and instruct patient/caregiver as needed. Frequency may be adjusted depending on start of care date.     Skilled nurse to perform up to 3 visits PRN for symptoms related to diagnosis    Notify MD if SBP > 160 or < 90; DBP > 90 or < 50; HR > 120 or < 50; Temp > 101; O2 < 88%  Ok to schedule additional visits based on staff availability and patient request on consecutive days within the home health episode.    When multiple disciplines ordered:    Start of Care occurs on Sunday - Wednesday schedule remaining discipline evaluations as ordered on separate consecutive days following the start of care.    Thursday SOC -schedule subsequent evaluations Friday and Monday the following week.     Friday - Saturday SOC - schedule subsequent discipline evaluations on consecutive days starting Monday of the following week.    For all post-discharge communication and  subsequent orders please contact patient's primary care physician.     Miscellaneous   NA    Home Health Aide:  Nursing Three times weekly, Physical Therapy Three times weekly, Occupational Therapy Three times weekly, and Home Health Aide Three times weekly    Wound Care Orders  no    I certify that this patient is confined to his home and needs intermittent skilled nursing care, physical therapy, and occupational therapy.        Harshil Hunt MD  Attending Physician  Medical Director - [facility]  Department of Hospital Medicine  7/30/2023

## 2023-07-30 NOTE — PLAN OF CARE
[facility]  Discharge Final Note    Primary Care Provider: Brooklyn Tomlinson MD    Expected Discharge Date: 7/30/2023    Final Discharge Note (most recent)       Final Note - 07/30/23 1842          Final Note    Assessment Type Final Discharge Note (P)      Anticipated Discharge Disposition Home-Health Care Svc (P)      Hospital Resources/Appts/Education Provided Provided patient/caregiver with written discharge plan information;Appointments scheduled and added to AVS;Provided education on problems/symptoms using teachback (P)         Post-Acute Status    Post-Acute Authorization Home Health (P)      Home Health Status Pending Payor Review (P)      Discharge Delays None known at this time (P)                      Important Message from Medicare             Contact Info       Brooklyn Tomlinson MD   Specialty: Internal Medicine   Relationship: PCP - General    [address]  [facility] [address]   Phone: [phone]       Next Steps: Schedule an appointment as soon as possible for a visit          Pt. discharging to home with [facility]. Family transporting pt. home upon discharge.     Susan Dickens LMSW

## 2023-07-30 NOTE — DISCHARGE SUMMARY
[facility] - Observation [address] [place] [address] Medicine  Discharge Summary      Patient Name: Ej Miller  MRN: [id number]  JOHNNY: [id number]  Patient Class: OP- Observation  Admission Date: 7/28/2023  Hospital Length of Stay: 0 days  Discharge Date and Time:  07/30/2023 8:30 AM  Attending Physician: Harshil Hunt MD   Discharging Provider: Harshil Hunt MD  Primary Care Provider: Brooklyn Tomlinson MD  [facility] Medicine Team: [facility] MED  Harshil Hunt MD  Primary Care Team: [facility]    HPI:   Ej Miller is a 83 y.o. male with PMHx HTN, gout, OA bilateral knees, alcohol use (3-4 Vodka drinks nightly) who presents to the ED 7/28 with bilateral leg swelling for the past five days. He has some redness of his left foot and tenderness over the left lower leg. States pain is mild, not like a gout flare. Elevating the legs helps. He is stable able to ambulate, uses walker at baseline. He states he is always cold and shivering. Denies fever, chest pain, shortness of breath, cough, nausea, vomiting, abdominal pain, diarrhea, urinary changes.     In the ED, slightly hypertensive with /66. Otherwise AFVSS. No leukocytosis, stable anemia with hbg 12.7. ESR/CRP and lactic acid WNL. , trop 0.006. CXR no acute process. Left foot XR with Distal left lower leg, ankle and foot diffuse nonspecific soft tissue swelling from edema or cellulitis.  Additionally, there is questioned skin wound/ulceration at the 5th toe . Given Zosyn. Admitted to  for further management.       * No surgery found *      Hospital Course:   Pt admitted to [facility] and initiated on vanc. BL LE US without DVT, and echo did not show evidence of CHF. Clinically improved into 7/30, remained stable. Pt deemed appropriate for discharge to complete PO course of doxy/keflex at home. Plan discussed with pt, who was agreeable and amenable; medications were discussed and reviewed, outpatient follow-up scheduled, ER precautions were given,  all questions were answered to the pt's satisfaction, and Mr. Miller was subsequently discharged.         Goals of Care Treatment Preferences:  Code Status: Full Code      Consults:     No new Assessment & Plan notes have been filed under this hospital service since the last note was generated.  Service: Hospital Medicine    Final Active Diagnoses:    Diagnosis Date Noted POA    PRINCIPAL PROBLEM:  Cellulitis of left lower extremity [L03.116] 07/28/2023 Yes    Leg swelling [M79.89] 07/28/2023 Yes    Elevated brain natriuretic peptide (BNP) level [R79.89] 07/28/2023 Yes    Onychomycosis [B35.1] 07/28/2023 Yes    Primary open angle glaucoma of right eye, moderate stage [H40.1112] 07/07/2016 Yes     Chronic    Alcohol abuse [F10.10] 05/06/2013 Yes     Chronic    Gouty arthritis [M10.9] 01/23/2013 Yes     Chronic    HTN (hypertension) [I10]  Yes     Chronic      Problems Resolved During this Admission:       Discharged Condition: stable    Disposition: Home or Self Care    Follow Up:   Follow-up Information     Brooklyn Tomlinson MD. Schedule an appointment as soon as possible for a visit.    Specialty: Internal Medicine  Contact information:  [address]  [place] [address]  [phone]                       Patient Instructions:      Ambulatory referral/consult to Podiatry   Standing Status: Future   Referral Priority: Routine Referral Type: Consultation   Referral Reason: Specialty Services Required   Requested Specialty: Podiatry   Number of Visits Requested: 1     Ambulatory referral/consult to Internal Medicine   Standing Status: Future   Referral Priority: Routine Referral Type: Consultation   Referral Reason: Specialty Services Required   Requested Specialty: Internal Medicine   Number of Visits Requested: 1     Diet Cardiac     Notify your health care provider if you experience any of the following:  increased confusion or weakness     Notify your health care provider if you experience any of  the following:  persistent dizziness, light-headedness, or visual disturbances     Notify your health care provider if you experience any of the following:  worsening rash     Notify your health care provider if you experience any of the following:  severe persistent headache     Notify your health care provider if you experience any of the following:  difficulty breathing or increased cough     Notify your health care provider if you experience any of the following:  severe uncontrolled pain     Notify your health care provider if you experience any of the following:  persistent nausea and vomiting or diarrhea     Notify your health care provider if you experience any of the following:  temperature >100.4     Activity as tolerated       Significant Diagnostic Studies: Labs: All labs within the past 24 hours have been reviewed    Pending Diagnostic Studies:     None         Medications:  Reconciled Home Medications:      Medication List      START taking these medications    cephALEXin 500 MG capsule  Commonly known as: KEFLEX  Take 1 capsule (500 mg total) by mouth every 8 (eight) hours. for 11 days     doxycycline 100 MG tablet  Commonly known as: VIBRA-TABS  Take 1 tablet (100 mg total) by mouth every 12 (twelve) hours. for 11 days        CONTINUE taking these medications    amLODIPine 5 MG tablet  Commonly known as: NORVASC  TAKE 1 TABLET EVERY DAY     aspirin 81 MG Chew  Take 81 mg by mouth once daily.     betamethasone dipropionate 0.05 % cream  Use bid prn rash     BROMSITE 0.075 % Drop  Generic drug: bromfenac  INSTILL 1 DROP INTO THE RIGHT EYE TWO TIMES A DAY     clobetasoL 0.05 % cream  Commonly known as: TEMOVATE  Apply topically 2 (two) times daily.     dorzolamide-timolol 2-0.5% 22.3-6.8 mg/mL ophthalmic solution  Commonly known as: COSOPT     latanoprost 0.005 % ophthalmic solution     losartan 50 MG tablet  Commonly known as: COZAAR  TAKE 1 TABLET EVERY DAY     MEN 50 PLUS MULTIVITAMIN ORAL  Take by  mouth.     metoprolol succinate 50 MG 24 hr tablet  Commonly known as: TOPROL-XL  Take 1 tablet (50 mg total) by mouth once daily.     tobramycin-dexAMETHasone 0.3-0.1% 0.3-0.1 % Drps  Commonly known as: TOBRADEX  Place 1 drop into the right eye every morning.            Indwelling Lines/Drains at time of discharge:   Lines/Drains/Airways     None                 Time spent on the discharge of patient: 35 minutes         Harshil Hunt MD  Attending Physician  Medical Director - [facility]  Department of Hospital Medicine  7/30/2023

## 2023-07-30 NOTE — PLAN OF CARE
07/30/23 1050   Post-Acute Status   Post-Acute Authorization Home Health   Home Health Status Referrals Sent   Patient choice form signed by patient/caregiver List with quality metrics by geographic area provided;List from System Post-Acute Care   Discharge Plan   Discharge Plan A Home Health   Discharge Plan B Home with family     Home Health referrals sent via [employer]Port to [employer] contracted agencies from [employer] website.     Susan Dickens LMSW

## 2023-07-31 PROCEDURE — G0180 PR HOME HEALTH MD CERTIFICATION: ICD-10-PCS | Mod: ,,, | Performed by: INTERNAL MEDICINE

## 2023-07-31 PROCEDURE — G0180 MD CERTIFICATION HHA PATIENT: HCPCS | Mod: ,,, | Performed by: INTERNAL MEDICINE

## 2023-08-02 DIAGNOSIS — I10 ESSENTIAL HYPERTENSION: ICD-10-CM

## 2023-08-02 DIAGNOSIS — I10 HYPERTENSION: ICD-10-CM

## 2023-08-02 LAB
BACTERIA BLD CULT: NORMAL
BACTERIA BLD CULT: NORMAL

## 2023-08-02 RX ORDER — AMLODIPINE BESYLATE 5 MG/1
TABLET ORAL
Qty: 90 TABLET | Refills: 3 | Status: SHIPPED | OUTPATIENT
Start: 2023-08-02

## 2023-08-02 RX ORDER — LOSARTAN POTASSIUM 50 MG/1
TABLET ORAL
Qty: 90 TABLET | Refills: 3 | Status: SHIPPED | OUTPATIENT
Start: 2023-08-02

## 2023-08-02 NOTE — TELEPHONE ENCOUNTER
Refill Routing Note   Medication(s) are not appropriate for processing by [facility] for the following reason(s):      No active prescription written by provider    [facility] action(s):  Defer Care Due:  None identified          Pharmacist review requested: Yes     Appointments  past 12m or future 3m with PCP    Date Provider   Last Visit   6/20/2023 Brooklyn Tomlinson MD   Next Visit   10/23/2023 Brooklyn Tomlinson MD   ED visits in past 90 days: 0        Note composed:2:45 PM 08/02/2023

## 2023-08-02 NOTE — TELEPHONE ENCOUNTER
No care due was identified.  [facility] Embedded Care Due Messages. Reference number: [id number].   8/02/2023 10:38:26 AM CDT

## 2023-08-02 NOTE — TELEPHONE ENCOUNTER
Refill request routed to [facility] Review Pool for Pharmacist Review.

## 2023-08-03 NOTE — TELEPHONE ENCOUNTER
staff called EMS and EKG noted possible A-fib w/ rates 44-80's, pt denied sx's and refused to go to ER for eval.     1755 Called On-call  and he agreed it is ok to hold Metoprolol today and have someone check hr before giving this med.     1758 Conference call w/ pt's son and Astrid  -  explained On-call staff advised hold Metoprolol today and have someone check he before giving this med.     Laith (pt's son) agreed and also agreed to no added salt to meals, and adding more water to daily diet before lunch.  Suggested pt lay down for approximately 1 hr during the day to give the heart a rest.      ----- Message from Marely Lane MA sent at 8/3/2023  4:30 PM CDT -----  Contact: [facility]/Astrid/    ----- Message -----  From: Nu Maddox  Sent: 8/3/2023   4:27 PM CDT  To: Davi OLMEDO Staff    Type: Home Health (orders, updates, clarifications, etc.)    Home Health Agency/Nurse:[facility]/Astrid    Phone number:[phone]    Reason for call: pt's heart rate is 47     Comments: pt has not taken his Metoprolol today please advise

## 2023-08-04 ENCOUNTER — TELEPHONE (OUTPATIENT)
Dept: INTERNAL MEDICINE | Facility: CLINIC | Age: 83
End: 2023-08-04
Payer: MEDICARE

## 2023-08-04 NOTE — TELEPHONE ENCOUNTER
----- Message from Sara Guerra sent at 8/4/2023  2:07 PM CDT -----  Contact: Astrid with [facility]  [phone]  Astrid with [facility] called requesting a urgent call back from Dr. Tomlinson or her nurse, the patient is having a heart rate in the forties

## 2023-08-04 NOTE — TELEPHONE ENCOUNTER
Rec'd call from Astrid explaining his resting HR is I the 40's,  OT staff visited today and baseline hr 46 then after exercise hr increased to 80's but at rest pt hr returned to 47.  Pt has not had bp meds today.     amLODIPine (NORVASC) 5 MG tablet    metoprolol succinate (TOPROL-XL) 50 MG 24 hr tablet    losartan (COZAAR) 50 MG tablet

## 2023-08-10 ENCOUNTER — TELEPHONE (OUTPATIENT)
Dept: INTERNAL MEDICINE | Facility: CLINIC | Age: 83
End: 2023-08-10
Payer: MEDICARE

## 2023-08-10 DIAGNOSIS — R26.9 ABNORMALITY OF GAIT AND MOBILITY: Primary | ICD-10-CM

## 2023-08-10 NOTE — TELEPHONE ENCOUNTER
----- Message from Mariam Chang sent at 8/10/2023 12:32 PM CDT -----  Contact: Phone [phone] Ms. Kia Adam an Occupational Therapist at [employer] would like to put in an order for a Rolator Walker for the patient. Please fax the order over to [facility].     [facility] fax # [phone].

## 2023-08-21 ENCOUNTER — TELEPHONE (OUTPATIENT)
Dept: INTERNAL MEDICINE | Facility: CLINIC | Age: 83
End: 2023-08-21
Payer: MEDICARE

## 2023-08-21 NOTE — TELEPHONE ENCOUNTER
----- Message from Laine Abdalla sent at 8/18/2023  4:27 PM CDT -----  Contact: Latonia newman/[facility] [phone]  Latonia is calling in regards to an order for Dr Tomlinson to sign. Per Latonia, is calling to find how she would like the order sent. Per Latonia, for some reason there delivery method is showing other.                   Thank you

## 2023-09-06 ENCOUNTER — TELEPHONE (OUTPATIENT)
Dept: INTERNAL MEDICINE | Facility: CLINIC | Age: 83
End: 2023-09-06
Payer: MEDICARE

## 2023-09-06 NOTE — TELEPHONE ENCOUNTER
Spoke to Luz and she said she will give the message to Latonia and have her call tomorrow.    ----- Message from Marely Lane MA sent at 9/6/2023  3:57 PM CDT -----  Contact: Latonia newman/[facility]  [phone]    ----- Message -----  From: Sandy Watson  Sent: 9/6/2023   3:29 PM CDT  To: Davi [facility]O Staff    Latonia with [facility] called in regards the plan of care for the patient. Please call at [phone]. Thank you.

## 2023-09-07 ENCOUNTER — DOCUMENT SCAN (OUTPATIENT)
Dept: HOME HEALTH SERVICES | Facility: HOSPITAL | Age: 83
End: 2023-09-07
Payer: MEDICARE

## 2023-09-07 ENCOUNTER — TELEPHONE (OUTPATIENT)
Dept: PODIATRY | Facility: CLINIC | Age: 83
End: 2023-09-07
Payer: MEDICARE

## 2023-09-07 NOTE — TELEPHONE ENCOUNTER
Spoke to sonia Lincoln'vel order and plan of care and faxed info to [facility] today.

## 2023-09-07 NOTE — TELEPHONE ENCOUNTER
Spoke with patient caregiver/son in reference to appointment being rescheduled voice understanding to new appointment date and time.

## 2023-09-11 ENCOUNTER — EXTERNAL HOME HEALTH (OUTPATIENT)
Dept: HOME HEALTH SERVICES | Facility: HOSPITAL | Age: 83
End: 2023-09-11
Payer: MEDICARE

## 2023-09-22 ENCOUNTER — TELEPHONE (OUTPATIENT)
Dept: PODIATRY | Facility: CLINIC | Age: 83
End: 2023-09-22
Payer: MEDICARE

## 2023-09-27 ENCOUNTER — PATIENT OUTREACH (OUTPATIENT)
Dept: ADMINISTRATIVE | Facility: HOSPITAL | Age: 83
End: 2023-09-27
Payer: MEDICARE

## 2023-09-27 NOTE — PROGRESS NOTES
Health Maintenance Due   Topic Date Due    Shingles Vaccine (2 of 3) 12/07/2015    COVID-19 Vaccine (3 - Moderna series) 04/26/2021    Influenza Vaccine (1) 09/01/2023     Triggered LINKS and reconciled immunizations. Updated Care Everywhere. Imported outside Episode Summary report received from [facility] into chart. Chart review completed.

## 2023-10-05 ENCOUNTER — OFFICE VISIT (OUTPATIENT)
Dept: PODIATRY | Facility: CLINIC | Age: 83
End: 2023-10-05
Payer: MEDICARE

## 2023-10-05 VITALS
SYSTOLIC BLOOD PRESSURE: 135 MMHG | DIASTOLIC BLOOD PRESSURE: 66 MMHG | HEIGHT: 71 IN | HEART RATE: 69 BPM | BODY MASS INDEX: 26.88 KG/M2 | WEIGHT: 192 LBS

## 2023-10-05 DIAGNOSIS — B35.1 ONYCHOMYCOSIS: ICD-10-CM

## 2023-10-05 DIAGNOSIS — I73.9 PAD (PERIPHERAL ARTERY DISEASE): Primary | ICD-10-CM

## 2023-10-05 PROCEDURE — 11721 DEBRIDE NAIL 6 OR MORE: CPT | Mod: Q8,HCNC,S$GLB, | Performed by: PODIATRIST

## 2023-10-05 PROCEDURE — 1126F PR PAIN SEVERITY QUANTIFIED, NO PAIN PRESENT: ICD-10-PCS | Mod: HCNC,CPTII,S$GLB, | Performed by: PODIATRIST

## 2023-10-05 PROCEDURE — 1159F MED LIST DOCD IN RCRD: CPT | Mod: HCNC,CPTII,S$GLB, | Performed by: PODIATRIST

## 2023-10-05 PROCEDURE — 1160F RVW MEDS BY RX/DR IN RCRD: CPT | Mod: HCNC,CPTII,S$GLB, | Performed by: PODIATRIST

## 2023-10-05 PROCEDURE — 1160F PR REVIEW ALL MEDS BY PRESCRIBER/CLIN PHARMACIST DOCUMENTED: ICD-10-PCS | Mod: HCNC,CPTII,S$GLB, | Performed by: PODIATRIST

## 2023-10-05 PROCEDURE — 99999 PR PBB SHADOW E&M-EST. PATIENT-LVL III: CPT | Mod: PBBFAC,HCNC,, | Performed by: PODIATRIST

## 2023-10-05 PROCEDURE — 11721 PR DEBRIDEMENT OF NAILS, 6 OR MORE: ICD-10-PCS | Mod: Q8,HCNC,S$GLB, | Performed by: PODIATRIST

## 2023-10-05 PROCEDURE — 99203 PR OFFICE/OUTPT VISIT, NEW, LEVL III, 30-44 MIN: ICD-10-PCS | Mod: 25,HCNC,S$GLB, | Performed by: PODIATRIST

## 2023-10-05 PROCEDURE — 99999 PR PBB SHADOW E&M-EST. PATIENT-LVL III: ICD-10-PCS | Mod: PBBFAC,HCNC,, | Performed by: PODIATRIST

## 2023-10-05 PROCEDURE — 99203 OFFICE O/P NEW LOW 30 MIN: CPT | Mod: 25,HCNC,S$GLB, | Performed by: PODIATRIST

## 2023-10-05 PROCEDURE — 1159F PR MEDICATION LIST DOCUMENTED IN MEDICAL RECORD: ICD-10-PCS | Mod: HCNC,CPTII,S$GLB, | Performed by: PODIATRIST

## 2023-10-05 PROCEDURE — 3078F DIAST BP <80 MM HG: CPT | Mod: HCNC,CPTII,S$GLB, | Performed by: PODIATRIST

## 2023-10-05 PROCEDURE — 1126F AMNT PAIN NOTED NONE PRSNT: CPT | Mod: HCNC,CPTII,S$GLB, | Performed by: PODIATRIST

## 2023-10-05 PROCEDURE — 3075F PR MOST RECENT SYSTOLIC BLOOD PRESS GE 130-139MM HG: ICD-10-PCS | Mod: HCNC,CPTII,S$GLB, | Performed by: PODIATRIST

## 2023-10-05 PROCEDURE — 3078F PR MOST RECENT DIASTOLIC BLOOD PRESSURE < 80 MM HG: ICD-10-PCS | Mod: HCNC,CPTII,S$GLB, | Performed by: PODIATRIST

## 2023-10-05 PROCEDURE — 3075F SYST BP GE 130 - 139MM HG: CPT | Mod: HCNC,CPTII,S$GLB, | Performed by: PODIATRIST

## 2023-10-05 NOTE — PROGRESS NOTES
Subjective:     Patient ID: Ej Miller is a 83 y.o. male.    Chief Complaint: Nail Care    Ej is a 83 y.o. male who presents to the clinic for evaluation and treatment of high risk feet. 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 The patient's chief complaint is long, thick toenails. Difficult for him to trim. No new concerns. This patient has documented high risk feet requiring routine maintenance secondary to peripheral vascular disease.    PCP: Brooklyn Tomlinson MD    Date Last Seen by PCP: 6/20/2023     Current shoe gear:   Casual shoes          Last encounter in this department: Visit date not found    Hemoglobin A1C   Date Value Ref Range Status   01/11/2017 5.6 4.5 - 6.2 % Final     Comment:     According to ADA guidelines, hemoglobin A1C <7.0% represents  optimal control in non-pregnant diabetic patients.  Different  metrics may apply to specific populations.   Standards of Medical Care in Diabetes - 2016.  For the purpose of screening for the presence of diabetes:  <5.7%     Consistent with the absence of diabetes  5.7-6.4%  Consistent with increasing risk for diabetes   (prediabetes)  >or=6.5%  Consistent with diabetes  Currently no consensus exists for use of hemoglobin A1C  for diagnosis of diabetes for children.     09/21/2015 5.7 4.5 - 6.2 % Final   03/30/2013 6.0 4.0 - 6.2 % Final       Review of Systems   Constitutional: Negative for chills and fever.   Cardiovascular:  Negative for chest pain, claudication and leg swelling.   Respiratory:  Negative for cough and shortness of breath.    Skin:  Positive for dry skin and nail changes.   Musculoskeletal: Negative.    Gastrointestinal:  Negative for nausea and vomiting.   Neurological:   Negative for numbness and paresthesias.   Psychiatric/Behavioral:  Negative for altered mental status.         Objective:     Physical Exam  Vitals reviewed.   Constitutional:       Appearance: He is well-developed.   HENT:      Head: Normocephalic.   Cardiovascular:      Pulses:           Dorsalis pedis pulses are 1+ on the right side and 1+ on the left side.      Comments: PT pulses diminished b/l. CRT < 3 sec to tips of toes.    Pulmonary:      Effort: No respiratory distress.   Musculoskeletal:      Comments: Pain with palpation of toenails 1-5 b/l. Otherwise rectus foot and toe position b/l foot with no major deformities noted. Mild equinus noted b/l ankle with < 10 deg DF noted. MMT 5/5 in DF/PF/Inv/Ev resistance with no reproduction of pain in any direction b/l foot. Passive range of motion of ankle and pedal joints is painless b/l foot/ankle/toes.     Skin:     General: Skin is warm and dry.      Findings: No erythema.      Comments: No open lesions, lacerations or wounds noted. Nails are thickened, elongated, discolored yellow/brown with subungual debris and brittleness to R 1-5 and L 1-5. Interdigital spaces clean, dry and intact b/l. No erythema noted to b/l foot. Skin texture thin, atrophic, dry. Pedal hair diminished b/l. Toes b/l cool to touch.    Neurological:      Mental Status: He is alert and oriented to person, place, and time.      Sensory: No sensory deficit.      Comments: Light touch, proprioception, and sharp/dull sensation are all intact bilaterally.    Psychiatric:         Behavior: Behavior normal.         Thought Content: Thought content normal.         Judgment: Judgment normal.           Assessment:      Encounter Diagnoses   Name Primary?    Onychomycosis     PAD (peripheral artery disease) Yes     Plan:     Ej was seen today for nail care.    Diagnoses and all orders for this visit:    PAD (peripheral artery disease)    Onychomycosis  -     Ambulatory referral/consult to  Podiatry      I counseled the patient on his conditions, their implications and medical management.        - Shoe inspection. Patient instructed on proper foot hygeine. We discussed wearing proper shoe gear, daily foot inspections, never walking without protective shoe gear, never putting sharp instruments to feet, routine podiatric nail visits every 2-3 months.      - With patient's permission, nails were aggressively reduced and debrided x 10 to their soft tissue attachment mechanically and with electric , removing all offending nail and debris. Patient relates relief following the procedure. He will continue to monitor the areas daily, inspect his feet, wear protective shoe gear when ambulatory, moisturizer to maintain skin integrity and follow in this office in approximately 2-3 months, sooner p.r.n.    Discussed regular and routine moisturizer to skin of both feet to help improve dry skin. Advised to apply twice daily until resolution of symptoms. Avoid between toes.     RTC 3 months, sooner PRN

## 2023-10-26 ENCOUNTER — OFFICE VISIT (OUTPATIENT)
Dept: INTERNAL MEDICINE | Facility: CLINIC | Age: 83
End: 2023-10-26
Payer: MEDICARE

## 2023-10-26 ENCOUNTER — HOSPITAL ENCOUNTER (OUTPATIENT)
Dept: RADIOLOGY | Facility: HOSPITAL | Age: 83
Discharge: HOME | End: 2023-10-26
Attending: INTERNAL MEDICINE
Payer: MEDICARE

## 2023-10-26 VITALS
WEIGHT: 188.5 LBS | BODY MASS INDEX: 26.39 KG/M2 | HEIGHT: 71 IN | OXYGEN SATURATION: 98 % | SYSTOLIC BLOOD PRESSURE: 128 MMHG | DIASTOLIC BLOOD PRESSURE: 70 MMHG | HEART RATE: 72 BPM

## 2023-10-26 DIAGNOSIS — L03.116 CELLULITIS OF LEFT LOWER EXTREMITY: ICD-10-CM

## 2023-10-26 DIAGNOSIS — G45.9 TRANSIENT CEREBRAL ISCHEMIA, UNSPECIFIED TYPE: ICD-10-CM

## 2023-10-26 DIAGNOSIS — H40.1112 PRIMARY OPEN ANGLE GLAUCOMA OF RIGHT EYE, MODERATE STAGE: Primary | Chronic | ICD-10-CM

## 2023-10-26 DIAGNOSIS — R53.81 DEBILITY: ICD-10-CM

## 2023-10-26 DIAGNOSIS — Q67.0 FACIAL ASYMMETRY: ICD-10-CM

## 2023-10-26 DIAGNOSIS — M54.16 LUMBAR BACK PAIN WITH RADICULOPATHY AFFECTING LEFT LOWER EXTREMITY: ICD-10-CM

## 2023-10-26 PROCEDURE — 70450 CT HEAD/BRAIN W/O DYE: CPT | Mod: 26,HCNC,, | Performed by: RADIOLOGY

## 2023-10-26 PROCEDURE — 1126F PR PAIN SEVERITY QUANTIFIED, NO PAIN PRESENT: ICD-10-PCS | Mod: HCNC,CPTII,S$GLB, | Performed by: INTERNAL MEDICINE

## 2023-10-26 PROCEDURE — 70450 CT HEAD WITHOUT CONTRAST: ICD-10-PCS | Mod: 26,HCNC,, | Performed by: RADIOLOGY

## 2023-10-26 PROCEDURE — 99214 PR OFFICE/OUTPT VISIT, EST, LEVL IV, 30-39 MIN: ICD-10-PCS | Mod: HCNC,S$GLB,, | Performed by: INTERNAL MEDICINE

## 2023-10-26 PROCEDURE — 3078F DIAST BP <80 MM HG: CPT | Mod: HCNC,CPTII,S$GLB, | Performed by: INTERNAL MEDICINE

## 2023-10-26 PROCEDURE — 99999 PR PBB SHADOW E&M-EST. PATIENT-LVL IV: CPT | Mod: PBBFAC,HCNC,, | Performed by: INTERNAL MEDICINE

## 2023-10-26 PROCEDURE — 99999 PR PBB SHADOW E&M-EST. PATIENT-LVL IV: ICD-10-PCS | Mod: PBBFAC,HCNC,, | Performed by: INTERNAL MEDICINE

## 2023-10-26 PROCEDURE — 70450 CT HEAD/BRAIN W/O DYE: CPT | Mod: TC,HCNC

## 2023-10-26 PROCEDURE — 3074F SYST BP LT 130 MM HG: CPT | Mod: HCNC,CPTII,S$GLB, | Performed by: INTERNAL MEDICINE

## 2023-10-26 PROCEDURE — 99214 OFFICE O/P EST MOD 30 MIN: CPT | Mod: HCNC,S$GLB,, | Performed by: INTERNAL MEDICINE

## 2023-10-26 PROCEDURE — 3078F PR MOST RECENT DIASTOLIC BLOOD PRESSURE < 80 MM HG: ICD-10-PCS | Mod: HCNC,CPTII,S$GLB, | Performed by: INTERNAL MEDICINE

## 2023-10-26 PROCEDURE — 3074F PR MOST RECENT SYSTOLIC BLOOD PRESSURE < 130 MM HG: ICD-10-PCS | Mod: HCNC,CPTII,S$GLB, | Performed by: INTERNAL MEDICINE

## 2023-10-26 PROCEDURE — 1126F AMNT PAIN NOTED NONE PRSNT: CPT | Mod: HCNC,CPTII,S$GLB, | Performed by: INTERNAL MEDICINE

## 2023-10-26 NOTE — PROGRESS NOTES
"Subjective:       Patient ID: Ej Miller is a 83 y.o. male.    Chief Complaint: Follow-up    HPI      Admitted from 7/28 to 7/30 after presenting with bilateral LE swelling.  Per hospital discharge summary:  "Pt admitted to [facility] and initiated on vanc. BL LE US without DVT, and echo did not show evidence of CHF. Clinically improved into 7/30, remained stable. Pt deemed appropriate for discharge to complete PO course of doxy/keflex at home. Plan discussed with pt, who was agreeable and amenable; medications were discussed and reviewed, outpatient follow-up scheduled, ER precautions were given, all questions were answered to the pt's satisfaction, and Mr. Miller was subsequently discharged.    Feels like he has been drooling a lot lately.     Needs rollator to be able to get around has has difficulty ambulating on his own. He requires multiple rest with short ambulatory excursion greater than 50 feet and the Rolator with a seat provides it.       PMHx     Hx of gout no longer on allopurinol      Macrocytic anemia on last labs.      HTN: well controlled on amlodipine, metoprolol and losartan      Lives with son. Drinks 2-3 double vodkas nightly.         Review of Systems   Constitutional:  Negative for chills, diaphoresis, fatigue and fever.   HENT:  Negative for rhinorrhea, sneezing and sore throat.    Respiratory:  Negative for cough, chest tightness, shortness of breath and wheezing.    Cardiovascular:  Negative for chest pain, palpitations and leg swelling.   Gastrointestinal:  Negative for abdominal pain, blood in stool, diarrhea, nausea and vomiting.   Musculoskeletal:  Negative for arthralgias and back pain.   Neurological:  Negative for dizziness, weakness, light-headedness and headaches.   Psychiatric/Behavioral:  Negative for agitation and behavioral problems.            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 3/3/2017     Past Surgical History:   Procedure Laterality Date    APPENDECTOMY      TONSILLECTOMY, ADENOIDECTOMY      TOTAL KNEE ARTHROPLASTY Right 02/17/2017    TKR      Patient Active Problem List   Diagnosis    HTN (hypertension)    Gouty arthritis    Alcohol abuse    Neuropathy, alcoholic    History of colon polyps    Primary open angle glaucoma of right eye, moderate stage    Open angle with borderline findings of left eye    Eczema    Personal history of COVID-19    Decreased GFR    Acute left-sided low back pain    Cellulitis of left lower extremity    Bilateral swelling of feet and ankles    Leg swelling    Elevated brain natriuretic peptide (BNP) level    Onychomycosis        Objective:      Physical Exam  Constitutional:       Appearance: Normal appearance.   HENT:      Head: Normocephalic and atraumatic.   Cardiovascular:      Rate and Rhythm: Normal rate and regular rhythm.      Heart sounds: Normal heart sounds.   Pulmonary:      Effort: Pulmonary effort is normal.      Breath sounds: Normal breath sounds. No stridor. No wheezing or rales.   Abdominal:      General: Abdomen is flat.      Palpations: Abdomen is soft. There is no mass.      Tenderness: There is no abdominal tenderness.   Skin:     General: Skin is warm and dry.   Neurological:      Mental Status: He is alert and oriented to person, place, and time.   Psychiatric:         Mood and Affect: Mood normal.         Assessment:       Problem List Items Addressed This Visit          Ophtho    Primary open angle glaucoma of right eye, moderate stage - Primary (Chronic)    Relevant Orders    Ambulatory referral/consult to Ophthalmology       ID    Cellulitis of left lower extremity     Other Visit Diagnoses       Debility        Relevant Orders    Ambulatory  referral/consult to Home Health    Facial asymmetry        Transient cerebral ischemia, unspecified type        Relevant Orders    CT Head Without Contrast (Completed)    Lumbar back pain with radiculopathy affecting left lower extremity        Relevant Orders    WALKER FOR HOME USE            Plan:         Ej was seen today for follow-up.    Diagnoses and all orders for this visit:    Primary open angle glaucoma of right eye, moderate stage  -     Ambulatory referral/consult to Ophthalmology; Future    Cellulitis of left lower extremity  Resolved.     Debility:  Order home health today     Facial asymmetry  Transient cerebral ischemia, unspecified type  -     CT Head Without Contrast; Future  Mild facial asymmetry and has been drooling. Check CT brain.   Chronic issue.     Lumbar back pain with radiculopathy affecting left lower extremity  -     WALKER FOR HOME USE   He requires multiple rest with short ambulatory excursion greater than 50 feet and the Rolator with a seat provides it.              Brooklyn Tomlinson MD   Internal Medicine   Primary Care

## 2023-11-03 ENCOUNTER — TELEPHONE (OUTPATIENT)
Dept: INTERNAL MEDICINE | Facility: CLINIC | Age: 83
End: 2023-11-03
Payer: MEDICARE

## 2023-11-03 NOTE — TELEPHONE ENCOUNTER
----- Message from Nu Maddox sent at 11/3/2023  2:15 PM CDT -----  Contact: VANGIE/ [place]/[phone]  Type: Home Health (orders, updates, clarifications, etc.)    Home Health Agency/Nurse:[facility]    Phone number:[phone]      Reason for call:  Charlotte said that she is calling in regards needing the doctor to refax the order for the Rollater to [facility] along with the visit summary visit on 10/26 ,in order for the request to be approved     Comments: Fax : [phone] [facility]

## 2023-11-03 NOTE — TELEPHONE ENCOUNTER
----- Message from Nu Maddox sent at 11/3/2023  2:15 PM CDT -----  Contact: VANGIE/ [place]/[phone]  Type: Home Health (orders, updates, clarifications, etc.)    Home Health Agency/Nurse:[facility]    Phone number:[phone]      Reason for call:  Charlotte said that she is calling in regards needing the doctor to refax the order for the Rollater to [facility] along with the visit summary visit on 10/26 ,in order for the request to be approved     Comments: Fax : [phone] [facility]

## 2023-11-03 NOTE — TELEPHONE ENCOUNTER
----- Message from Nu Maddox sent at 11/3/2023  2:15 PM CDT -----  Contact: VANGIE/ [place]/[phone]  Type: Home Health (orders, updates, clarifications, etc.)    Home Health Agency/Nurse:[facility]    Phone number:[phone]      Reason for call:  Charlotte said that she is calling in regards needing the doctor to refax the order for the Rollater to [facility] along with the visit summary visit on 10/26 ,in order for the request to be approved     Comments: Fax : [phone] [facility]

## 2023-11-03 NOTE — TELEPHONE ENCOUNTER
----- Message from Nu Maddox sent at 11/3/2023  2:15 PM CDT -----  Contact: VANGIE/ [place]/[phone]  Type: Home Health (orders, updates, clarifications, etc.)    Home Health Agency/Nurse:[facility]    Phone number:[phone]      Reason for call:  [place]otte said that she is calling in regards needing the doctor to refax the order for the Rollater to [facility] along with the visit summary visit on 10/26 ,in order for the request to be approved     Comments: Fax : [phone] [facility]

## 2023-11-08 ENCOUNTER — TELEPHONE (OUTPATIENT)
Dept: INTERNAL MEDICINE | Facility: CLINIC | Age: 83
End: 2023-11-08
Payer: MEDICARE

## 2023-11-08 NOTE — TELEPHONE ENCOUNTER
----- Message from Sandy Watson sent at 11/8/2023  1:32 PM CST -----  Contact: [phone]  Type:Home Health(orders,updates,clarifications,etc)    Home Health Agency/Nurse: [facility]    Phone number: [phone]    Reason for call:    Comments: DME company is needing fax order for Rolator along with face sheet from office visit from last week. Fax # [phone]. They do not take order from epic, it mus be fax, third request. Thank you

## 2023-12-22 ENCOUNTER — EXTERNAL HOME HEALTH (OUTPATIENT)
Dept: HOME HEALTH SERVICES | Facility: HOSPITAL | Age: 83
End: 2023-12-22
Payer: MEDICARE

## 2024-01-03 ENCOUNTER — OFFICE VISIT (OUTPATIENT)
Dept: OPHTHALMOLOGY | Facility: CLINIC | Age: 84
End: 2024-01-03
Payer: MEDICARE

## 2024-01-03 ENCOUNTER — CLINICAL SUPPORT (OUTPATIENT)
Dept: OPHTHALMOLOGY | Facility: CLINIC | Age: 84
End: 2024-01-03
Payer: MEDICARE

## 2024-01-03 DIAGNOSIS — H35.3132 INTERMEDIATE STAGE NONEXUDATIVE AGE-RELATED MACULAR DEGENERATION OF BOTH EYES: ICD-10-CM

## 2024-01-03 DIAGNOSIS — H57.813 BROW PTOSIS, BILATERAL: ICD-10-CM

## 2024-01-03 DIAGNOSIS — H02.831 DERMATOCHALASIS OF BOTH UPPER EYELIDS: ICD-10-CM

## 2024-01-03 DIAGNOSIS — H25.812 COMBINED FORM OF AGE-RELATED CATARACT, LEFT EYE: ICD-10-CM

## 2024-01-03 DIAGNOSIS — H02.112 CICATRICIAL ECTROPION OF RIGHT LOWER EYELID: ICD-10-CM

## 2024-01-03 DIAGNOSIS — H40.1133 PRIMARY OPEN ANGLE GLAUCOMA (POAG) OF BOTH EYES, SEVERE STAGE: Primary | ICD-10-CM

## 2024-01-03 DIAGNOSIS — H54.10 BLINDNESS AND LOW VISION: ICD-10-CM

## 2024-01-03 DIAGNOSIS — H02.834 DERMATOCHALASIS OF BOTH UPPER EYELIDS: ICD-10-CM

## 2024-01-03 DIAGNOSIS — H40.1112 PRIMARY OPEN ANGLE GLAUCOMA OF RIGHT EYE, MODERATE STAGE: Chronic | ICD-10-CM

## 2024-01-03 DIAGNOSIS — Z96.1 PSEUDOPHAKIA, RIGHT EYE: ICD-10-CM

## 2024-01-03 PROCEDURE — 1101F PT FALLS ASSESS-DOCD LE1/YR: CPT | Mod: HCNC,CPTII,S$GLB, | Performed by: STUDENT IN AN ORGANIZED HEALTH CARE EDUCATION/TRAINING PROGRAM

## 2024-01-03 PROCEDURE — 76514 ECHO EXAM OF EYE THICKNESS: CPT | Mod: HCNC,S$GLB,, | Performed by: STUDENT IN AN ORGANIZED HEALTH CARE EDUCATION/TRAINING PROGRAM

## 2024-01-03 PROCEDURE — 92020 GONIOSCOPY: CPT | Mod: HCNC,S$GLB,, | Performed by: STUDENT IN AN ORGANIZED HEALTH CARE EDUCATION/TRAINING PROGRAM

## 2024-01-03 PROCEDURE — 1160F RVW MEDS BY RX/DR IN RCRD: CPT | Mod: HCNC,CPTII,S$GLB, | Performed by: STUDENT IN AN ORGANIZED HEALTH CARE EDUCATION/TRAINING PROGRAM

## 2024-01-03 PROCEDURE — 1159F MED LIST DOCD IN RCRD: CPT | Mod: HCNC,CPTII,S$GLB, | Performed by: STUDENT IN AN ORGANIZED HEALTH CARE EDUCATION/TRAINING PROGRAM

## 2024-01-03 PROCEDURE — 92083 EXTENDED VISUAL FIELD XM: CPT | Mod: HCNC,S$GLB,, | Performed by: STUDENT IN AN ORGANIZED HEALTH CARE EDUCATION/TRAINING PROGRAM

## 2024-01-03 PROCEDURE — 1126F AMNT PAIN NOTED NONE PRSNT: CPT | Mod: HCNC,CPTII,S$GLB, | Performed by: STUDENT IN AN ORGANIZED HEALTH CARE EDUCATION/TRAINING PROGRAM

## 2024-01-03 PROCEDURE — 3288F FALL RISK ASSESSMENT DOCD: CPT | Mod: HCNC,CPTII,S$GLB, | Performed by: STUDENT IN AN ORGANIZED HEALTH CARE EDUCATION/TRAINING PROGRAM

## 2024-01-03 PROCEDURE — 99204 OFFICE O/P NEW MOD 45 MIN: CPT | Mod: HCNC,S$GLB,, | Performed by: STUDENT IN AN ORGANIZED HEALTH CARE EDUCATION/TRAINING PROGRAM

## 2024-01-03 PROCEDURE — 99999 PR PBB SHADOW E&M-EST. PATIENT-LVL III: CPT | Mod: PBBFAC,HCNC,, | Performed by: STUDENT IN AN ORGANIZED HEALTH CARE EDUCATION/TRAINING PROGRAM

## 2024-01-03 NOTE — PROGRESS NOTES
"Subjective:       Patient ID: Ej Miller is a 83 y.o. male.    Chief Complaint: Glaucoma    HPI    83 y.o. male presents for Glaucoma Evaluation. Was referred by PCP   Brooklyn Tomlinson MD. Patient is seeing Dr. Liriano for eye care. Unknown   when patient was diagnosed, started IOP gtts 15+ years. Has "floppy" RLL,   started 1 year ago, causes infections and dryness. Hx of laser procedure   but unsure what for. No hx of other procedures. No hx of steroid use or   ocular trauma. No diabetes. No fhx of glaucoma. Complains of blurred VA   OD>OS x 1 year. Denies headaches and eye pain. Has FB sensation. Has   floaters, stable per patient. Forgets doses of drops frequently.    Gtts:  Dorzolamide timolol BID OU  Latanoprost qhs OU  Last edited by Tonja Duran MD on 1/3/2024  4:05 PM.               Assessment & Plan   Primary open angle glaucoma (POAG) of both eyes, severe stage  -     Toro Visual Field - OU - Extended - Both Eyes    Cicatricial ectropion of right lower eyelid    Dermatochalasis of both upper eyelids    Brow ptosis, bilateral    Intermediate stage nonexudative age-related macular degeneration of both eyes    Combined form of age-related cataract, left eye    Blindness and low vision    Pseudophakia, right eye         POAG severe OU  -Fhx (-), Steroids (-),Trauma(-)  -Drops: Latanoprost qhs OU // Dorzolamide timolol BID OU   -Drop intolerance/contraindication:-  -Laser: possible  -Surgeries: CE/IOL OD  -CCT: 493 // 508  -Gonio: CBB w sampolesi line OU  IOP max: ?  IOP goal: <13    1/3/2024 HVF 24-2 SAD and early IAD VFI 81% OD // SAD and INS VFI 72% OS  Discussed imaging and interpretation with patient.     IOP too high for the health of the eye. Recommend increase medication v SLT v surgical intervention. Pt chooses gtts.     Will continue present management with his previous drops and add Rhopressa qHS OU  Discussed eyedrops and recommend 5 minutes between all drops.     Ectropion " OD  Dermatochalasis and ptosis OU  Brow ptosis OU  Card for [facility] provided to patient  Recommend ectropion repair - I am concerned that poor eyelid anatomy may be preventing ocular medications from staying in the eye     ARMD - dry - intermediate OU  Pt is not on vitamins  Start AREDS2 PO BID OU  Amsler grid monitoring weekly     Combined forms of age-related cataract OS  Visually significant and interfering with activities of daily living including driving/reading  Recommend CE/IOL when he is ready     Pseudophakia OD  Lenses WC, monitor        PLAN  Latanoprost qhs OU // Dorzolamide timolol BID OU   add Rhopressa qHS OU --> 2 samples given     RTC PRN  Pt prefers to go back to Dr. Liriano - [place] -- will provide his note and printout of his visual field     If he decides to return to me -- will need OCT-RNFL and OCT-retina next visit      Tonja Duran M.D., M.S.  Department of Ophthalmology   Division of Glaucoma Surgery  [facility]

## 2024-01-08 ENCOUNTER — TELEPHONE (OUTPATIENT)
Dept: PODIATRY | Facility: CLINIC | Age: 84
End: 2024-01-08
Payer: MEDICARE

## 2024-01-24 ENCOUNTER — OFFICE VISIT (OUTPATIENT)
Dept: PODIATRY | Facility: CLINIC | Age: 84
End: 2024-01-24
Payer: MEDICARE

## 2024-01-24 VITALS
HEART RATE: 80 BPM | SYSTOLIC BLOOD PRESSURE: 147 MMHG | WEIGHT: 188.5 LBS | HEIGHT: 71 IN | DIASTOLIC BLOOD PRESSURE: 77 MMHG | BODY MASS INDEX: 26.39 KG/M2

## 2024-01-24 DIAGNOSIS — I73.9 PAD (PERIPHERAL ARTERY DISEASE): Primary | ICD-10-CM

## 2024-01-24 DIAGNOSIS — B35.1 ONYCHOMYCOSIS DUE TO DERMATOPHYTE: ICD-10-CM

## 2024-01-24 DIAGNOSIS — L84 CORNS/CALLOSITIES: ICD-10-CM

## 2024-01-24 PROCEDURE — 11057 PARNG/CUTG B9 HYPRKR LES >4: CPT | Mod: Q8,HCNC,S$GLB, | Performed by: PODIATRIST

## 2024-01-24 PROCEDURE — 11721 DEBRIDE NAIL 6 OR MORE: CPT | Mod: 59,Q8,HCNC,S$GLB | Performed by: PODIATRIST

## 2024-01-24 PROCEDURE — 99499 UNLISTED E&M SERVICE: CPT | Mod: HCNC,S$GLB,, | Performed by: PODIATRIST

## 2024-01-24 PROCEDURE — 99999 PR PBB SHADOW E&M-EST. PATIENT-LVL III: CPT | Mod: PBBFAC,HCNC,, | Performed by: PODIATRIST

## 2024-01-24 RX ORDER — BETAMETHASONE DIPROPIONATE 0.5 MG/ML
LOTION, AUGMENTED TOPICAL
COMMUNITY
Start: 2023-10-30

## 2024-01-24 RX ORDER — LOSARTAN POTASSIUM 50 MG/1
1 TABLET ORAL DAILY
COMMUNITY
Start: 2023-10-30

## 2024-01-24 RX ORDER — DORZOLAMIDE/TIMOLOL/PF 2 %-0.5 %
DROPS OPHTHALMIC (EYE)
COMMUNITY
Start: 2023-10-30

## 2024-01-24 RX ORDER — MULTIVIT-MIN/FOLIC ACID/HRB293 120 MCG-50
1 TABLET,CHEWABLE ORAL DAILY
COMMUNITY
Start: 2023-10-30

## 2024-01-24 RX ORDER — AMLODIPINE BESYLATE 5 MG/1
1 TABLET ORAL DAILY
COMMUNITY
Start: 2023-10-30

## 2024-01-24 NOTE — PROGRESS NOTES
Subjective:     Patient ID: Ej Miller is a 83 y.o. male.    Chief Complaint: Nail Care and Toe Pain (Right 4th toe and pinky toe)    Ej is a 83 y.o. male who presents to the clinic for evaluation and treatment of high risk feet. 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 The patient's chief complaint is long, thick toenails. Difficult for him to trim. R 4th/5th toenails bothering him because it has been a few weeks longer for trimming than usual. No new concerns. This patient has documented high risk feet requiring routine maintenance secondary to peripheral vascular disease.    PCP: Brooklyn Tomlinson MD    Date Last Seen by PCP: 10/26/2023     Current shoe gear:   Casual shoes          Last encounter in this department: Visit date not found    Hemoglobin A1C   Date Value Ref Range Status   01/11/2017 5.6 4.5 - 6.2 % Final     Comment:     According to ADA guidelines, hemoglobin A1C <7.0% represents  optimal control in non-pregnant diabetic patients.  Different  metrics may apply to specific populations.   Standards of Medical Care in Diabetes - 2016.  For the purpose of screening for the presence of diabetes:  <5.7%     Consistent with the absence of diabetes  5.7-6.4%  Consistent with increasing risk for diabetes   (prediabetes)  >or=6.5%  Consistent with diabetes  Currently no consensus exists for use of hemoglobin A1C  for diagnosis of diabetes for children.     09/21/2015 5.7 4.5 - 6.2 % Final   03/30/2013 6.0 4.0 - 6.2 % Final       Review of Systems   Constitutional: Negative for chills and fever.   Cardiovascular:  Negative for chest pain, claudication and leg swelling.   Respiratory:  Negative for cough and shortness of breath.    Skin:   Positive for dry skin and nail changes.   Musculoskeletal: Negative.    Gastrointestinal:  Negative for nausea and vomiting.   Neurological:  Negative for numbness and paresthesias.   Psychiatric/Behavioral:  Negative for altered mental status.         Objective:     Physical Exam  Vitals reviewed.   Constitutional:       Appearance: He is well-developed.   HENT:      Head: Normocephalic.   Cardiovascular:      Pulses:           Dorsalis pedis pulses are 1+ on the right side and 1+ on the left side.      Comments: PT pulses diminished b/l. CRT < 3 sec to tips of toes.    Pulmonary:      Effort: No respiratory distress.   Musculoskeletal:      Comments: Pain with palpation of toenails 1-5 b/l. Otherwise rectus foot and toe position b/l foot with no major deformities noted. Mild equinus noted b/l ankle with < 10 deg DF noted. MMT 5/5 in DF/PF/Inv/Ev resistance with no reproduction of pain in any direction b/l foot. Passive range of motion of ankle and pedal joints is painless b/l foot/ankle/toes.     Skin:     General: Skin is warm and dry.      Findings: No erythema.      Comments: No open lesions, lacerations or wounds noted. Nails are thickened, elongated, discolored yellow/brown with subungual debris and brittleness to R 1-5 and L 1-5. Interdigital spaces clean, dry and intact b/l. No erythema noted to b/l foot. Skin texture thin, atrophic, dry. Pedal hair diminished b/l. Toes b/l cool to touch. Hyperkeratotic lesion noted to distal tips of toes 2-4 b/l. Skin lines present to lesion/s. No signs of deep tissue injury.      Neurological:      Mental Status: He is alert and oriented to person, place, and time.      Sensory: No sensory deficit.      Comments: Light touch, proprioception, and sharp/dull sensation are all intact bilaterally.    Psychiatric:         Behavior: Behavior normal.         Thought Content: Thought content normal.         Judgment: Judgment normal.           Assessment:      Encounter Diagnoses    Name Primary?    PAD (peripheral artery disease) Yes    Onychomycosis due to dermatophyte     Corns/callosities      Plan:     Ej was seen today for nail care and toe pain.    Diagnoses and all orders for this visit:    PAD (peripheral artery disease)    Onychomycosis due to dermatophyte    Corns/callosities      I counseled the patient on his conditions, their implications and medical management.        - Shoe inspection. Patient instructed on proper foot hygeine. We discussed wearing proper shoe gear, daily foot inspections, never walking without protective shoe gear, never putting sharp instruments to feet, routine podiatric nail visits every 2-3 months.      - With patient's permission, nails were aggressively reduced and debrided x 10 to their soft tissue attachment mechanically and with electric , removing all offending nail and debris. Patient relates relief following the procedure. He will continue to monitor the areas daily, inspect his feet, wear protective shoe gear when ambulatory, moisturizer to maintain skin integrity and follow in this office in approximately 2-3 months, sooner p.r.n.    The affected area was cleansed with an alcohol prep pad. Next, utilizing a 5mm curette, the hyperkeratotic tissues were trimmed from distal tips of toes 2-4 b/l, down to appropriate level of skin. Care was taken to remove any nucleated core from the center of the lesion. No pinpoint bleeding was encountered. The patient tolerated relief following this procedure.     Discussed regular and routine moisturizer to skin of both feet to help improve dry skin. Advised to apply twice daily until resolution of symptoms. Avoid between toes.     RTC 3 months, sooner PRN

## 2024-02-01 ENCOUNTER — OFFICE VISIT (OUTPATIENT)
Dept: HOME HEALTH SERVICES | Facility: CLINIC | Age: 84
End: 2024-02-01
Payer: MEDICARE

## 2024-02-01 VITALS
HEIGHT: 71 IN | SYSTOLIC BLOOD PRESSURE: 145 MMHG | BODY MASS INDEX: 26.6 KG/M2 | WEIGHT: 190 LBS | DIASTOLIC BLOOD PRESSURE: 70 MMHG | HEART RATE: 61 BPM

## 2024-02-01 DIAGNOSIS — Z00.00 ENCOUNTER FOR PREVENTIVE HEALTH EXAMINATION: Primary | ICD-10-CM

## 2024-02-01 DIAGNOSIS — I10 PRIMARY HYPERTENSION: Chronic | ICD-10-CM

## 2024-02-01 DIAGNOSIS — H40.1133 PRIMARY OPEN ANGLE GLAUCOMA (POAG) OF BOTH EYES, SEVERE STAGE: ICD-10-CM

## 2024-02-01 DIAGNOSIS — G62.1 NEUROPATHY, ALCOHOLIC: Chronic | ICD-10-CM

## 2024-02-01 DIAGNOSIS — I77.1 TORTUOUS AORTA: ICD-10-CM

## 2024-02-01 PROCEDURE — 3288F FALL RISK ASSESSMENT DOCD: CPT | Mod: CPTII,S$GLB,, | Performed by: NURSE PRACTITIONER

## 2024-02-01 PROCEDURE — 1126F AMNT PAIN NOTED NONE PRSNT: CPT | Mod: CPTII,S$GLB,, | Performed by: NURSE PRACTITIONER

## 2024-02-01 PROCEDURE — 1160F RVW MEDS BY RX/DR IN RCRD: CPT | Mod: CPTII,S$GLB,, | Performed by: NURSE PRACTITIONER

## 2024-02-01 PROCEDURE — 3078F DIAST BP <80 MM HG: CPT | Mod: CPTII,S$GLB,, | Performed by: NURSE PRACTITIONER

## 2024-02-01 PROCEDURE — 3077F SYST BP >= 140 MM HG: CPT | Mod: CPTII,S$GLB,, | Performed by: NURSE PRACTITIONER

## 2024-02-01 PROCEDURE — 1101F PT FALLS ASSESS-DOCD LE1/YR: CPT | Mod: CPTII,S$GLB,, | Performed by: NURSE PRACTITIONER

## 2024-02-01 PROCEDURE — 1159F MED LIST DOCD IN RCRD: CPT | Mod: CPTII,S$GLB,, | Performed by: NURSE PRACTITIONER

## 2024-02-01 PROCEDURE — G0439 PPPS, SUBSEQ VISIT: HCPCS | Mod: S$GLB,,, | Performed by: NURSE PRACTITIONER

## 2024-02-01 NOTE — PROGRESS NOTES
"Ej Miller presented for an initial Medicare AWV today. The following components were reviewed and updated:    Medical history  Family History  Social history  Allergies and Current Medications  Health Risk Assessment  Health Maintenance  Care Team    **See Completed Assessments for Annual Wellness visit with in the encounter summary    The following assessments were completed:  Depression Screening  Cognitive function Screening  Timed Get Up Test  Whisper Test      Opioid documentation:      Patient does not have a current opioid prescription.          Vitals:    02/01/24 1058   BP: (!) 145/70   Pulse: 61   Weight: 86.2 kg (190 lb)   Height: 5' 11" (1.803 m)     Body mass index is 26.5 kg/m².       Physical Exam  Constitutional:       Appearance: Normal appearance.   HENT:      Head: Normocephalic and atraumatic.      Nose: Nose normal.      Mouth/Throat:      Mouth: Mucous membranes are moist.   Eyes:      Extraocular Movements: Extraocular movements intact.   Cardiovascular:      Rate and Rhythm: Normal rate and regular rhythm.      Heart sounds: Normal heart sounds.   Pulmonary:      Effort: Pulmonary effort is normal. No respiratory distress.      Breath sounds: Normal breath sounds.   Abdominal:      General: Bowel sounds are normal. There is no distension.      Palpations: Abdomen is soft.   Musculoskeletal:         General: Swelling (bilateral ankles) present. Normal range of motion.      Cervical back: Normal range of motion.   Skin:     General: Skin is warm and dry.   Neurological:      General: No focal deficit present.      Mental Status: He is alert and oriented to person, place, and time.      Gait: Gait abnormal (walker present).   Psychiatric:         Mood and Affect: Mood normal.         Behavior: Behavior normal.           Diagnoses and health risks identified today and associated recommendations/orders:  1. Encounter for preventive health examination  Assessments completed. Preventive " measures, health maintenance, and immunizations reviewed with patient and patients son.    2. Tortuous aorta  Stable, patient on Aspirin. Followed by PCP.    3. Neuropathy, alcoholic  Stable, followed by PCP.    4. Primary open angle glaucoma (POAG) of both eyes, severe stage  Stable, patient on Cosopt, Latanoprost, and Rhopressa. Followed by Optometry.    5. Primary hypertension  Stable, patient on Amlodipine and Losartan. Followed by PCP.    Provided Ej with a 5-10 year written screening schedule and personal prevention plan. Recommendations were developed using the USPSTF age appropriate recommendations. Education, counseling, and referrals were provided as needed.  After Visit Summary printed and given to patient which includes a list of additional screenings\tests needed.    Follow up in about 1 year (around 2/1/2025) for your next annual wellness visit.      Una Mosqueda NP      I offered to discuss advanced care planning, including how to pick a person who would make decisions for you if you were unable to make them for yourself, called a health care power of , and what kind of decisions you might make such as use of life sustaining treatments such as ventilators and tube feeding when faced with a life limiting illness recorded on a living will that they will need to know. (How you want to be cared for as you near the end of your natural life)     X Patient is interested in learning more about how to make advanced directives.  I provided them paperwork and offered to discuss this with them.

## 2024-02-04 PROBLEM — H40.1133 PRIMARY OPEN ANGLE GLAUCOMA (POAG) OF BOTH EYES, SEVERE STAGE: Status: ACTIVE | Noted: 2024-02-04

## 2024-02-04 PROBLEM — I77.1 TORTUOUS AORTA: Status: ACTIVE | Noted: 2024-02-04

## 2024-02-04 NOTE — PATIENT INSTRUCTIONS
Counseling and Referral of Other Preventative  (Italic type indicates deductible and co-insurance are waived)    Patient Name: Ej Miller  Today's Date: 2/4/2024    Health Maintenance       Date Due Completion Date    RSV Vaccine (Age 60+ and Pregnant patients) (1 - 1-dose 60+ series) Never done ---    Shingles Vaccine (2 of 3) 12/07/2015 10/12/2015    Influenza Vaccine (1) 09/01/2023 10/19/2020    COVID-19 Vaccine (3 - 2023-24 season) 09/01/2023 3/1/2021    TETANUS VACCINE 07/31/2027 7/31/2017 (Done)    Override on 7/31/2017: Done ([facility] [address])    Lipid Panel 06/20/2028 6/20/2023        No orders of the defined types were placed in this encounter.      The following information is provided to all patients.  This information is to help you find resources for any of the problems found today that may be affecting your health:                  Living healthy guide: www.[facility].[place].gov      Understanding Diabetes: www.diabetes.org      Eating healthy: www.cdc.gov/healthyweight      [facility] home safety checklist: www.cdc.gov/steadi/patient.html      Agency on Aging: www.goea.[place].gov      Alcoholics anonymous (AA): www.aa.org      Physical Activity: www.anthony.nih.gov/ul1eqdl      Tobacco use: www.quitwithusla.org

## 2024-04-12 ENCOUNTER — TELEPHONE (OUTPATIENT)
Dept: INTERNAL MEDICINE | Facility: CLINIC | Age: 84
End: 2024-04-12
Payer: MEDICARE

## 2024-04-12 NOTE — TELEPHONE ENCOUNTER
----- Message from Mariam Chang sent at 4/12/2024 10:08 AM CDT -----  Contact: Mr. Reginaldo Beck son Mobile# [phone]  Caller is requesting an earlier appointment then we can schedule.  Caller is requesting a message be sent to the provider.  If this is for urgent care symptoms, did you offer other providers at this location, providers at other locations, or [facility]? (yes, no, n/a):  N/A  If this is for the patients physical, did you offer to schedule next available and put on wait list, or to see NP or PA for their physical?  (yes, no, n/a):  N/A  When is the next available appointment with their provider:  05/06/2024  Reason for the appointment:  PREOP,   Patient preference of timeframe to be scheduled:  As soon as possible.  Would the patient like a call back, or a response through their [facility] portal?:   Call  Comments:

## 2024-04-16 ENCOUNTER — PATIENT MESSAGE (OUTPATIENT)
Dept: INTERNAL MEDICINE | Facility: CLINIC | Age: 84
End: 2024-04-16
Payer: MEDICARE

## 2024-04-17 ENCOUNTER — OFFICE VISIT (OUTPATIENT)
Dept: INTERNAL MEDICINE | Facility: CLINIC | Age: 84
End: 2024-04-17
Payer: MEDICARE

## 2024-04-17 ENCOUNTER — LAB VISIT (OUTPATIENT)
Dept: LAB | Facility: HOSPITAL | Age: 84
End: 2024-04-17
Payer: MEDICARE

## 2024-04-17 VITALS
BODY MASS INDEX: 27.26 KG/M2 | WEIGHT: 194.69 LBS | OXYGEN SATURATION: 99 % | DIASTOLIC BLOOD PRESSURE: 70 MMHG | HEART RATE: 62 BPM | SYSTOLIC BLOOD PRESSURE: 136 MMHG | HEIGHT: 71 IN

## 2024-04-17 DIAGNOSIS — R73.03 PREDIABETES: ICD-10-CM

## 2024-04-17 DIAGNOSIS — Z01.818 PRE-OP EVALUATION: Primary | ICD-10-CM

## 2024-04-17 DIAGNOSIS — Z01.818 PRE-OP EVALUATION: ICD-10-CM

## 2024-04-17 LAB
ALBUMIN SERPL BCP-MCNC: 3.9 G/DL (ref 3.5–5.2)
ALP SERPL-CCNC: 68 U/L (ref 55–135)
ALT SERPL W/O P-5'-P-CCNC: 10 U/L (ref 10–44)
ANION GAP SERPL CALC-SCNC: 9 MMOL/L (ref 8–16)
AST SERPL-CCNC: 23 U/L (ref 10–40)
BASOPHILS # BLD AUTO: 0.06 K/UL (ref 0–0.2)
BASOPHILS NFR BLD: 0.9 % (ref 0–1.9)
BILIRUB SERPL-MCNC: 1.1 MG/DL (ref 0.1–1)
BUN SERPL-MCNC: 14 MG/DL (ref 8–23)
CALCIUM SERPL-MCNC: 9.4 MG/DL (ref 8.7–10.5)
CHLORIDE SERPL-SCNC: 103 MMOL/L (ref 95–110)
CO2 SERPL-SCNC: 26 MMOL/L (ref 23–29)
CREAT SERPL-MCNC: 0.9 MG/DL (ref 0.5–1.4)
DIFFERENTIAL METHOD BLD: ABNORMAL
EOSINOPHIL # BLD AUTO: 0.4 K/UL (ref 0–0.5)
EOSINOPHIL NFR BLD: 6.1 % (ref 0–8)
ERYTHROCYTE [DISTWIDTH] IN BLOOD BY AUTOMATED COUNT: 12.2 % (ref 11.5–14.5)
EST. GFR  (NO RACE VARIABLE): >60 ML/MIN/1.73 M^2
ESTIMATED AVG GLUCOSE: 111 MG/DL (ref 68–131)
GLUCOSE SERPL-MCNC: 101 MG/DL (ref 70–110)
HBA1C MFR BLD: 5.5 % (ref 4–5.6)
HCT VFR BLD AUTO: 36.1 % (ref 40–54)
HGB BLD-MCNC: 12.3 G/DL (ref 14–18)
IMM GRANULOCYTES # BLD AUTO: 0.01 K/UL (ref 0–0.04)
IMM GRANULOCYTES NFR BLD AUTO: 0.2 % (ref 0–0.5)
INR PPP: 1 (ref 0.8–1.2)
LYMPHOCYTES # BLD AUTO: 1.6 K/UL (ref 1–4.8)
LYMPHOCYTES NFR BLD: 25.4 % (ref 18–48)
MCH RBC QN AUTO: 36 PG (ref 27–31)
MCHC RBC AUTO-ENTMCNC: 34.1 G/DL (ref 32–36)
MCV RBC AUTO: 106 FL (ref 82–98)
MONOCYTES # BLD AUTO: 0.8 K/UL (ref 0.3–1)
MONOCYTES NFR BLD: 11.9 % (ref 4–15)
NEUTROPHILS # BLD AUTO: 3.5 K/UL (ref 1.8–7.7)
NEUTROPHILS NFR BLD: 55.5 % (ref 38–73)
NRBC BLD-RTO: 0 /100 WBC
PLATELET # BLD AUTO: 252 K/UL (ref 150–450)
PMV BLD AUTO: 9.7 FL (ref 9.2–12.9)
POTASSIUM SERPL-SCNC: 4.2 MMOL/L (ref 3.5–5.1)
PROT SERPL-MCNC: 7.1 G/DL (ref 6–8.4)
PROTHROMBIN TIME: 11 SEC (ref 9–12.5)
RBC # BLD AUTO: 3.42 M/UL (ref 4.6–6.2)
SODIUM SERPL-SCNC: 138 MMOL/L (ref 136–145)
WBC # BLD AUTO: 6.38 K/UL (ref 3.9–12.7)

## 2024-04-17 PROCEDURE — 1101F PT FALLS ASSESS-DOCD LE1/YR: CPT | Mod: HCNC,CPTII,S$GLB, | Performed by: INTERNAL MEDICINE

## 2024-04-17 PROCEDURE — 99999 PR PBB SHADOW E&M-EST. PATIENT-LVL III: CPT | Mod: PBBFAC,HCNC,, | Performed by: INTERNAL MEDICINE

## 2024-04-17 PROCEDURE — 83036 HEMOGLOBIN GLYCOSYLATED A1C: CPT | Mod: HCNC | Performed by: INTERNAL MEDICINE

## 2024-04-17 PROCEDURE — 85610 PROTHROMBIN TIME: CPT | Mod: HCNC | Performed by: INTERNAL MEDICINE

## 2024-04-17 PROCEDURE — 1126F AMNT PAIN NOTED NONE PRSNT: CPT | Mod: HCNC,CPTII,S$GLB, | Performed by: INTERNAL MEDICINE

## 2024-04-17 PROCEDURE — 3075F SYST BP GE 130 - 139MM HG: CPT | Mod: HCNC,CPTII,S$GLB, | Performed by: INTERNAL MEDICINE

## 2024-04-17 PROCEDURE — 3078F DIAST BP <80 MM HG: CPT | Mod: HCNC,CPTII,S$GLB, | Performed by: INTERNAL MEDICINE

## 2024-04-17 PROCEDURE — 80053 COMPREHEN METABOLIC PANEL: CPT | Mod: HCNC | Performed by: INTERNAL MEDICINE

## 2024-04-17 PROCEDURE — 36415 COLL VENOUS BLD VENIPUNCTURE: CPT | Mod: HCNC | Performed by: INTERNAL MEDICINE

## 2024-04-17 PROCEDURE — 99214 OFFICE O/P EST MOD 30 MIN: CPT | Mod: HCNC,S$GLB,, | Performed by: INTERNAL MEDICINE

## 2024-04-17 PROCEDURE — 3288F FALL RISK ASSESSMENT DOCD: CPT | Mod: HCNC,CPTII,S$GLB, | Performed by: INTERNAL MEDICINE

## 2024-04-17 PROCEDURE — 85025 COMPLETE CBC W/AUTO DIFF WBC: CPT | Mod: HCNC | Performed by: INTERNAL MEDICINE

## 2024-04-17 NOTE — PROGRESS NOTES
Subjective:       Patient ID: Ej Miller is a 83 y.o. male.    Chief Complaint: Pre-op Exam    HPI    Presents for pre op evaluation for eye lid  surgery being done next month.     Otherwise feeling well.     Surgical Risk Assessment   Active cardiac issues:  Active decompensated heart failure? No   Unstable angina?  No   Significant uncontrolled arrhythmias? No   Severe valvular heart disease-Aortic or Mitral Stenosis? No   Recent MI or coronary revascularization < 30 days? No     Cardiac Risk Factors  History of CAD/ischemic heart disease? No   History of cerebrovascular disease? No   History of compensated heart failure? No   Type 2 diabetes requiring insulin? No   Serum Creatinine > 2? No   Total cardiac risk factors 0            Review of Systems   Constitutional:  Negative for chills, diaphoresis, fatigue and fever.   HENT:  Negative for rhinorrhea, sneezing and sore throat.    Respiratory:  Negative for cough, chest tightness, shortness of breath and wheezing.    Cardiovascular:  Negative for chest pain, palpitations and leg swelling.   Gastrointestinal:  Negative for abdominal pain, blood in stool, diarrhea, nausea and vomiting.   Musculoskeletal:  Negative for arthralgias and back pain.   Neurological:  Negative for dizziness, weakness, light-headedness and headaches.   Psychiatric/Behavioral:  Negative for agitation and behavioral problems.            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 3/3/2017     Past Surgical History:   Procedure Laterality Date    APPENDECTOMY      TONSILLECTOMY, ADENOIDECTOMY      TOTAL KNEE ARTHROPLASTY Right 02/17/2017    TKR      Patient Active Problem List   Diagnosis    HTN (hypertension)     Gouty arthritis    Alcohol abuse    Neuropathy, alcoholic    History of colon polyps    Open angle with borderline findings of left eye    Eczema    Personal history of COVID-19    Decreased GFR    Acute left-sided low back pain    Cellulitis of left lower extremity    Bilateral swelling of feet and ankles    Leg swelling    Elevated brain natriuretic peptide (BNP) level    Onychomycosis    Tortuous aorta    Primary open angle glaucoma (POAG) of both eyes, severe stage        Objective:      Physical Exam  Constitutional:       Appearance: Normal appearance.   HENT:      Head: Normocephalic and atraumatic.   Cardiovascular:      Rate and Rhythm: Normal rate and regular rhythm.      Heart sounds: Normal heart sounds.   Pulmonary:      Effort: Pulmonary effort is normal.      Breath sounds: Normal breath sounds. No stridor. No wheezing or rales.   Abdominal:      General: Abdomen is flat.      Palpations: Abdomen is soft. There is no mass.      Tenderness: There is no abdominal tenderness.   Skin:     General: Skin is warm and dry.   Neurological:      Mental Status: He is alert and oriented to person, place, and time.   Psychiatric:         Mood and Affect: Mood normal.         Assessment:       Problem List Items Addressed This Visit    None  Visit Diagnoses       Pre-op evaluation    -  Primary    Relevant Orders    Comprehensive Metabolic Panel (Completed)    CBC Auto Differential (Completed)    PROTIME-INR (Completed)    Prediabetes        Relevant Orders    HEMOGLOBIN A1C (Completed)            Plan:         Ej was seen today for pre-op exam.    Diagnoses and all orders for this visit:    Pre-op evaluation  Assessment/Plan:   Cardiovascular Risk Assessment:  Non-emergent surgery.  No active cardiac problems   Surgery is low risk  Functional Status: limited due to back pain and leg weakness.       RCRI Calculator Class and Risk percentage:       0.4%                   Recommendation:  1. Proceed to  Surgery.    Prediabetes  -     HEMOGLOBIN A1C; Future                 Brooklyn Tomlinson MD   Internal Medicine   Primary Care  .as

## 2024-04-19 ENCOUNTER — PATIENT MESSAGE (OUTPATIENT)
Dept: INTERNAL MEDICINE | Facility: CLINIC | Age: 84
End: 2024-04-19
Payer: MEDICARE

## 2024-04-26 ENCOUNTER — OFFICE VISIT (OUTPATIENT)
Dept: PODIATRY | Facility: CLINIC | Age: 84
End: 2024-04-26
Payer: MEDICARE

## 2024-04-26 VITALS
SYSTOLIC BLOOD PRESSURE: 120 MMHG | HEIGHT: 71 IN | HEART RATE: 73 BPM | DIASTOLIC BLOOD PRESSURE: 76 MMHG | RESPIRATION RATE: 18 BRPM | WEIGHT: 194 LBS | BODY MASS INDEX: 27.16 KG/M2

## 2024-04-26 DIAGNOSIS — B35.1 PAIN DUE TO ONYCHOMYCOSIS OF TOENAILS OF BOTH FEET: ICD-10-CM

## 2024-04-26 DIAGNOSIS — I73.9 PAD (PERIPHERAL ARTERY DISEASE): Primary | ICD-10-CM

## 2024-04-26 DIAGNOSIS — L84 CORNS/CALLOSITIES: ICD-10-CM

## 2024-04-26 DIAGNOSIS — M79.674 PAIN DUE TO ONYCHOMYCOSIS OF TOENAILS OF BOTH FEET: ICD-10-CM

## 2024-04-26 DIAGNOSIS — M79.675 PAIN DUE TO ONYCHOMYCOSIS OF TOENAILS OF BOTH FEET: ICD-10-CM

## 2024-04-26 DIAGNOSIS — L60.3 ONYCHODYSTROPHY: ICD-10-CM

## 2024-04-26 DIAGNOSIS — M79.674 TOE PAIN, RIGHT: ICD-10-CM

## 2024-04-26 PROCEDURE — 11057 PARNG/CUTG B9 HYPRKR LES >4: CPT | Mod: Q8,HCNC,S$GLB, | Performed by: PODIATRIST

## 2024-04-26 PROCEDURE — 1125F AMNT PAIN NOTED PAIN PRSNT: CPT | Mod: HCNC,CPTII,S$GLB, | Performed by: PODIATRIST

## 2024-04-26 PROCEDURE — 11721 DEBRIDE NAIL 6 OR MORE: CPT | Mod: Q8,59,HCNC,S$GLB | Performed by: PODIATRIST

## 2024-04-26 PROCEDURE — 99999 PR PBB SHADOW E&M-EST. PATIENT-LVL IV: CPT | Mod: PBBFAC,HCNC,, | Performed by: PODIATRIST

## 2024-04-26 PROCEDURE — 1160F RVW MEDS BY RX/DR IN RCRD: CPT | Mod: HCNC,CPTII,S$GLB, | Performed by: PODIATRIST

## 2024-04-26 PROCEDURE — 1159F MED LIST DOCD IN RCRD: CPT | Mod: HCNC,CPTII,S$GLB, | Performed by: PODIATRIST

## 2024-04-26 PROCEDURE — 99214 OFFICE O/P EST MOD 30 MIN: CPT | Mod: 25,HCNC,S$GLB, | Performed by: PODIATRIST

## 2024-04-26 RX ORDER — DICLOFENAC SODIUM 10 MG/G
2 GEL TOPICAL 3 TIMES DAILY
Qty: 100 G | Refills: 3 | Status: SHIPPED | OUTPATIENT
Start: 2024-04-26

## 2024-06-10 ENCOUNTER — PATIENT MESSAGE (OUTPATIENT)
Dept: INTERNAL MEDICINE | Facility: CLINIC | Age: 84
End: 2024-06-10
Payer: MEDICARE

## 2024-09-03 DIAGNOSIS — I10 ESSENTIAL HYPERTENSION: ICD-10-CM

## 2024-09-03 DIAGNOSIS — I10 HYPERTENSION: ICD-10-CM

## 2024-09-03 NOTE — TELEPHONE ENCOUNTER
No care due was identified.  [facility] Embedded Care Due Messages. Reference number: [id number].   9/03/2024 10:27:07 AM CDT

## 2024-09-04 RX ORDER — LOSARTAN POTASSIUM 50 MG/1
TABLET ORAL
Qty: 90 TABLET | Refills: 2 | Status: SHIPPED | OUTPATIENT
Start: 2024-09-04

## 2024-09-04 RX ORDER — AMLODIPINE BESYLATE 5 MG/1
TABLET ORAL
Qty: 90 TABLET | Refills: 2 | Status: SHIPPED | OUTPATIENT
Start: 2024-09-04

## 2024-09-04 NOTE — TELEPHONE ENCOUNTER
Refill Decision Note   Ej Miller  is requesting a refill authorization.  Brief Assessment and Rationale for Refill:  Approve     Medication Therapy Plan:  Duplicate Medication/Therapy: amLODIPine; Duplicate Med/therapy: Losartan. Both have historical report at same dose.. Defer to [facility]      Alert overridden per protocol: Yes   Comments:     Note composed:7:49 AM 09/04/2024

## 2025-02-24 DIAGNOSIS — Z00.00 ENCOUNTER FOR MEDICARE ANNUAL WELLNESS EXAM: ICD-10-CM

## 2025-06-06 DIAGNOSIS — I10 ESSENTIAL HYPERTENSION: ICD-10-CM

## 2025-06-06 DIAGNOSIS — I10 HYPERTENSION: ICD-10-CM

## 2025-06-06 NOTE — TELEPHONE ENCOUNTER
Care Due:                  Date            Visit Type   Department     Provider  --------------------------------------------------------------------------------                                EP -                              PRIMARY      [facility] INTERNAL  Last Visit: 04-      CARE ([facility])   MEDICINE       Brooklyn Tomlinson  Next Visit: None Scheduled  None         None Found                                                            Last  Test          Frequency    Reason                     Performed    Due Date  --------------------------------------------------------------------------------    Office Visit  15 months..  losartan.................  04- 07-    CMP.........  12 months..  losartan.................  04- 04-    [employer] Embedded Care Due Messages. Reference number: [id number].   6/06/2025 11:15:22 AM CDT

## 2025-06-07 NOTE — TELEPHONE ENCOUNTER
Refill Routing Note   Medication(s) are not appropriate for processing by [facility] for the following reason(s):        Required labs outdated  Required vitals outdated    [facility] action(s):  Defer   Requires appointment : Yes   Requires labs : Yes             Appointments  past 12m or future 3m with PCP    Date Provider   Last Visit   4/17/2024 Brooklyn Tomlinson MD   Next Visit   Visit date not found Brooklyn Tomlinson MD   ED visits in past 90 days: 0        Note composed:10:10 PM 06/06/2025

## 2025-06-10 RX ORDER — LOSARTAN POTASSIUM 50 MG/1
50 TABLET ORAL
Qty: 90 TABLET | Refills: 0 | Status: SHIPPED | OUTPATIENT
Start: 2025-06-10

## 2025-06-10 RX ORDER — AMLODIPINE BESYLATE 5 MG/1
5 TABLET ORAL
Qty: 90 TABLET | Refills: 0 | Status: SHIPPED | OUTPATIENT
Start: 2025-06-10

## 2025-07-05 NOTE — TELEPHONE ENCOUNTER
----- Message from Vinnie Hopkins sent at 6/13/2017  2:53 PM CDT -----  Contact: [place] [phone]  [place] ask for a call in regards to confirm a prescription for pain medication  
Spoke with someone of at [facility] and the young lady explained to me that they were calling to confirm a Rx for a majano and not pain meds. I provided her with a valid fax number to send over pw to be signed by the doctor.  
ambulatory

## 2025-09-05 PROBLEM — G93.5 BRAIN COMPRESSION: Status: ACTIVE | Noted: 2025-09-05

## 2025-09-05 PROBLEM — S09.90XA CLOSED HEAD INJURY, INITIAL ENCOUNTER: Status: ACTIVE | Noted: 2025-09-05

## 2025-09-05 PROBLEM — E87.1 HYPONATREMIA: Status: ACTIVE | Noted: 2025-09-05

## 2025-09-05 PROBLEM — I61.5 IVH (INTRAVENTRICULAR HEMORRHAGE): Status: ACTIVE | Noted: 2025-09-05

## 2025-09-05 PROBLEM — S06.5XAA SDH (SUBDURAL HEMATOMA): Status: ACTIVE | Noted: 2025-09-05

## 2025-09-05 PROBLEM — I60.9 SAH (SUBARACHNOID HEMORRHAGE): Status: ACTIVE | Noted: 2025-09-05

## 2025-09-06 PROBLEM — R74.8 ELEVATED CPK: Status: ACTIVE | Noted: 2025-09-06

## 2025-09-06 LAB
OHS QRS DURATION: 66 MS
OHS QRS DURATION: 74 MS
OHS QTC CALCULATION: 439 MS
OHS QTC CALCULATION: 459 MS

## (undated) DEVICE — SEE MEDLINE ITEM 154981

## (undated) DEVICE — MARKER SKIN STND TIP BLUE BARR

## (undated) DEVICE — KIT TOTAL KNEE TKOFG

## (undated) DEVICE — SEE MEDLINE ITEM 152487

## (undated) DEVICE — MASK FLYTE HOOD PEEL AWAY

## (undated) DEVICE — PADDING CAST 4IN

## (undated) DEVICE — BANDAGE ESMARK 6X12

## (undated) DEVICE — SOL IRR NACL .9% 3000ML

## (undated) DEVICE — SUT VICRYL PLUS 0 CT1 18IN

## (undated) DEVICE — ELECTRODE REM PLYHSV RETURN 9

## (undated) DEVICE — CLOSURE SKIN STERI STRIP 1/2X4

## (undated) DEVICE — SUT VICRYL BR 1 GEN 27 CT-1

## (undated) DEVICE — PAD COLD THERAPY KNEE WRAP ON

## (undated) DEVICE — Device

## (undated) DEVICE — SEE MEDLINE ITEM 157117

## (undated) DEVICE — HOOD T-5 TEAR AWAY STERILE

## (undated) DEVICE — NDL ECLIPSE SAFETY 18GX1-1/2IN

## (undated) DEVICE — ADHESIVE DERMABOND ADVANCED

## (undated) DEVICE — PUMP COLD THERAPY

## (undated) DEVICE — SEE MEDLINE ITEM 146298

## (undated) DEVICE — DERMABOND SKIN ADHESIVE PROPEN

## (undated) DEVICE — SEE MEDLINE ITEM 146270

## (undated) DEVICE — KIT IRR SUCTION HND PIECE

## (undated) DEVICE — BLADE SAGITTAL 18 X 1.27 X 90M

## (undated) DEVICE — BOWL CEMENT

## (undated) DEVICE — SUT MONOCRYL 3-0 PS-2 UND

## (undated) DEVICE — TOURNIQUET SB QC DP 34X4IN

## (undated) DEVICE — PAD KNEE POLAR XL

## (undated) DEVICE — SUT VICRYL PLUS 3-0 SH 18IN

## (undated) DEVICE — SUT ETHIBOND XTRA 1 CTX

## (undated) DEVICE — SYRINGE 30CC LL W/O NDL

## (undated) DEVICE — DRESSING AQUACEL EXTRA 10X12.5